# Patient Record
Sex: FEMALE | Race: WHITE | NOT HISPANIC OR LATINO | Employment: FULL TIME | URBAN - METROPOLITAN AREA
[De-identification: names, ages, dates, MRNs, and addresses within clinical notes are randomized per-mention and may not be internally consistent; named-entity substitution may affect disease eponyms.]

---

## 2017-01-09 ENCOUNTER — ALLSCRIPTS OFFICE VISIT (OUTPATIENT)
Dept: OTHER | Facility: OTHER | Age: 62
End: 2017-01-09

## 2017-02-13 ENCOUNTER — ALLSCRIPTS OFFICE VISIT (OUTPATIENT)
Dept: OTHER | Facility: OTHER | Age: 62
End: 2017-02-13

## 2017-02-13 DIAGNOSIS — E03.9 HYPOTHYROIDISM: ICD-10-CM

## 2017-02-13 DIAGNOSIS — E78.00 PURE HYPERCHOLESTEROLEMIA: ICD-10-CM

## 2017-02-13 DIAGNOSIS — E55.9 VITAMIN D DEFICIENCY: ICD-10-CM

## 2017-05-04 ENCOUNTER — TRANSCRIBE ORDERS (OUTPATIENT)
Dept: LAB | Facility: CLINIC | Age: 62
End: 2017-05-04

## 2017-05-04 ENCOUNTER — APPOINTMENT (OUTPATIENT)
Dept: LAB | Facility: CLINIC | Age: 62
End: 2017-05-04
Payer: COMMERCIAL

## 2017-05-04 DIAGNOSIS — E55.9 VITAMIN D DEFICIENCY: ICD-10-CM

## 2017-05-04 DIAGNOSIS — E78.00 PURE HYPERCHOLESTEROLEMIA: ICD-10-CM

## 2017-05-04 DIAGNOSIS — E03.9 HYPOTHYROIDISM: ICD-10-CM

## 2017-05-04 LAB
25(OH)D3 SERPL-MCNC: 26.4 NG/ML (ref 30–100)
ALBUMIN SERPL BCP-MCNC: 3.9 G/DL (ref 3.5–5)
ALP SERPL-CCNC: 58 U/L (ref 46–116)
ALT SERPL W P-5'-P-CCNC: 25 U/L (ref 12–78)
ANION GAP SERPL CALCULATED.3IONS-SCNC: 6 MMOL/L (ref 4–13)
AST SERPL W P-5'-P-CCNC: 13 U/L (ref 5–45)
BILIRUB SERPL-MCNC: 0.66 MG/DL (ref 0.2–1)
BUN SERPL-MCNC: 9 MG/DL (ref 5–25)
CALCIUM SERPL-MCNC: 8.8 MG/DL (ref 8.3–10.1)
CHLORIDE SERPL-SCNC: 101 MMOL/L (ref 100–108)
CHOLEST SERPL-MCNC: 74 MG/DL (ref 50–200)
CO2 SERPL-SCNC: 30 MMOL/L (ref 21–32)
CREAT SERPL-MCNC: 0.51 MG/DL (ref 0.6–1.3)
GFR SERPL CREATININE-BSD FRML MDRD: >60 ML/MIN/1.73SQ M
GLUCOSE P FAST SERPL-MCNC: 98 MG/DL (ref 65–99)
HDLC SERPL-MCNC: 58 MG/DL (ref 40–60)
LDLC SERPL CALC-MCNC: <0 MG/DL (ref 0–100)
POTASSIUM SERPL-SCNC: 4.2 MMOL/L (ref 3.5–5.3)
PROT SERPL-MCNC: 7 G/DL (ref 6.4–8.2)
SODIUM SERPL-SCNC: 137 MMOL/L (ref 136–145)
T4 FREE SERPL-MCNC: 0.92 NG/DL (ref 0.76–1.46)
TRIGL SERPL-MCNC: 211 MG/DL
TSH SERPL DL<=0.05 MIU/L-ACNC: 4.06 UIU/ML (ref 0.36–3.74)

## 2017-05-04 PROCEDURE — 82306 VITAMIN D 25 HYDROXY: CPT

## 2017-05-04 PROCEDURE — 84443 ASSAY THYROID STIM HORMONE: CPT

## 2017-05-04 PROCEDURE — 84439 ASSAY OF FREE THYROXINE: CPT

## 2017-05-04 PROCEDURE — 80061 LIPID PANEL: CPT

## 2017-05-04 PROCEDURE — 80053 COMPREHEN METABOLIC PANEL: CPT

## 2017-05-04 PROCEDURE — 36415 COLL VENOUS BLD VENIPUNCTURE: CPT

## 2017-05-10 ENCOUNTER — GENERIC CONVERSION - ENCOUNTER (OUTPATIENT)
Dept: OTHER | Facility: OTHER | Age: 62
End: 2017-05-10

## 2017-05-12 ENCOUNTER — ALLSCRIPTS OFFICE VISIT (OUTPATIENT)
Dept: OTHER | Facility: OTHER | Age: 62
End: 2017-05-12

## 2017-05-12 DIAGNOSIS — M54.12 RADICULOPATHY OF CERVICAL REGION: ICD-10-CM

## 2017-05-12 DIAGNOSIS — R09.89 OTHER SPECIFIED SYMPTOMS AND SIGNS INVOLVING THE CIRCULATORY AND RESPIRATORY SYSTEMS: ICD-10-CM

## 2017-05-12 DIAGNOSIS — R29.898 OTHER SYMPTOMS AND SIGNS INVOLVING THE MUSCULOSKELETAL SYSTEM: ICD-10-CM

## 2017-05-12 DIAGNOSIS — E03.9 HYPOTHYROIDISM: ICD-10-CM

## 2017-05-12 DIAGNOSIS — Z12.31 ENCOUNTER FOR SCREENING MAMMOGRAM FOR MALIGNANT NEOPLASM OF BREAST: ICD-10-CM

## 2017-05-12 DIAGNOSIS — R42 DIZZINESS AND GIDDINESS: ICD-10-CM

## 2017-08-16 ENCOUNTER — TRANSCRIBE ORDERS (OUTPATIENT)
Dept: LAB | Facility: CLINIC | Age: 62
End: 2017-08-16

## 2017-08-16 ENCOUNTER — APPOINTMENT (OUTPATIENT)
Dept: LAB | Facility: CLINIC | Age: 62
End: 2017-08-16
Payer: COMMERCIAL

## 2017-08-16 ENCOUNTER — GENERIC CONVERSION - ENCOUNTER (OUTPATIENT)
Dept: OTHER | Facility: OTHER | Age: 62
End: 2017-08-16

## 2017-08-16 DIAGNOSIS — E03.9 HYPOTHYROIDISM: ICD-10-CM

## 2017-08-16 DIAGNOSIS — E78.00 PURE HYPERCHOLESTEROLEMIA: ICD-10-CM

## 2017-08-16 DIAGNOSIS — M76.31 ILIOTIBIAL BAND SYNDROME OF RIGHT SIDE: ICD-10-CM

## 2017-08-16 LAB
ALBUMIN SERPL BCP-MCNC: 3.7 G/DL (ref 3.5–5)
ALP SERPL-CCNC: 60 U/L (ref 46–116)
ALT SERPL W P-5'-P-CCNC: 25 U/L (ref 12–78)
ANION GAP SERPL CALCULATED.3IONS-SCNC: 5 MMOL/L (ref 4–13)
AST SERPL W P-5'-P-CCNC: 17 U/L (ref 5–45)
BILIRUB SERPL-MCNC: 0.62 MG/DL (ref 0.2–1)
BUN SERPL-MCNC: 11 MG/DL (ref 5–25)
CALCIUM SERPL-MCNC: 8.9 MG/DL (ref 8.3–10.1)
CHLORIDE SERPL-SCNC: 104 MMOL/L (ref 100–108)
CHOLEST SERPL-MCNC: 239 MG/DL (ref 50–200)
CO2 SERPL-SCNC: 30 MMOL/L (ref 21–32)
CREAT SERPL-MCNC: 0.51 MG/DL (ref 0.6–1.3)
GFR SERPL CREATININE-BSD FRML MDRD: 106 ML/MIN/1.73SQ M
GLUCOSE P FAST SERPL-MCNC: 92 MG/DL (ref 65–99)
HDLC SERPL-MCNC: 57 MG/DL (ref 40–60)
LDLC SERPL CALC-MCNC: 139 MG/DL (ref 0–100)
POTASSIUM SERPL-SCNC: 4.1 MMOL/L (ref 3.5–5.3)
PROT SERPL-MCNC: 6.9 G/DL (ref 6.4–8.2)
SODIUM SERPL-SCNC: 139 MMOL/L (ref 136–145)
T4 FREE SERPL-MCNC: 0.96 NG/DL (ref 0.76–1.46)
TRIGL SERPL-MCNC: 214 MG/DL
TSH SERPL DL<=0.05 MIU/L-ACNC: 4.11 UIU/ML (ref 0.36–3.74)

## 2017-08-16 PROCEDURE — 36415 COLL VENOUS BLD VENIPUNCTURE: CPT

## 2017-08-16 PROCEDURE — 84443 ASSAY THYROID STIM HORMONE: CPT

## 2017-08-16 PROCEDURE — 84439 ASSAY OF FREE THYROXINE: CPT

## 2017-08-16 PROCEDURE — 80053 COMPREHEN METABOLIC PANEL: CPT

## 2017-08-16 PROCEDURE — 80061 LIPID PANEL: CPT

## 2017-08-18 ENCOUNTER — ALLSCRIPTS OFFICE VISIT (OUTPATIENT)
Dept: OTHER | Facility: OTHER | Age: 62
End: 2017-08-18

## 2017-08-24 ENCOUNTER — HOSPITAL ENCOUNTER (OUTPATIENT)
Dept: MAMMOGRAPHY | Facility: HOSPITAL | Age: 62
Discharge: HOME | End: 2017-08-24
Payer: COMMERCIAL

## 2017-08-24 ENCOUNTER — GENERIC CONVERSION - ENCOUNTER (OUTPATIENT)
Dept: OTHER | Facility: OTHER | Age: 62
End: 2017-08-24

## 2017-08-24 DIAGNOSIS — Z12.31 ENCOUNTER FOR SCREENING MAMMOGRAM FOR MALIGNANT NEOPLASM OF BREAST: ICD-10-CM

## 2017-08-24 PROCEDURE — G0202 SCR MAMMO BI INCL CAD: HCPCS

## 2017-11-02 ENCOUNTER — ALLSCRIPTS OFFICE VISIT (OUTPATIENT)
Dept: OTHER | Facility: OTHER | Age: 62
End: 2017-11-02

## 2017-12-18 DIAGNOSIS — E03.9 HYPOTHYROIDISM: ICD-10-CM

## 2018-01-10 NOTE — RESULT NOTES
Verified Results  * XR SPINE CERVICAL 2 OR 3 VIEW 51Ush0763 10:12AM Tressa Moreland    Order Number: [id number]     Test Name Result Flag Reference   XR SPINE CERVICAL 2 OR 3 VW (Report)     CERVICAL SPINE     INDICATION: Chronic neck pain     COMPARISON: None     VIEWS: AP, lateral and open mouth projections; 5 images     FINDINGS:     No evidence of fracture or subluxation  There is straightening of the normal cervical lordosis  Mild degenerative changes with small anterior osteophytes at C4-5  The prevertebral soft tissues are within normal limits  The lung apices are intact  IMPRESSION:     Mild degenerative changes without acute osseous abnormality  There is straightening of the normal cervical lordosis which can be seen in muscle spasm         Workstation performed: [id number]     Signed by:   Tamia Sandoval MD   7/3/16

## 2018-01-10 NOTE — RESULT NOTES
Verified Results  (1) COMPREHENSIVE METABOLIC PANEL [id number] 38:16OS [facility] Order Number: [id number]     Test Name Result Flag Reference   SODIUM 137 mmol/L  136-145   POTASSIUM 4 2 mmol/L  3 5-5 3   CHLORIDE 101 mmol/L  100-108   CARBON DIOXIDE 30 mmol/L  21-32   ANION GAP (CALC) 6 mmol/L  4-13   BLOOD UREA NITROGEN 9 mg/dL  5-25   CREATININE 0 51 mg/dL L 0 60-1 30   Standardized to IDMS reference method   CALCIUM 8 8 mg/dL  8 3-10 1   BILI, TOTAL 0 66 mg/dL  0 20-1 00   ALK PHOSPHATAS 58 U/L     ALT (SGPT) 25 U/L  12-78   AST(SGOT) 13 U/L  5-45   ALBUMIN 3 9 g/dL  3 5-5 0   TOTAL PROTEIN 7 0 g/dL  6 4-8 2   eGFR Non-African American      >60 0 ml/min/1 73sq [facility] Disease Education Program recommendations are as follows:  GFR calculation is accurate only with a steady state creatinine  Chronic Kidney disease less than 60 ml/min/1 73 sq  meters  Kidney failure less than 15 ml/min/1 73 sq  meters  GLUCOSE FASTING 98 mg/dL  65-99     (1) LIPID PANEL FASTING W DIRECT LDL REFLEX 67GSL7122 10:03AM [facility]cio [facility] Order Number: [id number]     Test Name Result Flag Reference   CHOLESTEROL 74 mg/dL     LDL CHOLESTEROL CALCULATED <0 mg/dL L 0-100   Unable to calculate   - Patient Instructions: This is a fasting blood test  Water, black tea or black coffee only after 9:00pm the night before test   Drink 2 glasses of water the morning of test       Triglyceride:         Normal              <150 mg/dl       Borderline High    150-199 mg/dl       High               200-499 mg/dl       Very High          >499 mg/dl  Cholesterol:         Desirable        <200 mg/dl      Borderline High  200-239 mg/dl      High             >239 mg/dl  HDL Cholesterol:        High    >59 mg/dL      Low     <41 mg/dL  LDL CALCULATED:    This screening LDL is a calculated result    It does not have the accuracy of the Direct Measured LDL in the monitoring of patients with hyperlipidemia and/or statin therapy  Direct Measure LDL (GZG863) must be ordered separately in these patients  TRIGLYCERIDES 211 mg/dL H <=150   Specimen collection should occur prior to N-Acetylcysteine or Metamizole administration due to the potential for falsely depressed results  HDL,DIRECT 58 mg/dL  40-60   Specimen collection should occur prior to Metamizole administration due to the potential for falsely depressed results  (1) TSH WITH FT4 REFLEX [id number] 10:03AM [employer] Order Number: [id number]     Test Name Result Flag Reference   TSH 4 060 uIU/mL H 0 358-3 740   Patients undergoing fluorescein dye angiography may retain small amounts of fluorescein in the body for 48-72 hours post procedure  Samples containing fluorescein can produce falsely depressed TSH values  If the patient had this procedure,a specimen should be resubmitted post fluorescein clearance  The recommended reference ranges for TSH during pregnancy are as follows:  First trimester 0 1 to 2 5 uIU/mL  Second trimester  0 2 to 3 0 uIU/mL  Third trimester 0 3 to 3 0 uIU/m   T4,FREE 0 92 ng/dL  0 76-1 46     (1) VITAMIN D 25-HYDROXY 48TLW9189 10:03AM [employer] Order Number: [id number]     Test Name Result Flag Reference   VIT D 25-HYDROX 26 4 ng/mL L 30 0-100 0   This assay is a certified procedure of the CDC Vitamin D Standardization Certification Program (VDSCP)     Deficiency <20ng/ml   Insufficiency 20-30ng/ml   Sufficient  ng/ml     *Patients undergoing fluorescein dye angiography may retain small amounts of fluorescein in the body for 48-72 hours post procedure  Samples containing fluorescein can produce falsely elevated Vitamin D values  If the patient had this procedure, a specimen should be resubmitted post fluorescein clearance

## 2018-01-11 NOTE — RESULT NOTES
Verified Results  * MAMMO SCREENING BILATERAL W CAD 37Hzc9748 08:46AM [address], [address] Order Number: [id number]    - Patient Instructions: To schedule this appointment, please contact Central Scheduling at [phone]  Do not wear any perfume, powder, lotion or deodorant on breast or underarm area  Please bring your doctors order, referral (if needed) and insurance information with you on the day of the test  Failure to bring this information may result in this test being rescheduled  Arrive 15 minutes prior to your appointment time to register  On the day of your test, please bring any prior mammogram or breast studies with you that were not performed at a [facility]  Failure to bring prior exams may result in your test needing to be rescheduled  Test Name Result Flag Reference   MAMMO SCREENING BILATERAL W CAD (Report)     Patient History:   Patient is postmenopausal    Family history of unknown cancer at age 72 in father, breast    cancer in maternal aunt  No Hormone Replacement Therapy   Patient has never smoked  Patient's BMI is 23 9  Reason for exam: screening, asymptomatic  Mammo Screening Bilateral W CAD: August 24, 2017 - Check In #:    [de-identified]   Bilateral CC and MLO view(s) were taken  Technologist: RASHAD Wright (RASHAD)(M)   Prior study comparison: December 30, 2015, bilateral AMA dig scrn   mamm w/CAD performed at [address] [place] [address]  August 19, 2013, bilateral digital screening mammogram,    performed at [address]  The breast tissue is heterogeneously dense, potentially limiting    the sensitivity of mammography  Patient risk, included in this    report, assists in determining the appropriate screening regimen    (such as 3-D mammography or the inclusion of automated breast    ultrasound or MRI)  3-D mammography may also remain indicated as    screening   No new dominant soft tissue mass, architectural distortion or suspicious calcifications are noted  The skin and    nipple contours are within normal limits  No evidence of malignancy  No significant changes   when compared with prior studies  ACR BI-RADSï¾® Assessments: BiRad:1 - Negative     Recommendation:   Routine screening mammogram of both breasts in 1 year  Analyzed by CAD     The patient is scheduled in a reminder system  8-10% of cancers will be missed on mammography  Management of a    palpable abnormality must be based on clinical grounds  Patients   will be notified of their results via letter from our facility  Accredited by [employer] of Radiology and FDA  Transcription Location: [address] [place] [address]: [id number]     Risk Value(s):   Tyrer-Cuzick 10 Year: 4 700%, Tyrer-Cuzick Lifetime: 11 900%,    Myriad Table: 1 5%, PATRICK 5 Year: 1 9%, NCI Lifetime: 10 2%   Signed by:   Lala Guo MD   8/24/17       Plan  Health Maintenance    · * MAMMO SCREENING BILATERAL W CAD ; every 1 year;  Last 24Aug2017;  Status:Active

## 2018-01-12 VITALS
HEIGHT: 62 IN | RESPIRATION RATE: 16 BRPM | SYSTOLIC BLOOD PRESSURE: 122 MMHG | BODY MASS INDEX: 26.87 KG/M2 | WEIGHT: 146 LBS | DIASTOLIC BLOOD PRESSURE: 80 MMHG | HEART RATE: 80 BPM

## 2018-01-14 VITALS
DIASTOLIC BLOOD PRESSURE: 70 MMHG | RESPIRATION RATE: 16 BRPM | WEIGHT: 144 LBS | HEART RATE: 60 BPM | BODY MASS INDEX: 26.5 KG/M2 | SYSTOLIC BLOOD PRESSURE: 110 MMHG | HEIGHT: 62 IN

## 2018-01-14 VITALS
TEMPERATURE: 97.6 F | BODY MASS INDEX: 26.75 KG/M2 | HEART RATE: 78 BPM | SYSTOLIC BLOOD PRESSURE: 117 MMHG | OXYGEN SATURATION: 97 % | WEIGHT: 146.25 LBS | DIASTOLIC BLOOD PRESSURE: 70 MMHG

## 2018-01-16 NOTE — PROGRESS NOTES
Assessment    1  Former smoker (V15 82) (X60 361)   2  Well adult on routine health check (V70 0) (Z00 00)   3  Hypercholesterolemia (272 0) (E78 00)   · High HDL   4  Hypothyroidism (244 9) (E03 9)   5  Chronic constipation (564 00) (K59 09)   6  Lightheadedness (780 4) (R42)   7  Left carotid bruit (785 9) (R09 89)   8  Cervical radicular pain (723 4) (M54 12)   9  Bilateral arm weakness (729 89) (R29 898)   10  Headache syndrome (339 89) (G44 89)   11  Dysphagia (787 20) (R13 10)    Plan  Bilateral arm weakness, Cervical radicular pain    · * MRI CERVICAL SPINE WO CONTRAST; Status:Need Information - Financial  Authorization; Requested for:02Nvk8816;    · *1 - [facility]  *  Status: Active  Requested for: 05JUE9404  Care Summary provided  : Yes  Dysphagia    · 1 - Franklyn Dillon MD, Dianelys Rosenbaum (Gastroenterology) Co-Management  *  Status: Active  Requested  for: 97FCO6052  Care Summary provided  : Yes  Encounter for mammogram to establish baseline mammogram    · * MAMMO SCREENING BILATERAL W CAD; Status:Active - Retrospective By Protocol  Authorization; Requested for:62Ppz4095;   Hypercholesterolemia    · (1) COMPREHENSIVE METABOLIC PANEL; Status:Active; Requested for:40Etj7892;    · (1) LIPID PANEL, FASTING; Status:Active; Requested for:15Phy7838;   Hypothyroidism    · Levothyroxine Sodium 75 MCG Oral Tablet; TAKE 1 TABLET DAILY   · (1) TSH WITH FT4 REFLEX; Status:Active; Requested for:49Qyx4056;   Left carotid bruit, Lightheadedness    · VAS CAROTID COMPLETE STUDY; SIDE:Bilateral; Status:Hold For - Scheduling;  Requested for:12May2017;     Discussion/Summary    Patient presents today for annual checkup  She got a few things going on  She's having pretty atypical symptoms several hours after exercise of "head rushes"   This seems like a headache syndrome and I would like her to try taking 1-2 Aleve prior to exercise and hydrate significantly 4, during and after exercise which I hope will resolve this problem  If not, I'm going to have her see neurology  She is having some persistent neck pain with a sensation of bilateral arm weakness  Her exam was relatively normal today, but I'm going to have her get a cervical spine MRI  She's having some intermittent dysphagia and I'm going to have her get an EGD and she was referred to GI for this  She also has been having some reflux which is new for her which should be evaluated by GI  In the interim, she may try taking Zantac 150 twice daily as needed  She has a history of hypothyroidism and TSH is a bit elevated  In light of her multiple symptoms including some fatigue, I'm going to bump up her levothyroxine to 75 Âµg  Repeat TSH within 3 months  Her lipids were very atypical with an LDL less than zero  This has not been the case in the past and I'm going to have her get repeat fasting blood work prior to follow-up in about 3 months  She does have some chronic degenerative changes of her neck  I am going to give her a slip for physical therapy and I sent her for an MRI of her C-spine  I am going to get a carotid duplex that she has a very slight left carotid bruit and she is also having symptoms that could be concerning for vertebral basilar insufficiency  She has some chronic constipation which is currently stable  She needs a mammogram and she will be given a prescription for that today  She is up-to-date on colonoscopy, but we may decide to do that a bit early with her upcoming EGD and chronic constipation  She has a history of hypothyroidism and TSH has been a bit elevated  Liver      Chief Complaint  Physical      History of Present Illness  HM, Adult Female: The patient is being seen for a health maintenance evaluation  General Health: The patient's health since the last visit is described as fair  She has regular dental visits  Immunizations status: up to date  Lifestyle:  She does not have a healthy diet  She has weight concerns   She exercises regularly  She does not use tobacco    Screening:   HPI: Patient presents today for an annual physical  She has multiple concerns  She continues to have a sensation of head "pressure" which occurs pretty specifically after she exercises in the morning and then sits down at work for several hours and stands  She will get a sensation of pressure diffusely throughout her skull  She denies severe headache, nausea or vomiting associated with this  She's had no vision changes  She did have an open-air MRI done which apparently was normal, but I do not have the results available today  She has no prior history of migraine headaches  There is no family history of migraines  She is having some chronic neck issues  She has some straightening of her lordosis and some degenerative changes on x-rays done a few months ago  She has been under chiropractic care with some relief but nothing that persists  She gets occasional pain into her posterior skull from her neck  She has limited range of motion of her neck chronically and feels as though her arms are becoming weaker  She also has been having some difficulty swallowing intermittently over the past year or so  She does not think food is ever gotten completely stuck, but has noted some food is slow to pass  She denies vomiting from this  She has had some intermittent reflux over the past year so as well, where she has not typically had problems with reflux in the past  She does complain of some fatigue  She has a history of hypothyroidism and TSH was mildly elevated  She has some chronic constipation  She notes she doesn't really have constipation and trouble moving her bowels  She didn't really goes at least every other day  She has allergies and continues on Flonase with good results  Review of Systems    Constitutional: no fever, not feeling poorly, no recent weight gain, no chills, not feeling tired and no recent weight loss  Eyes: no eyesight problems     ENT: no nasal discharge  Cardiovascular: no chest pain, no palpitations and no lower extremity edema  Respiratory: no shortness of breath, no cough, no wheezing and no shortness of breath during exertion  Gastrointestinal: constipation, but no abdominal pain, no nausea and no diarrhea  Genitourinary: no dysuria  Musculoskeletal: no myalgias and no joint stiffness  Integumentary: no rashes  Neurological: dizziness, but no headache, no numbness, no tingling and no fainting  Psychiatric: no anxiety, no sleep disturbances and no depression  Hematologic/Lymphatic: no tendency for easy bleeding and no tendency for easy bruising  Active Problems    1  Chronic back pain (724 5,338 29) (M54 9,G89 29)   2  Chronic constipation (564 00) (K59 09)   3  Encounter for screening colonoscopy (V76 51) (Z12 11)   4  Ganglion cyst (727 43) (M67 40)   5  Hypercholesterolemia (272 0) (E78 00)   6  Hypothyroidism (244 9) (E03 9)   7  Need for influenza vaccination (V04 81) (Z23)   8  Vitamin D deficiency (268 9) (E55 9)   9  Well adult on routine health check (V70 0) (Z00 00)    Past Medical History    · History of Acute UTI (599 0) (N39 0)   · History of Anxiety (300 00) (F41 9)   · History of Hip pain, right (719 45) (M25 551)   · History of chest pain (V13 89) (L69 726)   · History of fatigue (V13 89) (W73 932)   · History of hematuria (V13 09) (Z87 448)   · History of Muscle spasms of neck (728 85) (G67 216)   · History of Myalgia And Myositis (729 1)   · History of Neck pain (723 1) (M54 2)   · History of Pain, upper back (724 5) (M54 9)   · Polydipsia (783 5) (R63 1)   · History of Previous Spontaneous Vaginal Delivery   · History of Skin neoplasm (239 2) (D49 2)   · History of Sore throat (462) (J02 9)   · History of UTI (urinary tract infection), bacterial (599 0,041 9) (N39 0,A49  9)   · History of UTI symptoms (788 99) (R39 9)    Surgical History    · History of Shoulder Surgery   · History of Shoulder Surgery    Family History  Mother    · Family history of Pure Hypercholesterolemia  Father    · Family history of Cancer   · Family history of Large Cell Neuroendocrine Carcinoma   · Family history of Stroke Syndrome (V17 1)    Social History    · Being A Social Drinker   · Former smoker (G40 57) (E06 622)   · Marital History -  (V61 03)    Current Meds   1  Fluticasone Propionate 50 MCG/ACT Nasal Suspension; use 2 sprays in each nostril   once daily; Therapy: 60KDL7353 to (Evaluate:01Nru8430)  Requested for: 99FNT2277; Last   Rx:13Jan2017 Ordered   2  Levothyroxine Sodium 50 MCG Oral Tablet; take 1 tablet every day; Therapy: 98Sau2062 to (Evaluate:75Vzq2937)  Requested for: 50XZQ8066; Last   Rx:27Gse8812 Ordered   3  Vitamin D 1000 UNIT CAPS; TAKE 2 CAPSULE DAILY; Therapy: 80Plq4367 to Recorded    Allergies    1  Sulfa Drugs    Vitals   Recorded: 31JKW2555 10:08AM Recorded: 03LRU0202 09:30AM   Heart Rate   68   Respiration   16   Systolic 179 466, Sitting 625   Diastolic 72 72, Sitting 80   Height   5 ft 2 in   Weight   146 lb    BMI Calculated   26 7   BSA Calculated   1 67     Physical Exam    Constitutional   General appearance: No acute distress, well appearing and well nourished  Head and Face   Head and face: Normal     Palpation of the face and sinuses: No sinus tenderness  Eyes   Conjunctiva and lids: No swelling, erythema or discharge  Ophthalmoscopic examination: Normal fundi and optic discs  Ears, Nose, Mouth, and Throat   External inspection of ears and nose: Normal     Otoscopic examination: Tympanic membranes translucent with normal light reflex  Canals patent without erythema  Hearing: Normal     Nasal mucosa, septum, and turbinates: Normal without edema or erythema  Lips, teeth, and gums: Normal, good dentition  Oropharynx: Normal with no erythema, edema, exudate or lesions  Neck   Neck: Supple, symmetric, trachea midline, no masses      Thyroid: Normal, no thyromegaly  Pulmonary   Respiratory effort: No increased work of breathing or signs of respiratory distress  Auscultation of lungs: Clear to auscultation  Cardiovascular   Auscultation of heart: Normal rate and rhythm, normal S1 and S2, no murmurs  Carotid pulses: 2+ bilaterally  Abdominal aorta: Normal     Abdomen   Abdomen: Non-tender, no masses  Liver and spleen: No hepatomegaly or splenomegaly  Lymphatic   Palpation of lymph nodes in neck: No lymphadenopathy  Musculoskeletal   Gait and station: Normal     Skin   Skin and subcutaneous tissue: Normal without rashes or lesions  Neurologic   Cranial nerves: Cranial nerves II-XII intact  Cortical function: Normal mental status  Psychiatric   Judgment and insight: Normal     Orientation to person, place, and time: Normal     Recent and remote memory: Intact  Mood and affect: Normal        Future Appointments    Date/Time Provider Specialty Site   08/18/2017 09:15 SHERIDAN Sánchez   Family Medicine [facility] 876     Signatures   Electronically signed by : SHERIDAN Rosa ; May 12 2017  1:28PM EST                       (Author)

## 2018-01-18 NOTE — RESULT NOTES
Verified Results  (1) TSH WITH FT4 REFLEX [id number] 08:25AM Nathaly Anglin Order Number: [id number]     Test Name Result Flag Reference   TSH 4 110 uIU/mL H 0 358-3 740   Patients undergoing fluorescein dye angiography may retain small amounts of fluorescein in the body for 48-72 hours post procedure  Samples containing fluorescein can produce falsely depressed TSH values  If the patient had this procedure,a specimen should be resubmitted post fluorescein clearance  The recommended reference ranges for TSH during pregnancy are as follows:  First trimester 0 1 to 2 5 uIU/mL  Second trimester  0 2 to 3 0 uIU/mL  Third trimester 0 3 to 3 0 uIU/m   T4,FREE 0 96 ng/dL  0 76-1 46   Specimen collection should occur prior to Sulfasalazine administration due to the potential for falsely elevated results  (1) LIPID PANEL, FASTING 91Bhg9273 08:25AM [facility] Order Number: [id number]     Test Name Result Flag Reference   CHOLESTEROL 239 mg/dL H    HDL,DIRECT 57 mg/dL  40-60   Specimen collection should occur prior to Metamizole administration due to the potential for falsley depressed results  LDL CHOLESTEROL CALCULATED 139 mg/dL H 0-100   Triglyceride:        Normal ??? ??? ??? ??? ??? ??? ??? <150 mg/dl   ??? ??? ???Borderline High ??? ??? 150-199 mg/dl   ??? ??? ? ?? High ??? ??? ??? ??? ??? ??? ??? 200-499 mg/dl   ??? ??? ? ??Very High ??? ??? ??? ??? ??? >499 mg/dl      Cholesterol:       Desirable ??? ??? ??? ??? <200 mg/dl   ??? ??? Borderline High ??? 200-239 mg/dl   ??? ??? High ??? ??? ??? ??? ??? ??? >239 mg/dl      HDL Cholesterol:       High ??? ???>59 mg/dL   ??? ??? Low ??? ??? <41 mg/dL      This screening LDL is a calculated result  It does not have the accuracy of the Direct Measured LDL in the monitoring of patients with hyperlipidemia and/or statin therapy  Direct Measure LDL (GAR105) must be ordered separately in these patients  TRIGLYCERIDES 214 mg/dL H <=150   Specimen collection should occur prior to N-Acetylcysteine or Metamizole administration due to the potential for falsely depressed results  (1) COMPREHENSIVE METABOLIC PANEL 33DTE1320 12:87LL Nathaly Anglin Order Number: [id number]     Test Name Result Flag Reference   SODIUM 139 mmol/L  136-145   POTASSIUM 4 1 mmol/L  3 5-5 3   CHLORIDE 104 mmol/L  100-108   CARBON DIOXIDE 30 mmol/L  21-32   ANION GAP (CALC) 5 mmol/L  4-13   BLOOD UREA NITROGEN 11 mg/dL  5-25   CREATININE 0 51 mg/dL L 0 60-1 30   Standardized to IDMS reference method   CALCIUM 8 9 mg/dL  8 3-10 1   BILI, TOTAL 0 62 mg/dL  0 20-1 00   ALK PHOSPHATAS 60 U/L     ALT (SGPT) 25 U/L  12-78   Specimen collection should occur prior to Sulfasalazine and/or Sulfapyridine administration due to the potential for falsely depressed results  AST(SGOT) 17 U/L  5-45   Specimen collection should occur prior to Sulfasalazine administration due to the potential for falsely depressed results  ALBUMIN 3 7 g/dL  3 5-5 0   TOTAL PROTEIN 6 9 g/dL  6 4-8 2   eGFR 106 ml/min/1 73sq [facility] Disease Education Program recommendations are as follows:  GFR calculation is accurate only with a steady state creatinine  Chronic Kidney disease less than 60 ml/min/1 73 sq  meters  Kidney failure less than 15 ml/min/1 73 sq  meters  GLUCOSE FASTING 92 mg/dL  65-99   Specimen collection should occur prior to Sulfasalazine administration due to the potential for falsely depressed results  Specimen collection should occur prior to Sulfapyridine administration due to the potential for falsely elevated results

## 2018-01-22 VITALS
SYSTOLIC BLOOD PRESSURE: 120 MMHG | HEART RATE: 68 BPM | BODY MASS INDEX: 26.87 KG/M2 | DIASTOLIC BLOOD PRESSURE: 72 MMHG | HEIGHT: 62 IN | WEIGHT: 146 LBS | RESPIRATION RATE: 16 BRPM

## 2018-03-07 NOTE — PROGRESS NOTES
History of Present Illness    Revaccination   Vaccine Information: Vaccine(s) Given (names): Adacel 16  Spoke with patient regarding vaccine out of temperature range and risks and benefits of revaccination  Action(s): Revaccination declined  Pt called (attempt 1): [id number] [id number]  Other Information: discuss with DMD at o v  today- revaccination is declined  No Show Appt(s): [id number]  Revaccination Completed: 2017  Active Problems     1  Chronic back pain (724 5,338 29) (M54 9,G89 29)   2  Encounter for screening colonoscopy (V76 51) (Z12 11)   3  Need for influenza vaccination (V04 81) (Z23)    Well adult on routine health check (V70 0) (Z00 00)       Chronic constipation (564 00) (K59 00)          Immunizations  Influenza --- Ezequiel Escobar: [id number] Blakes: 22[birth year]Sep-2016   Tdap --- Series1: 2016     Current Meds   1  Cyclobenzaprine HCl - 10 MG Oral Tablet; TAKE 1 TABLET AT BEDTIME AS NEEDED   2  Levothyroxine Sodium 50 MCG Oral Tablet; take 1 tablet every day   3  Nitrofurantoin Macrocrystal 100 MG Oral Capsule; Take 1 po bid x 7 days   4  Vitamin D 1000 UNIT CAPS; TAKE 2 CAPSULE DAILY    Allergies    1  Sulfa Drugs    Plan    1  RVAC-Adacel 5-2-15 5 LF-MCG/0 5 Intramuscular Suspension    Future Appointments    Date/Time Provider Specialty Site   2017 09:15 SHERIDAN Wolfe   [facility] 876     Signatures   Electronically signed by : SHERIDAN Nelson ; May 12 2017  1:43PM EST

## 2018-03-13 ENCOUNTER — TELEPHONE (OUTPATIENT)
Dept: FAMILY MEDICINE CLINIC | Facility: CLINIC | Age: 63
End: 2018-03-13

## 2018-03-13 DIAGNOSIS — M76.31 IT BAND SYNDROME, RIGHT: Primary | ICD-10-CM

## 2018-03-13 NOTE — TELEPHONE ENCOUNTER
Pt called today stating that we had ordered PT for her several months ago and she had not gone but now she would like to go  Pt wants to go to [facility] but they will not accept the script that she has  Pt would like to know if we can give her a new script for this facility  Ok at order?

## 2018-05-06 DIAGNOSIS — E03.9 ACQUIRED HYPOTHYROIDISM: Primary | ICD-10-CM

## 2018-05-06 RX ORDER — LEVOTHYROXINE SODIUM 0.07 MG/1
TABLET ORAL
Qty: 30 TABLET | Refills: 5 | Status: SHIPPED | OUTPATIENT
Start: 2018-05-06 | End: 2018-11-06 | Stop reason: SDUPTHER

## 2018-05-29 ENCOUNTER — OFFICE VISIT (OUTPATIENT)
Dept: FAMILY MEDICINE CLINIC | Facility: CLINIC | Age: 63
End: 2018-05-29
Payer: COMMERCIAL

## 2018-05-29 VITALS
SYSTOLIC BLOOD PRESSURE: 128 MMHG | DIASTOLIC BLOOD PRESSURE: 76 MMHG | HEIGHT: 62 IN | RESPIRATION RATE: 16 BRPM | HEART RATE: 76 BPM | TEMPERATURE: 99.6 F | WEIGHT: 148 LBS | BODY MASS INDEX: 27.23 KG/M2

## 2018-05-29 DIAGNOSIS — E03.9 HYPOTHYROIDISM, UNSPECIFIED TYPE: Primary | ICD-10-CM

## 2018-05-29 DIAGNOSIS — G89.29 CHRONIC NECK PAIN: ICD-10-CM

## 2018-05-29 DIAGNOSIS — M79.671 RIGHT FOOT PAIN: ICD-10-CM

## 2018-05-29 DIAGNOSIS — M54.2 CHRONIC NECK PAIN: ICD-10-CM

## 2018-05-29 PROBLEM — R09.89 LEFT CAROTID BRUIT: Status: ACTIVE | Noted: 2017-05-12

## 2018-05-29 PROBLEM — G44.89 HEADACHE SYNDROME: Status: ACTIVE | Noted: 2017-05-12

## 2018-05-29 PROCEDURE — 99214 OFFICE O/P EST MOD 30 MIN: CPT | Performed by: FAMILY MEDICINE

## 2018-05-29 RX ORDER — BIOTIN 1 MG
2 TABLET ORAL DAILY
COMMUNITY
Start: 2015-09-11 | End: 2020-09-30

## 2018-05-29 RX ORDER — FLUTICASONE PROPIONATE 50 MCG
2 SPRAY, SUSPENSION (ML) NASAL DAILY
COMMUNITY
Start: 2016-12-16 | End: 2018-05-29

## 2018-05-29 NOTE — PROGRESS NOTES
Assessment/Plan:  Chronic neck pain  PT would agree to do neck pain if we make referral, patient already established, I agree she should have PT and would encourage her to be compliant with home and gym exercise programs     Diagnoses and all orders for this visit:    Hypothyroidism, unspecified type  Due for recheck of TSH w FT4, will order, for now continue with synthroid and will address dosage if indicated  Right foot pain  I suspect this might be a foreign body that is working its way out, most likely from her distant past   It could also be a cyst or a new infectious or inflammatory process that has not completely declared itself  For now I would like to get an x-ray of her foot for foreign body evaluation, I would also like for her to soak her foot about 3 times a day in warm water and she may also take NSAIDs like Aleve, Motrin, or Advil to help with the pain and discomfort  Icing might help with acute inflammation as well  I would make sure to wear supportive shoes and well-padded socks if needing to  Subjective:      Patient ID: Ted Calvillo is a 63 y o  female  62-year-old female who started this morning with a firm and tender nodule at the base of her heel in the fleshy part  It is painful with ambulation, she has no recollection of any trauma, she has some mild bruising  No systemic symptoms of fevers or chills, she does not recall any foreign bodies that could have been affecting this area  She works standing on her feet a lot in a [employer] room    Patient has a history of chronic neck pain, she has been following with physical therapy already and is requesting a referral  Patient has a history of hypothyroidism, at last visit had thyroid adjusted but has not had TSH Re checked, would be willing to go for labs        The following portions of the patient's history were reviewed and updated as appropriate: allergies, current medications, past family history, past medical history, past social history, past surgical history and problem list     Review of Systems   Constitutional: Positive for activity change  Negative for chills, fatigue, fever and unexpected weight change  HENT: Negative for hearing loss, postnasal drip and sore throat  Eyes: Negative for discharge and visual disturbance  Respiratory: Negative for shortness of breath  Cardiovascular: Negative for chest pain  Gastrointestinal: Negative for constipation, diarrhea, nausea and vomiting  Genitourinary: Negative for dysuria  No incontinence   Musculoskeletal: Positive for gait problem (Due to pain)  Negative for arthralgias and myalgias  Skin: Negative for rash  Small firm nodule in right heel  Mild bruising   Neurological: Negative for headaches  Hematological: Negative for adenopathy  Does not bruise/bleed easily  Psychiatric/Behavioral:        No anxiety or depression   All other systems reviewed and are negative  Objective:      /76   Pulse 76   Temp 99 6 °F (37 6 °C)   Resp 16   Ht 5' 2" (1 575 m)   Wt 67 1 kg (148 lb)   BMI 27 07 kg/m²          Physical Exam   Constitutional: She appears well-developed and well-nourished  No distress  HENT:   Head: Normocephalic and atraumatic  Mouth/Throat: Oropharynx is clear and moist    Eyes: Conjunctivae and EOM are normal  No scleral icterus  Neck: No thyromegaly present  Cardiovascular: Normal rate, regular rhythm and normal heart sounds  Exam reveals no gallop and no friction rub  No murmur heard  Pulmonary/Chest: Effort normal and breath sounds normal  No respiratory distress  Musculoskeletal:   Subcentimeter subcutaneous nodule with overlying bruising noted in the plantar aspect of the right heel   Skin: She is not diaphoretic

## 2018-05-29 NOTE — PATIENT INSTRUCTIONS
Assessment/Plan:  Chronic neck pain  PT would agree to do neck pain if we make referral, patient already established, I agree she should have PT and would encourage her to be compliant with home and gym exercise programs     Diagnoses and all orders for this visit:    Hypothyroidism, unspecified type  Due for recheck of TSH w FT4, will order, for now continue with synthroid and will address dosage if indicated  Right foot pain  I suspect this might be a foreign body that is working its way out, most likely from her distant past   It could also be a cyst or a new infectious or inflammatory process that has not completely declared itself  For now I would like to get an x-ray of her foot for foreign body evaluation, I would also like for her to soak her foot about 3 times a day in warm water and she may also take NSAIDs like Aleve, Motrin, or Advil to help with the pain and discomfort  Icing might help with acute inflammation as well  I would make sure to wear supportive shoes and well-padded socks if needing to

## 2018-05-29 NOTE — ASSESSMENT & PLAN NOTE
PT would agree to do neck pain if we make referral, patient already established, I agree she should have PT and would encourage her to be compliant with home and gym exercise programs

## 2018-06-01 ENCOUNTER — TRANSCRIBE ORDERS (OUTPATIENT)
Dept: ADMINISTRATIVE | Facility: HOSPITAL | Age: 63
End: 2018-06-01

## 2018-06-01 ENCOUNTER — HOSPITAL ENCOUNTER (OUTPATIENT)
Dept: RADIOLOGY | Facility: HOSPITAL | Age: 63
Discharge: HOME | End: 2018-06-01
Attending: FAMILY MEDICINE
Payer: COMMERCIAL

## 2018-06-01 DIAGNOSIS — M79.671 RIGHT FOOT PAIN: ICD-10-CM

## 2018-06-01 DIAGNOSIS — Z11.1 SCREENING EXAMINATION FOR PULMONARY TUBERCULOSIS: Primary | ICD-10-CM

## 2018-06-01 PROCEDURE — 73630 X-RAY EXAM OF FOOT: CPT

## 2018-06-08 ENCOUNTER — APPOINTMENT (OUTPATIENT)
Dept: LAB | Facility: CLINIC | Age: 63
End: 2018-06-08
Payer: COMMERCIAL

## 2018-06-08 DIAGNOSIS — E03.9 HYPOTHYROIDISM, UNSPECIFIED TYPE: ICD-10-CM

## 2018-06-08 LAB — TSH SERPL DL<=0.05 MIU/L-ACNC: 1.64 UIU/ML (ref 0.36–3.74)

## 2018-06-08 PROCEDURE — 84443 ASSAY THYROID STIM HORMONE: CPT

## 2018-06-08 PROCEDURE — 36415 COLL VENOUS BLD VENIPUNCTURE: CPT

## 2018-06-09 NOTE — PROGRESS NOTES
[facility]  FAM[facility] PRACT[facility] ACUTE OFFICE VISIT       NAME: Oscar Warner  AGE: 63 y o  SEX: female       : 1955        MRN: [id number]    DATE: 2018  TIME: 1:01 AM    Assessment and Plan     Problem List Items Addressed This Visit     Acute pain of both knees - Primary     The patient was encouraged to check knee x-rays as above and then also try PT for the knees  She can wear a supportive brace but was encouraged to not wear continuously  She was directed to start 2 Aleve twice daily with food if needed for relief  She should call if not improving with Pt over the next few weeks and we can refer her to Orthopedics  Relevant Orders    XR knee 3 vw left non injury    XR knee 3 vw right non injury    Ambulatory referral to Physical Therapy          Acute pain of both knees  The patient was encouraged to check knee x-rays as above and then also try PT for the knees  She can wear a supportive brace but was encouraged to not wear continuously  She was directed to start 2 Aleve twice daily with food if needed for relief  She should call if not improving with Pt over the next few weeks and we can refer her to Orthopedics  Chief Complaint     Chief Complaint   Patient presents with    Knee Pain     for 2 weeks        History of Present Illness   Oscar Warner is a 63y o -year-old female who presents for knee pain  Of note, she was seen by Dr Claudette Kand in our office about a week and half ago for right foot pain that was accompanied by a new nodule noted in the fleshy part of her heel  She was sent for an x-ray which did not find any foreign body  She reports that this pain has resolved but the nodule is still there  She notes that she started with left knee pain that was sharp and then now the right knee is helping  She notes that she was achy aa few years ago and has neck pain after started swimming   She stopped swimming so then has been stopping exercises in general  She notes that she feels like she has been tightening  She has been trying PT to stretch out over the last month  She was told that her knee cap was tight and was getting manipulation at the PT  She has pain up and down the steps and has crunchy sound too  She notes PT thinks that her knee cap is loose  She recommended avoiding steps and try ice  She has been improving but still getting sharp pains  She also notes that she has a bulging vein in the back of the left knee that is painless  She has been taking ibuprofen occasionally  She denies any trauma to the knees  Knee Pain    Pertinent negatives include no numbness  Review of Systems   Review of Systems   Musculoskeletal: Positive for arthralgias  Negative for joint swelling  Skin: Negative for rash  Neurological: Negative for numbness  Active Problem List     Patient Active Problem List   Diagnosis    Chronic constipation    Headache syndrome    Hypercholesterolemia    Hypothyroidism    Left carotid bruit    Vitamin D deficiency    Chronic neck pain    Acute pain of both knees    Dizziness    Headache    Hip pain    Trochanteric bursitis         Social History:  Social History     Social History    Marital status:      Spouse name: N/A    Number of children: N/A    Years of education: N/A     Occupational History    Not on file       Social History Main Topics    Smoking status: Never Smoker    Smokeless tobacco: Never Used      Comment: Former smoker per [employer]     Alcohol use Yes      Comment: occasionally / social     Drug use: No    Sexual activity: Not on file     Other Topics Concern    Not on file     Social History Narrative    No narrative on file       Objective     Vitals:    06/11/18 0956   BP: 120/70   Pulse: 62   Temp: 98 8 °F (37 1 °C)   SpO2: 97%     Wt Readings from Last 3 Encounters:   06/11/18 67 8 kg (149 lb 8 oz)   05/29/18 67 1 kg (148 lb)   08/18/17 65 3 kg (144 lb)       Physical Exam   Constitutional: She appears well-developed and well-nourished  No distress  Pulmonary/Chest: Effort normal    Musculoskeletal: Normal range of motion  She exhibits no edema or tenderness (over either knee)  No erythema/swelling/effusion noted of either knee, negative Blair's bilaterally (but mild crepitus noted during testing of left knee), negative anterior and posterior drawer bilaterally, negative medial and lateral distraction bilaterally, negative Apley compression and distraction bilaterally     Skin: Skin is warm and dry  No rash noted  Psychiatric: She has a normal mood and affect  Her behavior is normal    Vitals reviewed  Pertinent Laboratory/Diagnostic Studies:  Results for orders placed or performed in visit on 06/08/18   TSH, 3rd generation with T4 reflex   Result Value Ref Range    TSH 3RD GENERATON 1 640 0 358 - 3 740 uIU/mL       Orders Placed This Encounter   Procedures    XR knee 3 vw left non injury    XR knee 3 vw right non injury    Ambulatory referral to Physical Therapy       ALLERGIES:  Allergies   Allergen Reactions    Albuterol      Other reaction(s): SULFA (SULFONAMIDES) (Rash)    Sulfa Antibiotics Rash       Current Medications     Current Outpatient Prescriptions   Medication Sig Dispense Refill    Cholecalciferol (VITAMIN D3) 1000 units CAPS Take 2 capsules by mouth daily      levothyroxine 75 mcg tablet TAKE 1 TABLET DAILY  30 tablet 5     No current facility-administered medications for this visit            Carolyn Brower PA-C  6/12/2018 1:01 AM  [facility]

## 2018-06-11 ENCOUNTER — HOSPITAL ENCOUNTER (OUTPATIENT)
Dept: RADIOLOGY | Facility: HOSPITAL | Age: 63
Discharge: HOME | End: 2018-06-11
Payer: COMMERCIAL

## 2018-06-11 ENCOUNTER — OFFICE VISIT (OUTPATIENT)
Dept: FAMILY MEDICINE CLINIC | Facility: CLINIC | Age: 63
End: 2018-06-11
Payer: COMMERCIAL

## 2018-06-11 VITALS
SYSTOLIC BLOOD PRESSURE: 120 MMHG | BODY MASS INDEX: 27.51 KG/M2 | TEMPERATURE: 98.8 F | WEIGHT: 149.5 LBS | DIASTOLIC BLOOD PRESSURE: 70 MMHG | HEIGHT: 62 IN | OXYGEN SATURATION: 97 % | HEART RATE: 62 BPM

## 2018-06-11 DIAGNOSIS — M25.562 ACUTE PAIN OF BOTH KNEES: Primary | ICD-10-CM

## 2018-06-11 DIAGNOSIS — M25.562 ACUTE PAIN OF BOTH KNEES: ICD-10-CM

## 2018-06-11 DIAGNOSIS — M25.561 ACUTE PAIN OF BOTH KNEES: ICD-10-CM

## 2018-06-11 DIAGNOSIS — M25.561 ACUTE PAIN OF BOTH KNEES: Primary | ICD-10-CM

## 2018-06-11 PROCEDURE — 73562 X-RAY EXAM OF KNEE 3: CPT

## 2018-06-11 PROCEDURE — 99213 OFFICE O/P EST LOW 20 MIN: CPT | Performed by: PHYSICIAN ASSISTANT

## 2018-06-11 PROCEDURE — 3008F BODY MASS INDEX DOCD: CPT | Performed by: PHYSICIAN ASSISTANT

## 2018-06-11 NOTE — PATIENT INSTRUCTIONS
Quadriceps Exercises   WHAT YOU NEED TO KNOW:   What do I need to know about quadriceps exercises? Quadriceps exercises help reduce knee pain, keep muscles strong and flexible, and improve function after injury  · Start slow  These are beginning exercises  Ask your healthcare provider if you need to see a physical therapist for more advanced exercises  As you get stronger, you may be able to do more sets of each exercise or add weights  · Stop if you feel pain  It is normal to feel some discomfort at first  Regular exercise will help decrease your discomfort over time  · Warm up before you do quadriceps exercises  Ride a stationary bike or walk for 5 or 10 minutes to warm your muscles  · Follow the exercise program recommended by your healthcare provider  He will tell you which exercises are best for your condition  He will also tell you how many repetitions to do and how often you should do the exercises  How do I perform quadriceps stretches safely? · Kneeling hip flexor stretch:  Kneel on your left knee  Place your right foot in front of you  Keep your right knee bent and your foot flat on the floor  Your knee should be directly above your ankle  Put both of your hands on top of your right thigh  Keep your back straight and abdominal muscles tight  Put weight on your right leg and lean forward until you feel a stretch in your left thigh  Hold the stretch for about 30 seconds  Repeat 2 or 3 times on each leg or as directed  · Standing quadriceps stretch:  Stand and place one hand against a wall or hold the back of a chair for balance  With your weight on one leg, bend your other leg and grab your ankle  Bring your heel toward your buttocks  Hold the stretch for 30 to 60 seconds  Switch legs  Repeat 2 or 3 times on each leg  How do I perform quadriceps strengthening exercises safely? · Quad set exercise:  Lie on a flat, firm surface   Bend your left leg until your foot is flat on the floor  Keep your right leg straight  Tighten the top of your right thigh and hold for 10 to 20 seconds, and then relax  Repeat this exercise 5 to 10 times and then repeat on your other leg  · Straight leg lift:  Lie on a flat, firm surface  Bend your left leg until your foot is flat on the floor  Raise your right leg several inches off the floor and hold for 5 to 10 seconds  Lower your leg slowly  Repeat this exercise 5 to 10 times and then repeat on your other leg  · Sitting leg lifts:  Sit in a chair  Slowly straighten and raise one leg  Squeeze your thigh muscles and hold for 5 seconds  Relax and return your foot to the floor  Do 2 sets of 10 lifts on each leg  · Standing half squats:  Stand with your feet shoulder-width apart  Lean your back against a wall or hold the back of a chair for balance, if needed  Slowly sit down about 10 inches, as if you are going to sit in a chair  Your body weight should be mostly over your heels  Hold the squat for 5 seconds, then rise to a standing position  Repeat 10 times for 1 set  Rest for 1 to 2 minutes  Do another set of 10  · Step ups:  Use a 6-inch stool, step, or other platform to do this exercise  Place one foot on top of the platform  Lift your other foot off the floor and let it hang loosely  Stay in that position for 3 to 5 seconds  Then, slowly lower your foot to the floor  Lower your other foot off the platform surface and onto the floor  Repeat this exercise 5 to 10 times and then repeat on your other leg  When should I contact my healthcare provider? · Your pain becomes worse or you have new pain  · You have questions or concerns about your condition, care, or exercise program   CARE AGREEMENT:   You have the right to help plan your care  Learn about your health condition and how it may be treated  Discuss treatment options with your caregivers to decide what care you want to receive   You always have the right to refuse treatment  The above information is an  only  It is not intended as medical advice for individual conditions or treatments  Talk to your doctor, nurse or pharmacist before following any medical regimen to see if it is safe and effective for you  © 2017 [employer]  Information is for End User's use only and may not be sold, redistributed or otherwise used for commercial purposes  All illustrations and images included in CareNotes® are the copyrighted property of A [employer]  or [employer]  Acute pain of both knees  The patient was encouraged to check knee x-rays as above and then also try PT for the knees  She can wear a supportive brace but was encouraged to not wear continuously  She was directed to start 2 Aleve twice daily with food if needed for relief  She should call if not improving with Pt over the next few weeks and we can refer her to Orthopedics

## 2018-06-11 NOTE — ASSESSMENT & PLAN NOTE
The patient was encouraged to check knee x-rays as above and then also try PT for the knees  She can wear a supportive brace but was encouraged to not wear continuously  She was directed to start 2 Aleve twice daily with food if needed for relief  She should call if not improving with Pt over the next few weeks and we can refer her to Orthopedics

## 2018-06-12 PROBLEM — R42 DIZZINESS: Status: ACTIVE | Noted: 2018-06-12

## 2018-06-12 PROBLEM — M70.60 TROCHANTERIC BURSITIS: Status: ACTIVE | Noted: 2018-06-12

## 2018-06-12 PROBLEM — M25.559 HIP PAIN: Status: ACTIVE | Noted: 2018-06-12

## 2018-06-12 PROBLEM — R51.9 HEADACHE: Status: ACTIVE | Noted: 2018-06-12

## 2018-10-24 ENCOUNTER — IMMUNIZATION (OUTPATIENT)
Dept: FAMILY MEDICINE CLINIC | Facility: CLINIC | Age: 63
End: 2018-10-24
Payer: COMMERCIAL

## 2018-10-24 DIAGNOSIS — Z23 FLU VACCINE NEED: Primary | ICD-10-CM

## 2018-10-24 DIAGNOSIS — Z12.31 ENCOUNTER FOR SCREENING MAMMOGRAM FOR BREAST CANCER: ICD-10-CM

## 2018-10-24 PROCEDURE — 90471 IMMUNIZATION ADMIN: CPT

## 2018-10-24 PROCEDURE — 90682 RIV4 VACC RECOMBINANT DNA IM: CPT

## 2018-10-24 NOTE — PROGRESS NOTES
Pt came in for a Flu shot  I seen she is over-due for Mammogram and PE   Yesenia ordered and scheduled pt to be seen on 12/3/2018

## 2018-11-06 DIAGNOSIS — E03.9 ACQUIRED HYPOTHYROIDISM: ICD-10-CM

## 2018-11-06 RX ORDER — LEVOTHYROXINE SODIUM 0.07 MG/1
TABLET ORAL
Qty: 30 TABLET | Refills: 5 | Status: SHIPPED | OUTPATIENT
Start: 2018-11-06 | End: 2018-12-03 | Stop reason: SDUPTHER

## 2018-12-03 ENCOUNTER — OFFICE VISIT (OUTPATIENT)
Dept: FAMILY MEDICINE CLINIC | Facility: CLINIC | Age: 63
End: 2018-12-03
Payer: COMMERCIAL

## 2018-12-03 VITALS
SYSTOLIC BLOOD PRESSURE: 136 MMHG | HEIGHT: 62 IN | BODY MASS INDEX: 27.79 KG/M2 | HEART RATE: 84 BPM | WEIGHT: 151 LBS | RESPIRATION RATE: 18 BRPM | DIASTOLIC BLOOD PRESSURE: 76 MMHG | OXYGEN SATURATION: 98 %

## 2018-12-03 DIAGNOSIS — Z11.59 NEED FOR HEPATITIS C SCREENING TEST: ICD-10-CM

## 2018-12-03 DIAGNOSIS — E03.9 ACQUIRED HYPOTHYROIDISM: ICD-10-CM

## 2018-12-03 DIAGNOSIS — Z78.0 POST-MENOPAUSAL: ICD-10-CM

## 2018-12-03 DIAGNOSIS — E78.00 HYPERCHOLESTEROLEMIA: ICD-10-CM

## 2018-12-03 DIAGNOSIS — M25.50 ARTHRALGIA, UNSPECIFIED JOINT: ICD-10-CM

## 2018-12-03 DIAGNOSIS — Z00.00 ANNUAL PHYSICAL EXAM: Primary | ICD-10-CM

## 2018-12-03 DIAGNOSIS — E55.9 VITAMIN D DEFICIENCY: ICD-10-CM

## 2018-12-03 DIAGNOSIS — M79.10 MYALGIA: ICD-10-CM

## 2018-12-03 DIAGNOSIS — K59.09 CHRONIC CONSTIPATION: ICD-10-CM

## 2018-12-03 PROCEDURE — 99396 PREV VISIT EST AGE 40-64: CPT | Performed by: FAMILY MEDICINE

## 2018-12-03 RX ORDER — LEVOTHYROXINE SODIUM 0.07 MG/1
75 TABLET ORAL DAILY
Qty: 90 TABLET | Refills: 3 | Status: SHIPPED | OUTPATIENT
Start: 2018-12-03 | End: 2019-12-12 | Stop reason: SDUPTHER

## 2018-12-03 NOTE — PROGRESS NOTES
ADULT ANNUAL PHYSICAL  [facility]    NAME: Fiordaliza   AGE: 66 y o  SEX: female  : 1955     DATE: 12/3/2018     Assessment and Plan:     Problem List Items Addressed This Visit        Digestive    Chronic constipation     I am going to consult GI for this as well as some increased belching         Relevant Orders    Comprehensive metabolic panel       Endocrine    Hypothyroidism    Relevant Medications    levothyroxine 75 mcg tablet    Other Relevant Orders    TSH, 3rd generation with Free T4 reflex       Other    Hypercholesterolemia    Relevant Orders    Lipid Panel with Direct LDL reflex      Other Visit Diagnoses     Annual physical exam    -  Primary    Need for hepatitis C screening test        Relevant Orders    Hepatitis C antibody    Post-menopausal        Relevant Orders    DXA bone density spine hip and pelvis    Arthralgia, unspecified joint        Relevant Orders    DENICE w/Reflex if Positive    C-reactive protein    Lyme Antibody Profile with reflex to WB    RF Screen w/ Reflex to Titer    CBC and differential    Myalgia        Relevant Orders    CK    C-reactive protein          Health maintenance and preventative care screenings were discussed with patient today  Appropriate education was printed on patient's after visit summary  · Discussed risks/benefits of screening for breast cancer, colorectal cancer, high cholesterol and diabetes  Patient agrees to screening for breast cancer and cervical cancer  · Immunizations were reviewed: patient is up-to-date with her immunizations  Counseling:  · Dental Health: discussed importance of regular tooth brushing, flossing, and dental visits  Return in about 6 months (around 6/3/2019)       Chief Complaint:     Chief Complaint   Patient presents with    Annual Exam      History of Present Illness:     Adult Annual Physical   Patient here for a comprehensive physical exam  The patient reports problems - Still having some diffuse pain in her legs as well as her cervical spine in particular     She notes having diffuse pain over the past year so  She believes this is due to some deconditioning as well as her aging process  She denies any acute injuries  She notes she has been very inactive and very rarely exercises  She denies any fever or chills  She has noted no rash  She has a history of hypothyroidism denies excessive fatigue, constipation or dry skin  She has some chronic constipation for the past 2 years or so  She also complains of increased belching over the past 2-3 months  Diet and Physical Activity  · Diet/Nutrition: well balanced diet  · Weight concerns: patient is overweight (BMI 25 0-29 9)  · Exercise: no formal exercise  Depression Screening  PHQ-9 Depression Screening    PHQ-9:    Frequency of the following problems over the past two weeks:       Little interest or pleasure in doing things:  0 - not at all  Feeling down, depressed, or hopeless:  0 - not at all  PHQ-2 Score:  0       General Health  · Sleep: sleeps well  · Hearing: normal - bilateral   · Vision: goes for regular eye exams  · Dental: regular dental visits  /GYN Health  · Patient is: postmenopausal  · Menopausal symptoms: myalgias/arthralgias (mild) and vasomotor symptoms began several years ago, occur random times per day, and last about a few seconds        Review of Systems:     Review of Systems   Constitutional: Negative for appetite change, chills, fatigue, fever and unexpected weight change  HENT: Negative for trouble swallowing  Eyes: Negative for visual disturbance  Respiratory: Negative for cough, chest tightness, shortness of breath and wheezing  Cardiovascular: Negative for chest pain  Gastrointestinal: Positive for constipation (mild)  Negative for abdominal distention, abdominal pain, blood in stool and diarrhea  Endocrine: Negative for polyuria     Genitourinary: Negative for difficulty urinating and flank pain  Musculoskeletal: Negative for arthralgias and myalgias  Skin: Negative for rash  Neurological: Negative for dizziness and light-headedness  Hematological: Negative for adenopathy  Does not bruise/bleed easily  Psychiatric/Behavioral: Negative for sleep disturbance  Past Medical History:     Past Medical History:   Diagnosis Date    Anxiety     last assessed 12/1/14, resolved 2/1/16    Hematuria     last assessed 4/23/15, resolved 2/1/16    Skin neoplasm     last assessed 9/18/15, resolved 2/1/16      Past Surgical History:     Past Surgical History:   Procedure Laterality Date    SHOULDER SURGERY Left 2009      Social History:     Social History     Social History    Marital status:      Spouse name: N/A    Number of children: N/A    Years of education: N/A     Social History Main Topics    Smoking status: Never Smoker    Smokeless tobacco: Never Used      Comment: Former smoker per [employer]     Alcohol use Yes      Comment: occasionally / social     Drug use: No    Sexual activity: Not on file     Other Topics Concern    Not on file     Social History Narrative    No narrative on file      Family History:     Family History   Problem Relation Age of Onset    Hyperlipidemia Mother     Cancer Father         of spine    Stroke Father         syndrome       Current Medications:     Current Outpatient Prescriptions   Medication Sig Dispense Refill    Cholecalciferol (VITAMIN D3) 1000 units CAPS Take 2 capsules by mouth daily      levothyroxine 75 mcg tablet Take 1 tablet (75 mcg total) by mouth daily 90 tablet 3     No current facility-administered medications for this visit  Allergies:      Allergies   Allergen Reactions    Albuterol      Other reaction(s): SULFA (SULFONAMIDES) (Rash)    Sulfa Antibiotics Rash      Objective:     /76   Pulse 84   Resp 18   Ht 5' 2" (1 575 m)   Wt 68 5 kg (151 lb)   SpO2 98% BMI 27 62 kg/m²     Physical Exam   Constitutional: She appears well-developed and well-nourished  No distress  HENT:   Head: Normocephalic  Right Ear: External ear normal    Left Ear: External ear normal    Nose: Nose normal    Mouth/Throat: Oropharynx is clear and moist    Eyes: Pupils are equal, round, and reactive to light  Conjunctivae and EOM are normal  Right eye exhibits no discharge  Left eye exhibits no discharge  No scleral icterus  Neck: No tracheal deviation present  No thyromegaly present  Cardiovascular: Normal rate, regular rhythm and normal heart sounds  No murmur heard  Pulmonary/Chest: Effort normal and breath sounds normal  No respiratory distress  Abdominal: Soft  Bowel sounds are normal  She exhibits no distension  There is no tenderness  Musculoskeletal: Normal range of motion  She exhibits no edema, tenderness or deformity  Lymphadenopathy:     She has no cervical adenopathy  Neurological: No cranial nerve deficit  Coordination normal    Skin: Skin is warm  No rash noted  She is not diaphoretic  No erythema  No pallor  Psychiatric: She has a normal mood and affect   Her behavior is normal         Health Maintenance:     Health Maintenance   Topic Date Due    Hepatitis C Screening  1955    DXA SCAN  1955    PAP SMEAR  08/26/1978    MAMMOGRAM  08/24/2018    Depression Screening PHQ  05/29/2019    CRC Screening: Colonoscopy  07/13/2020    DTaP,Tdap,and Td Vaccines (2 - Td) 02/01/2026    INFLUENZA VACCINE  Completed     Immunization History   Administered Date(s) Administered    Influenza 12/07/2010, 09/11/2015, 11/02/2017    Influenza Quadrivalent Preservative Free 3 years and older IM 11/02/2017    Influenza Quadrivalent, 6-35 Months IM 09/11/2015, 09/22/2016    Influenza, recombinant, quadrivalent,injectable, preservative free 10/24/2018    Tdap 02/01/2016       Denisha Jaramillo MD  [facility]

## 2019-03-22 ENCOUNTER — APPOINTMENT (OUTPATIENT)
Dept: LAB | Facility: CLINIC | Age: 64
End: 2019-03-22
Payer: COMMERCIAL

## 2019-03-22 DIAGNOSIS — E78.00 HYPERCHOLESTEROLEMIA: ICD-10-CM

## 2019-03-22 DIAGNOSIS — M25.50 ARTHRALGIA, UNSPECIFIED JOINT: ICD-10-CM

## 2019-03-22 DIAGNOSIS — M79.10 MYALGIA: ICD-10-CM

## 2019-03-22 DIAGNOSIS — E03.9 ACQUIRED HYPOTHYROIDISM: ICD-10-CM

## 2019-03-22 DIAGNOSIS — Z11.59 NEED FOR HEPATITIS C SCREENING TEST: ICD-10-CM

## 2019-03-22 DIAGNOSIS — K59.09 CHRONIC CONSTIPATION: ICD-10-CM

## 2019-03-22 DIAGNOSIS — E55.9 VITAMIN D DEFICIENCY: ICD-10-CM

## 2019-03-22 LAB
25(OH)D3 SERPL-MCNC: 29.1 NG/ML (ref 30–100)
ALBUMIN SERPL BCP-MCNC: 4.1 G/DL (ref 3.5–5)
ALP SERPL-CCNC: 60 U/L (ref 46–116)
ALT SERPL W P-5'-P-CCNC: 47 U/L (ref 12–78)
ANION GAP SERPL CALCULATED.3IONS-SCNC: 4 MMOL/L (ref 4–13)
AST SERPL W P-5'-P-CCNC: 22 U/L (ref 5–45)
BASOPHILS # BLD AUTO: 0.03 THOUSANDS/ΜL (ref 0–0.1)
BASOPHILS NFR BLD AUTO: 1 % (ref 0–1)
BILIRUB SERPL-MCNC: 0.68 MG/DL (ref 0.2–1)
BUN SERPL-MCNC: 13 MG/DL (ref 5–25)
CALCIUM SERPL-MCNC: 9.1 MG/DL (ref 8.3–10.1)
CHLORIDE SERPL-SCNC: 104 MMOL/L (ref 100–108)
CHOLEST SERPL-MCNC: 238 MG/DL (ref 50–200)
CK MB SERPL-MCNC: 1.2 % (ref 0–2.5)
CK MB SERPL-MCNC: 1.6 NG/ML (ref 0–5)
CK SERPL-CCNC: 138 U/L (ref 26–192)
CO2 SERPL-SCNC: 29 MMOL/L (ref 21–32)
CREAT SERPL-MCNC: 0.62 MG/DL (ref 0.6–1.3)
CRP SERPL QL: <3 MG/L
EOSINOPHIL # BLD AUTO: 0.03 THOUSAND/ΜL (ref 0–0.61)
EOSINOPHIL NFR BLD AUTO: 1 % (ref 0–6)
ERYTHROCYTE [DISTWIDTH] IN BLOOD BY AUTOMATED COUNT: 12.2 % (ref 11.6–15.1)
GFR SERPL CREATININE-BSD FRML MDRD: 98 ML/MIN/1.73SQ M
GLUCOSE P FAST SERPL-MCNC: 103 MG/DL (ref 65–99)
HCT VFR BLD AUTO: 41.4 % (ref 34.8–46.1)
HDLC SERPL-MCNC: 56 MG/DL (ref 40–60)
HGB BLD-MCNC: 13.1 G/DL (ref 11.5–15.4)
IMM GRANULOCYTES # BLD AUTO: 0.02 THOUSAND/UL (ref 0–0.2)
IMM GRANULOCYTES NFR BLD AUTO: 0 % (ref 0–2)
LDLC SERPL CALC-MCNC: 153 MG/DL (ref 0–100)
LYMPHOCYTES # BLD AUTO: 1.81 THOUSANDS/ΜL (ref 0.6–4.47)
LYMPHOCYTES NFR BLD AUTO: 40 % (ref 14–44)
MCH RBC QN AUTO: 31.1 PG (ref 26.8–34.3)
MCHC RBC AUTO-ENTMCNC: 31.6 G/DL (ref 31.4–37.4)
MCV RBC AUTO: 98 FL (ref 82–98)
MONOCYTES # BLD AUTO: 0.37 THOUSAND/ΜL (ref 0.17–1.22)
MONOCYTES NFR BLD AUTO: 8 % (ref 4–12)
NEUTROPHILS # BLD AUTO: 2.23 THOUSANDS/ΜL (ref 1.85–7.62)
NEUTS SEG NFR BLD AUTO: 50 % (ref 43–75)
NRBC BLD AUTO-RTO: 0 /100 WBCS
PLATELET # BLD AUTO: 300 THOUSANDS/UL (ref 149–390)
PMV BLD AUTO: 10.3 FL (ref 8.9–12.7)
POTASSIUM SERPL-SCNC: 3.8 MMOL/L (ref 3.5–5.3)
PROT SERPL-MCNC: 7.2 G/DL (ref 6.4–8.2)
RBC # BLD AUTO: 4.21 MILLION/UL (ref 3.81–5.12)
SODIUM SERPL-SCNC: 137 MMOL/L (ref 136–145)
TRIGL SERPL-MCNC: 146 MG/DL
TSH SERPL DL<=0.05 MIU/L-ACNC: 0.81 UIU/ML (ref 0.36–3.74)
WBC # BLD AUTO: 4.49 THOUSAND/UL (ref 4.31–10.16)

## 2019-03-22 PROCEDURE — 82553 CREATINE MB FRACTION: CPT | Performed by: FAMILY MEDICINE

## 2019-03-22 PROCEDURE — 86618 LYME DISEASE ANTIBODY: CPT | Performed by: FAMILY MEDICINE

## 2019-03-22 PROCEDURE — 86038 ANTINUCLEAR ANTIBODIES: CPT

## 2019-03-22 PROCEDURE — 84443 ASSAY THYROID STIM HORMONE: CPT

## 2019-03-22 PROCEDURE — 86803 HEPATITIS C AB TEST: CPT

## 2019-03-22 PROCEDURE — 82550 ASSAY OF CK (CPK): CPT | Performed by: FAMILY MEDICINE

## 2019-03-22 PROCEDURE — 82306 VITAMIN D 25 HYDROXY: CPT

## 2019-03-22 PROCEDURE — 86430 RHEUMATOID FACTOR TEST QUAL: CPT | Performed by: FAMILY MEDICINE

## 2019-03-22 PROCEDURE — 80061 LIPID PANEL: CPT

## 2019-03-22 PROCEDURE — 80053 COMPREHEN METABOLIC PANEL: CPT

## 2019-03-22 PROCEDURE — 86140 C-REACTIVE PROTEIN: CPT | Performed by: FAMILY MEDICINE

## 2019-03-22 PROCEDURE — 36415 COLL VENOUS BLD VENIPUNCTURE: CPT | Performed by: FAMILY MEDICINE

## 2019-03-22 PROCEDURE — 85025 COMPLETE CBC W/AUTO DIFF WBC: CPT

## 2019-03-23 LAB
HCV AB SER QL: NORMAL
RHEUMATOID FACT SER QL LA: NEGATIVE

## 2019-03-25 LAB
B BURGDOR IGG SER IA-ACNC: 0.08
B BURGDOR IGM SER IA-ACNC: 0.37
RYE IGE QN: NEGATIVE

## 2019-06-24 ENCOUNTER — HOSPITAL ENCOUNTER (OUTPATIENT)
Dept: BONE DENSITY | Facility: MEDICAL CENTER | Age: 64
Discharge: HOME | End: 2019-06-24
Payer: COMMERCIAL

## 2019-06-24 ENCOUNTER — HOSPITAL ENCOUNTER (OUTPATIENT)
Dept: MAMMOGRAPHY | Facility: MEDICAL CENTER | Age: 64
Discharge: HOME | End: 2019-06-24
Payer: COMMERCIAL

## 2019-06-24 VITALS — WEIGHT: 140 LBS | HEIGHT: 62 IN | BODY MASS INDEX: 25.76 KG/M2

## 2019-06-24 DIAGNOSIS — Z78.0 POST-MENOPAUSAL: ICD-10-CM

## 2019-06-24 DIAGNOSIS — Z12.31 ENCOUNTER FOR SCREENING MAMMOGRAM FOR BREAST CANCER: ICD-10-CM

## 2019-06-24 PROCEDURE — 77063 BREAST TOMOSYNTHESIS BI: CPT

## 2019-06-24 PROCEDURE — 77067 SCR MAMMO BI INCL CAD: CPT

## 2019-06-24 PROCEDURE — 77080 DXA BONE DENSITY AXIAL: CPT

## 2019-07-08 ENCOUNTER — OFFICE VISIT (OUTPATIENT)
Dept: FAMILY MEDICINE CLINIC | Facility: CLINIC | Age: 64
End: 2019-07-08
Payer: COMMERCIAL

## 2019-07-08 VITALS
SYSTOLIC BLOOD PRESSURE: 138 MMHG | DIASTOLIC BLOOD PRESSURE: 70 MMHG | WEIGHT: 141 LBS | RESPIRATION RATE: 18 BRPM | OXYGEN SATURATION: 97 % | HEIGHT: 62 IN | BODY MASS INDEX: 25.95 KG/M2 | HEART RATE: 56 BPM

## 2019-07-08 DIAGNOSIS — E66.3 OVERWEIGHT WITH BODY MASS INDEX (BMI) 25.0-29.9: ICD-10-CM

## 2019-07-08 DIAGNOSIS — N39.0 RECURRENT UTI: ICD-10-CM

## 2019-07-08 DIAGNOSIS — E55.9 VITAMIN D DEFICIENCY: ICD-10-CM

## 2019-07-08 DIAGNOSIS — E03.9 ACQUIRED HYPOTHYROIDISM: Primary | ICD-10-CM

## 2019-07-08 DIAGNOSIS — M85.852 OSTEOPENIA OF LEFT HIP: ICD-10-CM

## 2019-07-08 DIAGNOSIS — R73.9 HYPERGLYCEMIA: ICD-10-CM

## 2019-07-08 DIAGNOSIS — E78.00 HYPERCHOLESTEROLEMIA: ICD-10-CM

## 2019-07-08 PROBLEM — M25.561 ACUTE PAIN OF BOTH KNEES: Status: RESOLVED | Noted: 2018-06-11 | Resolved: 2019-07-08

## 2019-07-08 PROBLEM — R42 DIZZINESS: Status: RESOLVED | Noted: 2018-06-12 | Resolved: 2019-07-08

## 2019-07-08 PROBLEM — M25.562 ACUTE PAIN OF BOTH KNEES: Status: RESOLVED | Noted: 2018-06-11 | Resolved: 2019-07-08

## 2019-07-08 PROBLEM — M70.60 TROCHANTERIC BURSITIS: Status: RESOLVED | Noted: 2018-06-12 | Resolved: 2019-07-08

## 2019-07-08 LAB
SL AMB  POCT GLUCOSE, UA: NEGATIVE
SL AMB LEUKOCYTE ESTERASE,UA: ABNORMAL
SL AMB POCT BILIRUBIN,UA: NEGATIVE
SL AMB POCT BLOOD,UA: ABNORMAL
SL AMB POCT CLARITY,UA: CLEAR
SL AMB POCT COLOR,UA: YELLOW
SL AMB POCT KETONES,UA: NEGATIVE
SL AMB POCT NITRITE,UA: NEGATIVE
SL AMB POCT PH,UA: 8
SL AMB POCT SPECIFIC GRAVITY,UA: 1
SL AMB POCT URINE PROTEIN: NEGATIVE
SL AMB POCT UROBILINOGEN: 0.2

## 2019-07-08 PROCEDURE — 1036F TOBACCO NON-USER: CPT | Performed by: FAMILY MEDICINE

## 2019-07-08 PROCEDURE — 81002 URINALYSIS NONAUTO W/O SCOPE: CPT | Performed by: FAMILY MEDICINE

## 2019-07-08 PROCEDURE — 87086 URINE CULTURE/COLONY COUNT: CPT | Performed by: FAMILY MEDICINE

## 2019-07-08 PROCEDURE — 3008F BODY MASS INDEX DOCD: CPT | Performed by: FAMILY MEDICINE

## 2019-07-08 PROCEDURE — 99214 OFFICE O/P EST MOD 30 MIN: CPT | Performed by: FAMILY MEDICINE

## 2019-07-08 NOTE — PATIENT INSTRUCTIONS
Recurrent UTI  Check urine culture today  Urine dip revealed trace leukocytes and trace blood  Osteopenia of left hip  Patient was encouraged to take 2000 International Units of vitamin D3 daily and 1000 mg of calcium on a daily basis as well  Continue to practice weight-bearing exercise

## 2019-07-08 NOTE — PROGRESS NOTES
Assessment/Plan:       Problem List Items Addressed This Visit        Endocrine    Hypothyroidism - Primary    Relevant Orders    TSH, 3rd generation with Free T4 reflex       Musculoskeletal and Integument    Osteopenia of left hip     Patient was encouraged to take 2000 International Units of vitamin D3 daily and 1000 mg of calcium on a daily basis as well  Continue to practice weight-bearing exercise  Genitourinary    Recurrent UTI     Check urine culture today  Urine dip revealed trace leukocytes and trace blood  Relevant Orders    POCT urine dip (Completed)    Urine culture       Other    Hypercholesterolemia    Relevant Orders    Comprehensive metabolic panel    Lipid Panel with Direct LDL reflex    Vitamin D deficiency    Relevant Orders    Vitamin D 25 hydroxy      Other Visit Diagnoses     Overweight with body mass index (BMI) 25 0-29 9        Hyperglycemia        Relevant Orders    Hemoglobin A1C        BMI Counseling: Body mass index is 25 79 kg/m²  Discussed the patient's BMI with her  The BMI is above average  BMI counseling and education was provided to the patient  Nutrition recommendations include reducing portion sizes, decreasing overall calorie intake and 3-5 servings of fruits/vegetables daily  Exercise recommendations include moderate aerobic physical activity for 150 minutes/week  Subjective:      Patient ID: Shonda Rincon is a 66 y o  female  HPI patient presents today for follow-up for chronic health issues  She has been working on losing some weight and is down 10 lb  She notes that her stress level has decreased significantly since she has left her previous job and is considering [employer]  She was having a lot of stress which led to some reflux as well as diffuse arthralgias and muscle discomfort  Lab work revealed no rheumatologic disease process  She has been more active and feels that she is overall feeling much better    She denies any current problems chest pain or reflux  She denies heartburn or dysphagia  She does have some mild dysuria and has had 3 recent urinary tract infections [facility]   She was treated in November and twice in May for infection  She notes these do typically occur after sexual activity  She tries to maintain hydration and void after sexual activity  She currently denies flank pain or hematuria  She has history of some headaches as well as chronic neck pain which have improved significantly since she has left her previous job at the [employer]  She does have a history of hypothyroidism and denies any excessive fatigue, dry skin but does have some mild intermittent constipation  The following portions of the patient's history were reviewed and updated as appropriate: allergies, current medications, past family history, past medical history, past social history, past surgical history and problem list       Current Outpatient Medications:     Cholecalciferol (VITAMIN D3) 1000 units CAPS, Take 2 capsules by mouth daily, Disp: , Rfl:     levothyroxine 75 mcg tablet, Take 1 tablet (75 mcg total) by mouth daily, Disp: 90 tablet, Rfl: 3     Review of Systems   Constitutional: Negative for appetite change, chills, fatigue, fever and unexpected weight change  HENT: Negative for congestion and trouble swallowing  Eyes: Negative for visual disturbance  Respiratory: Negative for cough, chest tightness, shortness of breath and wheezing  Cardiovascular: Negative for chest pain  Gastrointestinal: Negative for abdominal distention, abdominal pain, blood in stool, constipation and diarrhea  Endocrine: Negative for polyuria  Genitourinary: Negative for decreased urine volume, difficulty urinating, flank pain, hematuria and urgency  Musculoskeletal: Negative for arthralgias and myalgias  Skin: Negative for rash  Neurological: Negative for dizziness and light-headedness     Hematological: Negative for adenopathy  Does not bruise/bleed easily  Psychiatric/Behavioral: Negative for sleep disturbance  Objective:      /70   Pulse 56   Resp 18   Ht 5' 2" (1 575 m)   Wt 64 kg (141 lb)   SpO2 97%   BMI 25 79 kg/m²          Physical Exam   Constitutional: She is oriented to person, place, and time  She appears well-developed and well-nourished  No distress  HENT:   Head: Normocephalic  Eyes: Pupils are equal, round, and reactive to light  Right eye exhibits no discharge  Left eye exhibits no discharge  Neck: No tracheal deviation present  No thyromegaly present  Cardiovascular: Normal rate, regular rhythm and normal heart sounds  No murmur heard  Pulmonary/Chest: Effort normal  No respiratory distress  She has no wheezes  She has no rales  Abdominal: Soft  She exhibits no distension  There is no tenderness  Musculoskeletal: Normal range of motion  She exhibits no edema  Lymphadenopathy:     She has no cervical adenopathy  Neurological: She is alert and oriented to person, place, and time  No cranial nerve deficit  Skin: Skin is warm  She is not diaphoretic  No erythema  Psychiatric: She has a normal mood and affect   Judgment and thought content normal          Noa Bowen MD

## 2019-07-10 LAB — BACTERIA UR CULT: NORMAL

## 2019-10-22 ENCOUNTER — IMMUNIZATIONS (OUTPATIENT)
Dept: FAMILY MEDICINE CLINIC | Facility: CLINIC | Age: 64
End: 2019-10-22
Payer: COMMERCIAL

## 2019-10-22 DIAGNOSIS — Z23 FLU VACCINE NEED: Primary | ICD-10-CM

## 2019-10-22 PROCEDURE — 90471 IMMUNIZATION ADMIN: CPT

## 2019-10-22 PROCEDURE — 90682 RIV4 VACC RECOMBINANT DNA IM: CPT

## 2019-12-05 ENCOUNTER — OFFICE VISIT (OUTPATIENT)
Dept: FAMILY MEDICINE CLINIC | Facility: CLINIC | Age: 64
End: 2019-12-05
Payer: COMMERCIAL

## 2019-12-05 VITALS
SYSTOLIC BLOOD PRESSURE: 114 MMHG | WEIGHT: 142 LBS | BODY MASS INDEX: 25.97 KG/M2 | DIASTOLIC BLOOD PRESSURE: 78 MMHG | TEMPERATURE: 98.6 F | HEART RATE: 63 BPM | OXYGEN SATURATION: 98 %

## 2019-12-05 DIAGNOSIS — J06.9 VIRAL UPPER RESPIRATORY TRACT INFECTION: Primary | ICD-10-CM

## 2019-12-05 PROCEDURE — 1036F TOBACCO NON-USER: CPT | Performed by: FAMILY MEDICINE

## 2019-12-05 PROCEDURE — 99213 OFFICE O/P EST LOW 20 MIN: CPT | Performed by: FAMILY MEDICINE

## 2019-12-05 NOTE — PROGRESS NOTES
Assessment/Plan:    She appears to have viral upper respiratory infection  Will treat symptomatically with Tylenol alternating with ibuprofen about every 3 hours for sore throat pain and body aches as well as increased hydration with water  Also recommended saline nasal spray and use of Mucinex during the day  She should contact us if symptoms are not improving  Diagnoses and all orders for this visit:    Viral upper respiratory tract infection          Subjective:      Patient ID: Alysa Dumont is a 60 y o  female  She has cold symptoms which began couple days ago  It began with sore throat then she developed nasal congestion  Minimal coughing  She denies fever or chills  She has not tried any medications  URI    This is a new problem  The current episode started in the past 7 days  The problem has been gradually worsening  There has been no fever  Associated symptoms include congestion, coughing, headaches, a plugged ear sensation and a sore throat  Pertinent negatives include no diarrhea, nausea or vomiting  She has tried nothing for the symptoms  The following portions of the patient's history were reviewed and updated as appropriate: allergies, current medications, past family history, past medical history, past social history, past surgical history and problem list     Review of Systems   Constitutional: Positive for fatigue  HENT: Positive for congestion and sore throat  Respiratory: Positive for cough  Negative for shortness of breath  Gastrointestinal: Negative for diarrhea, nausea and vomiting  Neurological: Positive for headaches  Objective:      /78 (BP Location: Left arm, Patient Position: Sitting, Cuff Size: Standard)   Pulse 63   Temp 98 6 °F (37 °C) (Tympanic)   Wt 64 4 kg (142 lb)   SpO2 98%   BMI 25 97 kg/m²          Physical Exam   Constitutional: She is oriented to person, place, and time  She appears well-developed and well-nourished  HENT:   Head: Normocephalic and atraumatic  Right Ear: External ear normal    Nose with edematous mucosa and serous drainage  Pharynx mildly red without exudate   Eyes: Pupils are equal, round, and reactive to light  EOM are normal    Neck: Normal range of motion  Neck supple  No thyromegaly present  Cardiovascular: Normal rate, regular rhythm, normal heart sounds and intact distal pulses  Pulmonary/Chest: Effort normal and breath sounds normal    Lymphadenopathy:     She has no cervical adenopathy  Neurological: She is alert and oriented to person, place, and time  Skin: Skin is warm and dry  Psychiatric: She has a normal mood and affect  Her behavior is normal    Nursing note and vitals reviewed

## 2019-12-12 DIAGNOSIS — E03.9 ACQUIRED HYPOTHYROIDISM: ICD-10-CM

## 2019-12-13 RX ORDER — LEVOTHYROXINE SODIUM 0.07 MG/1
TABLET ORAL
Qty: 30 TABLET | Refills: 11 | Status: SHIPPED | OUTPATIENT
Start: 2019-12-13 | End: 2020-12-11

## 2019-12-30 ENCOUNTER — TRANSCRIBE ORDERS (OUTPATIENT)
Dept: LAB | Facility: CLINIC | Age: 64
End: 2019-12-30

## 2019-12-30 ENCOUNTER — APPOINTMENT (OUTPATIENT)
Dept: LAB | Facility: CLINIC | Age: 64
End: 2019-12-30
Payer: COMMERCIAL

## 2019-12-30 DIAGNOSIS — E55.9 VITAMIN D DEFICIENCY: ICD-10-CM

## 2019-12-30 DIAGNOSIS — E78.00 HYPERCHOLESTEROLEMIA: ICD-10-CM

## 2019-12-30 DIAGNOSIS — E03.9 ACQUIRED HYPOTHYROIDISM: ICD-10-CM

## 2019-12-30 DIAGNOSIS — R73.9 HYPERGLYCEMIA: ICD-10-CM

## 2019-12-30 LAB
25(OH)D3 SERPL-MCNC: 15.6 NG/ML (ref 30–100)
ALBUMIN SERPL BCP-MCNC: 3.9 G/DL (ref 3.5–5)
ALP SERPL-CCNC: 58 U/L (ref 46–116)
ALT SERPL W P-5'-P-CCNC: 25 U/L (ref 12–78)
ANION GAP SERPL CALCULATED.3IONS-SCNC: 4 MMOL/L (ref 4–13)
AST SERPL W P-5'-P-CCNC: 11 U/L (ref 5–45)
BILIRUB SERPL-MCNC: 0.61 MG/DL (ref 0.2–1)
BUN SERPL-MCNC: 15 MG/DL (ref 5–25)
CALCIUM SERPL-MCNC: 9.4 MG/DL (ref 8.3–10.1)
CHLORIDE SERPL-SCNC: 104 MMOL/L (ref 100–108)
CHOLEST SERPL-MCNC: 247 MG/DL (ref 50–200)
CO2 SERPL-SCNC: 30 MMOL/L (ref 21–32)
CREAT SERPL-MCNC: 0.59 MG/DL (ref 0.6–1.3)
EST. AVERAGE GLUCOSE BLD GHB EST-MCNC: 105 MG/DL
GFR SERPL CREATININE-BSD FRML MDRD: 99 ML/MIN/1.73SQ M
GLUCOSE P FAST SERPL-MCNC: 100 MG/DL (ref 65–99)
HBA1C MFR BLD: 5.3 % (ref 4.2–6.3)
HDLC SERPL-MCNC: 60 MG/DL
LDLC SERPL CALC-MCNC: 149 MG/DL (ref 0–100)
POTASSIUM SERPL-SCNC: 4 MMOL/L (ref 3.5–5.3)
PROT SERPL-MCNC: 7.1 G/DL (ref 6.4–8.2)
SODIUM SERPL-SCNC: 138 MMOL/L (ref 136–145)
TRIGL SERPL-MCNC: 191 MG/DL
TSH SERPL DL<=0.05 MIU/L-ACNC: 1.86 UIU/ML (ref 0.36–3.74)

## 2019-12-30 PROCEDURE — 82306 VITAMIN D 25 HYDROXY: CPT

## 2019-12-30 PROCEDURE — 80053 COMPREHEN METABOLIC PANEL: CPT

## 2019-12-30 PROCEDURE — 86038 ANTINUCLEAR ANTIBODIES: CPT

## 2019-12-30 PROCEDURE — 80061 LIPID PANEL: CPT

## 2019-12-30 PROCEDURE — 36415 COLL VENOUS BLD VENIPUNCTURE: CPT

## 2019-12-30 PROCEDURE — 84443 ASSAY THYROID STIM HORMONE: CPT

## 2019-12-30 PROCEDURE — 83036 HEMOGLOBIN GLYCOSYLATED A1C: CPT

## 2020-01-02 LAB — RYE IGE QN: NEGATIVE

## 2020-01-06 ENCOUNTER — OFFICE VISIT (OUTPATIENT)
Dept: FAMILY MEDICINE CLINIC | Facility: CLINIC | Age: 65
End: 2020-01-06
Payer: COMMERCIAL

## 2020-01-06 VITALS
BODY MASS INDEX: 25.95 KG/M2 | HEIGHT: 62 IN | OXYGEN SATURATION: 99 % | WEIGHT: 141 LBS | SYSTOLIC BLOOD PRESSURE: 100 MMHG | TEMPERATURE: 98 F | DIASTOLIC BLOOD PRESSURE: 60 MMHG | HEART RATE: 75 BPM

## 2020-01-06 DIAGNOSIS — Z00.00 ANNUAL PHYSICAL EXAM: Primary | ICD-10-CM

## 2020-01-06 DIAGNOSIS — E03.9 ACQUIRED HYPOTHYROIDISM: ICD-10-CM

## 2020-01-06 DIAGNOSIS — G89.29 CHRONIC NECK PAIN: ICD-10-CM

## 2020-01-06 DIAGNOSIS — Z12.11 COLON CANCER SCREENING: ICD-10-CM

## 2020-01-06 DIAGNOSIS — R73.9 HYPERGLYCEMIA: ICD-10-CM

## 2020-01-06 DIAGNOSIS — G44.89 HEADACHE SYNDROME: ICD-10-CM

## 2020-01-06 DIAGNOSIS — M54.2 CHRONIC NECK PAIN: ICD-10-CM

## 2020-01-06 DIAGNOSIS — E66.3 OVERWEIGHT WITH BODY MASS INDEX (BMI) 25.0-29.9: ICD-10-CM

## 2020-01-06 DIAGNOSIS — E55.9 VITAMIN D DEFICIENCY: ICD-10-CM

## 2020-01-06 DIAGNOSIS — M85.852 OSTEOPENIA OF LEFT HIP: ICD-10-CM

## 2020-01-06 DIAGNOSIS — E78.00 HYPERCHOLESTEROLEMIA: ICD-10-CM

## 2020-01-06 PROBLEM — R51.9 HEADACHE: Status: RESOLVED | Noted: 2018-06-12 | Resolved: 2020-01-06

## 2020-01-06 PROBLEM — M62.89 PELVIC FLOOR DYSFUNCTION: Status: ACTIVE | Noted: 2019-12-18

## 2020-01-06 PROBLEM — R09.89 LEFT CAROTID BRUIT: Status: RESOLVED | Noted: 2017-05-12 | Resolved: 2020-01-06

## 2020-01-06 PROBLEM — N63.20 LEFT BREAST MASS: Status: ACTIVE | Noted: 2019-12-18

## 2020-01-06 PROBLEM — N95.2 ATROPHIC VAGINITIS: Status: ACTIVE | Noted: 2019-12-18

## 2020-01-06 PROCEDURE — 99396 PREV VISIT EST AGE 40-64: CPT | Performed by: FAMILY MEDICINE

## 2020-01-06 RX ORDER — ERGOCALCIFEROL (VITAMIN D2) 1250 MCG
50000 CAPSULE ORAL WEEKLY
Qty: 12 CAPSULE | Refills: 0 | Status: SHIPPED | OUTPATIENT
Start: 2020-01-06 | End: 2020-09-30 | Stop reason: ALTCHOICE

## 2020-01-06 NOTE — ASSESSMENT & PLAN NOTE
She would like to hold off on any further physical therapy at this time  But this may be helpful    I will also see how it goes when we replaced her vitamin-D

## 2020-01-06 NOTE — ASSESSMENT & PLAN NOTE
Continue with weight-bearing exercise  We are going to replace her vitamin-D and and she will continue with vitamin-D 2000 International Units daily and calcium 1000 mg daily

## 2020-01-06 NOTE — ASSESSMENT & PLAN NOTE
I am going to supplement her vitamin D with 00726 units weekly times 12 doses  She should then start taking calcium plus D  She should shoot for 1000 mg of calcium and 2000 International Units of vitamin D3 daily

## 2020-01-06 NOTE — PATIENT INSTRUCTIONS
Vitamin D deficiency   I am going to supplement her vitamin D with 28551 units weekly times 12 doses  She should then start taking calcium plus D  She should shoot for 1000 mg of calcium and 2000 International Units of vitamin D3 daily  Hyperglycemia   A1c is okay  Continue to monitor this routinely  Chronic neck pain    She would like to hold off on any further physical therapy at this time  But this may be helpful  I will also see how it goes when we replaced her vitamin-D  Headache syndrome  This is currently not bothering her routinely       Wellness Visit for Adults   AMBULATORY CARE:   A wellness visit  is when you see your healthcare provider to get screened for health problems  You can also get advice on how to stay healthy  Write down your questions so you remember to ask them  Ask your healthcare provider how often you should have a wellness visit  What happens at a wellness visit:  Your healthcare provider will ask about your health, and your family history of health problems  This includes high blood pressure, heart disease, and cancer  He or she will ask if you have symptoms that concern you, if you smoke, and about your mood  You may also be asked about your intake of medicines, supplements, food, and alcohol  Any of the following may be done:  · Your weight  will be checked  Your height may also be checked so your body mass index (BMI) can be calculated  Your BMI shows if you are at a healthy weight  · Your blood pressure  and heart rate will be checked  Your temperature may also be checked  · Blood and urine tests  may be done  Blood tests may be done to check your cholesterol levels  Abnormal cholesterol levels increase your risk for heart disease and stroke  You may also need a blood or urine test to check for diabetes if you are at increased risk  Urine tests may be done to look for signs of an infection or kidney disease       · A physical exam  includes checking your heartbeat and lungs with a stethoscope  Your healthcare provider may also check your skin to look for sun damage  · Screening tests  may be recommended  A screening test is done to check for diseases that may not cause symptoms  The screening tests you may need depend on your age, gender, family history, and lifestyle habits  For example, colorectal screening may be recommended if you are 48years old or older  Screening tests you need if you are a woman:   · A Pap smear  is used to screen for cervical cancer  Pap smears are usually done every 3 to 5 years depending on your age  You may need them more often if you have had abnormal Pap smear test results in the past  Ask your healthcare provider how often you should have a Pap smear  · A mammogram  is an x-ray of your breasts to screen for breast cancer  Experts recommend mammograms every 2 years starting at age 48 years  You may need a mammogram at age 52 years or younger if you have an increased risk for breast cancer  Talk to your healthcare provider about when you should start having mammograms and how often you need them  Vaccines you may need:   · Get an influenza vaccine  every year  The influenza vaccine protects you from the flu  Several types of viruses cause the flu  The viruses change over time, so new vaccines are made each year  · Get a tetanus-diphtheria (Td) booster vaccine  every 10 years  This vaccine protects you against tetanus and diphtheria  Tetanus is a severe infection that may cause painful muscle spasms and lockjaw  Diphtheria is a severe bacterial infection that causes a thick covering in the back of your mouth and throat  · Get a human papillomavirus (HPV) vaccine  if you are female and aged 23 to 32 or male 23 to 24 and never received it  This vaccine protects you from HPV infection  HPV is the most common infection spread by sexual contact  HPV may also cause vaginal, penile, and anal cancers      · Get a pneumococcal vaccine  if you are aged 72 years or older  The pneumococcal vaccine is an injection given to protect you from pneumococcal disease  Pneumococcal disease is an infection caused by pneumococcal bacteria  The infection may cause pneumonia, meningitis, or an ear infection  · Get a shingles vaccine  if you are aged 61 or older, even if you have had shingles before  The shingles vaccine is an injection to protect you from the varicella-zoster virus  This is the same virus that causes chickenpox  Shingles is a painful rash that develops in people who had chickenpox or have been exposed to the virus  How to eat healthy:  My Plate is a model for planning healthy meals  It shows the types and amounts of foods that should go on your plate  Fruits and vegetables make up about half of your plate, and grains and protein make up the other half  A serving of dairy is included on the side of your plate  The amount of calories and serving sizes you need depends on your age, gender, weight, and height  Examples of healthy foods are listed below:  · Eat a variety of vegetables  such as dark green, red, and orange vegetables  You can also include canned vegetables low in sodium (salt) and frozen vegetables without added butter or sauces  · Eat a variety of fresh fruits , canned fruit in 100% juice, frozen fruit, and dried fruit  · Include whole grains  At least half of the grains you eat should be whole grains  Examples include whole-wheat bread, wheat pasta, brown rice, and whole-grain cereals such as oatmeal     · Eat a variety of protein foods such as seafood (fish and shellfish), lean meat, and poultry without skin (turkey and chicken)  Examples of lean meats include pork leg, shoulder, or tenderloin, and beef round, sirloin, tenderloin, and extra lean ground beef  Other protein foods include eggs and egg substitutes, beans, peas, soy products, nuts, and seeds       · Choose low-fat dairy products such as skim or 1% milk or low-fat yogurt, cheese, and cottage cheese  · Limit unhealthy fats  such as butter, hard margarine, and shortening  Exercise:  Exercise at least 30 minutes per day on most days of the week  Some examples of exercise include walking, biking, dancing, and swimming  You can also fit in more physical activity by taking the stairs instead of the elevator or parking farther away from stores  Include muscle strengthening activities 2 days each week  Regular exercise provides many health benefits  It helps you manage your weight, and decreases your risk for type 2 diabetes, heart disease, stroke, and high blood pressure  Exercise can also help improve your mood  Ask your healthcare provider about the best exercise plan for you  General health and safety guidelines:   · Do not smoke  Nicotine and other chemicals in cigarettes and cigars can cause lung damage  Ask your healthcare provider for information if you currently smoke and need help to quit  E-cigarettes or smokeless tobacco still contain nicotine  Talk to your healthcare provider before you use these products  · Limit alcohol  A drink of alcohol is 12 ounces of beer, 5 ounces of wine, or 1½ ounces of liquor  · Lose weight, if needed  Being overweight increases your risk of certain health conditions  These include heart disease, high blood pressure, type 2 diabetes, and certain types of cancer  · Protect your skin  Do not sunbathe or use tanning beds  Use sunscreen with a SPF 15 or higher  Apply sunscreen at least 15 minutes before you go outside  Reapply sunscreen every 2 hours  Wear protective clothing, hats, and sunglasses when you are outside  · Drive safely  Always wear your seatbelt  Make sure everyone in your car wears a seatbelt  A seatbelt can save your life if you are in an accident  Do not use your cell phone when you are driving  This could distract you and cause an accident   Pull over if you need to make a call or send a text message  · Practice safe sex  Use latex condoms if are sexually active and have more than one partner  Your healthcare provider may recommend screening tests for sexually transmitted infections (STIs)  · Wear helmets, lifejackets, and protective gear  Always wear a helmet when you ride a bike or motorcycle, go skiing, or play sports that could cause a head injury  Wear protective equipment when you play sports  Wear a lifejacket when you are on a boat or doing water sports  © 2017 [facility] Information is for End User's use only and may not be sold, redistributed or otherwise used for commercial purposes  All illustrations and images included in CareNotes® are the copyrighted property of [employer]  or [employer]  The above information is an  only  It is not intended as medical advice for individual conditions or treatments  Talk to your doctor, nurse or pharmacist before following any medical regimen to see if it is safe and effective for you

## 2020-01-06 NOTE — PROGRESS NOTES
ADULT ANNUAL [facility]    NAME: Lesli Solis  AGE: 60 y o  SEX: female  : 1955     DATE: 2020     Assessment and Plan:     Problem List Items Addressed This Visit        Endocrine    Acquired hypothyroidism    Relevant Orders    TSH, 3rd generation with Free T4 reflex       Musculoskeletal and Integument    Osteopenia of left hip       Continue with weight-bearing exercise  We are going to replace her vitamin-D and and she will continue with vitamin-D 2000 International Units daily and calcium 1000 mg daily  Other    Headache syndrome     This is currently not bothering her routinely         Hypercholesterolemia    Relevant Orders    Comprehensive metabolic panel    Vitamin D deficiency      I am going to supplement her vitamin D with 88777 units weekly times 12 doses  She should then start taking calcium plus D  She should shoot for 1000 mg of calcium and 2000 International Units of vitamin D3 daily  Relevant Medications    ergocalciferol (ERGOCALCIFEROL) 1 25 MG (89496 UT) capsule    Chronic neck pain       She would like to hold off on any further physical therapy at this time  But this may be helpful  I will also see how it goes when we replaced her vitamin-D  Hyperglycemia      A1c is okay  Continue to monitor this routinely  Relevant Orders    Hemoglobin A1C      Other Visit Diagnoses     Annual physical exam    -  Primary    Overweight with body mass index (BMI) 25 0-29 9        Colon cancer screening        Relevant Orders    Ambulatory referral to Gastroenterology          Immunizations and preventive care screenings were discussed with patient today  Appropriate education was printed on patient's after visit summary  Counseling:  Alcohol/drug use: discussed moderation in alcohol intake, the recommendations for healthy alcohol use, and avoidance of illicit drug use    Dental Health: discussed importance of regular tooth brushing, flossing, and dental visits  · Exercise: the importance of regular exercise/physical activity was discussed  Recommend exercise 3-5 times per week for at least 30 minutes  BMI Counseling: Body mass index is 25 79 kg/m²  The BMI is above normal  Nutrition recommendations include encouraging healthy choices of fruits and vegetables  Exercise recommendations include moderate physical activity 150 minutes/week  No follow-ups on file  Chief Complaint:     Chief Complaint   Patient presents with    Annual Exam      History of Present Illness:     Adult Annual Physical   Patient here for a comprehensive physical exam  The patient reports That she is doing pretty well overall  She has been retired now for a year and finds this very satisfying  She has less stress  She notes that much less in the way of headaches  She most recently saw gyn and has a uterine polyp as well as some atrophic vaginitis  She will have  Uterine polyp removed in the near future  She also will be having a mammogram done for a palpable abnormality on her breast as well as normal breast cancer screening  She has history of hypothyroidism denies excessive fatigue, constipation or dry skin at this time  She does continue to have some chronic neck pain and diffuse achiness  Fortunately, workup was unrevealing for lupus or inflammatory disease process  She admits to not perform her stretches routinely  She was noted recently to have a very low vitamin-D at 15  Diet and Physical Activity  · Diet/Nutrition: well balanced diet  · Exercise: moderate cardiovascular exercise        Depression Screening  PHQ-9 Depression Screening    PHQ-9:    Frequency of the following problems over the past two weeks:       Little interest or pleasure in doing things:  0 - not at all  Feeling down, depressed, or hopeless:  0 - not at all  PHQ-2 Score:  0       General Health  · Sleep: sleeps well  Restless on occasion  · Hearing: normal - bilateral   · Vision: goes for regular eye exams  · Dental: regular dental visits  /GYN Health  · Patient is: postmenopausal  · Seeing gyn routinely     Review of Systems:     Review of Systems   Constitutional: Negative for appetite change, chills, fatigue, fever and unexpected weight change  HENT: Negative for trouble swallowing  Eyes: Negative for visual disturbance  Respiratory: Negative for cough, chest tightness, shortness of breath and wheezing  Cardiovascular: Negative for chest pain  Gastrointestinal: Negative for abdominal distention, abdominal pain, blood in stool, constipation and diarrhea  Endocrine: Negative for polyuria  Genitourinary: Positive for vaginal discharge  Negative for decreased urine volume, difficulty urinating, flank pain, menstrual problem, pelvic pain and urgency  Musculoskeletal: Negative for arthralgias and myalgias  Skin: Negative for rash  Neurological: Negative for dizziness and light-headedness  Hematological: Negative for adenopathy  Does not bruise/bleed easily  Psychiatric/Behavioral: Negative for sleep disturbance        Past Medical History:     Past Medical History:   Diagnosis Date    Anxiety     last assessed 12/1/14, resolved 2/1/16    Hematuria     last assessed 4/23/15, resolved 2/1/16    Skin neoplasm     last assessed 9/18/15, resolved 2/1/16      Past Surgical History:     Past Surgical History:   Procedure Laterality Date    SHOULDER SURGERY Left 2009      Social History:     Social History     Socioeconomic History    Marital status:      Spouse name: None    Number of children: None    Years of education: None    Highest education level: None   Occupational History    None   Social Needs    Financial resource strain: None    Food insecurity:     Worry: None     Inability: None    Transportation needs:     Medical: None     Non-medical: None   Tobacco Use  Smoking status: Former Smoker     Last attempt to quit: 1990     Years since quittin 0    Smokeless tobacco: Never Used   Substance and Sexual Activity    Alcohol use: Yes     Alcohol/week: 5 0 standard drinks     Types: 5 Glasses of wine per week     Comment: occasionally / social     Drug use: No    Sexual activity: Yes     Partners: Male   Lifestyle    Physical activity:     Days per week: None     Minutes per session: None    Stress: None   Relationships    Social connections:     Talks on phone: None     Gets together: None     Attends Anabaptism service: None     Active member of club or organization: None     Attends meetings of clubs or organizations: None     Relationship status: None    Intimate partner violence:     Fear of current or ex partner: None     Emotionally abused: None     Physically abused: None     Forced sexual activity: None   Other Topics Concern    None   Social History Narrative    None      Family History:     Family History   Problem Relation Age of Onset    Hyperlipidemia Mother     Cancer Father         of spine    Stroke Father         syndrome     No Known Problems Sister     Breast cancer Maternal Aunt 72    No Known Problems Maternal Aunt       Current Medications:     Current Outpatient Medications   Medication Sig Dispense Refill    Cholecalciferol (VITAMIN D3) 1000 units CAPS Take 2 capsules by mouth daily      levothyroxine 75 mcg tablet TAKE 1 TABLET BY MOUTH EVERY DAY 30 tablet 11    ergocalciferol (ERGOCALCIFEROL) 1 25 MG (74692 UT) capsule Take 1 capsule (50,000 Units total) by mouth once a week 12 capsule 0     No current facility-administered medications for this visit  Allergies:      Allergies   Allergen Reactions    Albuterol      Other reaction(s): SULFA (SULFONAMIDES) (Rash)    Sulfa Antibiotics Rash      Physical Exam:     /60 (BP Location: Left arm, Patient Position: Sitting, Cuff Size: Standard)   Pulse 75   Temp 98 °F (36 7 °C) (Tympanic)   Ht 5' 2" (1 575 m)   Wt 64 kg (141 lb)   SpO2 99%   BMI 25 79 kg/m²     Physical Exam   Constitutional: She is oriented to person, place, and time  She appears well-developed and well-nourished  No distress  HENT:   Head: Normocephalic  Eyes: Pupils are equal, round, and reactive to light  Right eye exhibits no discharge  Left eye exhibits no discharge  Neck: No tracheal deviation present  No thyromegaly present  Cardiovascular: Normal rate, regular rhythm and normal heart sounds  No murmur heard  Pulmonary/Chest: Effort normal  No respiratory distress  She has no wheezes  She has no rales  Abdominal: Soft  She exhibits no distension  There is no tenderness  Musculoskeletal: Normal range of motion  She exhibits no edema  Lymphadenopathy:     She has no cervical adenopathy  Neurological: She is alert and oriented to person, place, and time  No cranial nerve deficit  Skin: Skin is warm  She is not diaphoretic  No erythema  Psychiatric: She has a normal mood and affect   Judgment and thought content normal        MD [id number]

## 2020-03-05 ENCOUNTER — CONSULT (OUTPATIENT)
Dept: FAMILY MEDICINE CLINIC | Facility: CLINIC | Age: 65
End: 2020-03-05
Payer: COMMERCIAL

## 2020-03-05 VITALS
BODY MASS INDEX: 26.79 KG/M2 | DIASTOLIC BLOOD PRESSURE: 62 MMHG | RESPIRATION RATE: 18 BRPM | WEIGHT: 145.6 LBS | HEART RATE: 84 BPM | SYSTOLIC BLOOD PRESSURE: 118 MMHG | TEMPERATURE: 98 F | OXYGEN SATURATION: 97 % | HEIGHT: 62 IN

## 2020-03-05 DIAGNOSIS — N95.0 POSTMENOPAUSAL BLEEDING: ICD-10-CM

## 2020-03-05 DIAGNOSIS — E66.3 OVERWEIGHT (BMI 25.0-29.9): ICD-10-CM

## 2020-03-05 DIAGNOSIS — Z01.818 PREOPERATIVE CLEARANCE: Primary | ICD-10-CM

## 2020-03-05 DIAGNOSIS — E03.9 ACQUIRED HYPOTHYROIDISM: ICD-10-CM

## 2020-03-05 PROBLEM — N63.20 LEFT BREAST MASS: Status: RESOLVED | Noted: 2019-12-18 | Resolved: 2020-03-05

## 2020-03-05 PROCEDURE — 99214 OFFICE O/P EST MOD 30 MIN: CPT | Performed by: INTERNAL MEDICINE

## 2020-03-05 PROCEDURE — 1036F TOBACCO NON-USER: CPT | Performed by: INTERNAL MEDICINE

## 2020-03-05 PROCEDURE — 3008F BODY MASS INDEX DOCD: CPT | Performed by: INTERNAL MEDICINE

## 2020-03-05 RX ORDER — FLUTICASONE PROPIONATE 50 MCG
1 SPRAY, SUSPENSION (ML) NASAL
COMMUNITY
Start: 2015-10-06

## 2020-03-05 RX ORDER — IBUPROFEN 200 MG
200 TABLET ORAL EVERY 6 HOURS PRN
COMMUNITY

## 2020-03-05 NOTE — PROGRESS NOTES
Assessment/Plan:     1  Preoperative clearance  -No further cardiac work up recommended for proposed surgery  She is cleared from my standpoint  2  Postmenopausal bleeding    3  Acquired hypothyroidism  Assessment & Plan:  Remains on levothyroxine without issue  4  Overweight (BMI 25 0-29  9)  Assessment & Plan:  Would like to continue to work on her weight with diet and exercise  BMI Counseling: Body mass index is 26 63 kg/m²  The BMI is above normal  Nutrition recommendations include 3-5 servings of fruits/vegetables daily  Exercise recommendations include exercising 3-5 times per week  Subjective:      Patient ID: Aga Gibson is a 60 y o  female  Dick Hampton is here today for a preoperative clearance  She is going to undergo a hysteroscopy/D&C/polyp removal with her gynecologist tomorrow  This is secondary to post menopausal bleeding/spotting  She is feeling well overall and is largely without compalints  The following portions of the patient's history were reviewed and updated as appropriate: She  has a past medical history of Anxiety, Hematuria, and Skin neoplasm  She   Patient Active Problem List    Diagnosis Date Noted    Overweight (BMI 25 0-29 9) 03/05/2020    Hyperglycemia 01/06/2020    Pelvic floor dysfunction 12/18/2019    Atrophic vaginitis 12/18/2019    Recurrent UTI 07/08/2019    Osteopenia of left hip 07/08/2019    Hip pain 06/12/2018    Chronic neck pain 05/29/2018    Headache syndrome 05/12/2017    Chronic constipation 02/01/2016    Hypercholesterolemia 08/28/2013    Acquired hypothyroidism 08/23/2013    Vitamin D deficiency 08/23/2013     She  has a past surgical history that includes Shoulder surgery (Left, 2009) and Tooth extraction  Her family history includes Breast cancer (age of onset: 72) in her maternal aunt; Cancer in her father; Hyperlipidemia in her mother; No Known Problems in her maternal aunt and sister; Stroke in her father    She reports that she quit smoking about 30 years ago  She has never used smokeless tobacco  She reports that she drinks about 5 0 standard drinks of alcohol per week  She reports that she does not use drugs  Current Outpatient Medications   Medication Sig Dispense Refill    ergocalciferol (ERGOCALCIFEROL) 1 25 MG (36424 UT) capsule Take 1 capsule (50,000 Units total) by mouth once a week 12 capsule 0    fluticasone (FLONASE) 50 mcg/act nasal spray 1 spray into each nostril      levothyroxine 75 mcg tablet TAKE 1 TABLET BY MOUTH EVERY DAY 30 tablet 11    Cholecalciferol (VITAMIN D3) 1000 units CAPS Take 2 capsules by mouth daily      ibuprofen (MOTRIN) 200 mg tablet Take 200 mg by mouth every 6 (six) hours as needed       No current facility-administered medications for this visit  She is allergic to sulfa antibiotics       Review of Systems   Constitutional: Negative for chills, fever and unexpected weight change  HENT: Negative for sinus pressure, sinus pain and sore throat  Respiratory: Negative for cough, chest tightness, shortness of breath and wheezing  Cardiovascular: Negative for chest pain, palpitations and leg swelling  Gastrointestinal: Positive for constipation  Negative for abdominal pain, blood in stool, diarrhea, nausea and vomiting  Genitourinary: Positive for vaginal bleeding  Negative for difficulty urinating and dysuria  Musculoskeletal: Positive for arthralgias and myalgias  Negative for back pain  Neurological: Negative for dizziness, syncope, numbness and headaches  Psychiatric/Behavioral: Negative for dysphoric mood and sleep disturbance  Objective:      /62   Pulse 84   Temp 98 °F (36 7 °C)   Resp 18   Ht 5' 2" (1 575 m)   Wt 66 kg (145 lb 9 6 oz)   SpO2 97%   BMI 26 63 kg/m²          Physical Exam   Constitutional: She is oriented to person, place, and time  She appears well-developed and well-nourished  No distress     HENT:   Head: Normocephalic and atraumatic  Right Ear: External ear normal    Left Ear: External ear normal    Mouth/Throat: Oropharynx is clear and moist    Eyes: Pupils are equal, round, and reactive to light  Conjunctivae and EOM are normal  Right eye exhibits no discharge  Left eye exhibits no discharge  Neck: Normal range of motion  Neck supple  No tracheal deviation present  No thyromegaly present  Cardiovascular: Normal rate, regular rhythm and normal heart sounds  Pulmonary/Chest: Effort normal and breath sounds normal  No respiratory distress  She has no wheezes  Abdominal: Soft  Bowel sounds are normal  There is no tenderness  Musculoskeletal: Normal range of motion  She exhibits no edema  Lymphadenopathy:     She has no cervical adenopathy  Neurological: She is alert and oriented to person, place, and time  No cranial nerve deficit  Skin: Skin is warm and dry  She is not diaphoretic  Psychiatric: She has a normal mood and affect  Her speech is normal and behavior is normal  Judgment and thought content normal    Vitals reviewed

## 2020-03-05 NOTE — ASSESSMENT & PLAN NOTE
Would like to continue to work on her weight with diet and exercise  BMI Counseling: Body mass index is 26 63 kg/m²  The BMI is above normal  Nutrition recommendations include 3-5 servings of fruits/vegetables daily  Exercise recommendations include exercising 3-5 times per week

## 2020-03-20 DIAGNOSIS — E55.9 VITAMIN D DEFICIENCY: ICD-10-CM

## 2020-03-20 RX ORDER — ERGOCALCIFEROL 1.25 MG/1
CAPSULE ORAL
Qty: 4 CAPSULE | Refills: 2 | OUTPATIENT
Start: 2020-03-20

## 2020-05-04 ENCOUNTER — TELEPHONE (OUTPATIENT)
Dept: FAMILY MEDICINE CLINIC | Facility: CLINIC | Age: 65
End: 2020-05-04

## 2020-08-24 ENCOUNTER — APPOINTMENT (OUTPATIENT)
Dept: LAB | Facility: CLINIC | Age: 65
End: 2020-08-24
Payer: COMMERCIAL

## 2020-08-24 DIAGNOSIS — E78.00 HYPERCHOLESTEROLEMIA: ICD-10-CM

## 2020-08-24 DIAGNOSIS — R73.9 HYPERGLYCEMIA: ICD-10-CM

## 2020-08-24 DIAGNOSIS — E03.9 ACQUIRED HYPOTHYROIDISM: ICD-10-CM

## 2020-08-24 LAB
ALBUMIN SERPL BCP-MCNC: 3.6 G/DL (ref 3.5–5)
ALP SERPL-CCNC: 61 U/L (ref 46–116)
ALT SERPL W P-5'-P-CCNC: 26 U/L (ref 12–78)
ANION GAP SERPL CALCULATED.3IONS-SCNC: 5 MMOL/L (ref 4–13)
AST SERPL W P-5'-P-CCNC: 18 U/L (ref 5–45)
BILIRUB SERPL-MCNC: 0.45 MG/DL (ref 0.2–1)
BUN SERPL-MCNC: 11 MG/DL (ref 5–25)
CALCIUM SERPL-MCNC: 9 MG/DL (ref 8.3–10.1)
CHLORIDE SERPL-SCNC: 108 MMOL/L (ref 100–108)
CO2 SERPL-SCNC: 28 MMOL/L (ref 21–32)
CREAT SERPL-MCNC: 0.53 MG/DL (ref 0.6–1.3)
EST. AVERAGE GLUCOSE BLD GHB EST-MCNC: 108 MG/DL
GFR SERPL CREATININE-BSD FRML MDRD: 102 ML/MIN/1.73SQ M
GLUCOSE P FAST SERPL-MCNC: 97 MG/DL (ref 65–99)
HBA1C MFR BLD: 5.4 %
POTASSIUM SERPL-SCNC: 4.2 MMOL/L (ref 3.5–5.3)
PROT SERPL-MCNC: 6.8 G/DL (ref 6.4–8.2)
SODIUM SERPL-SCNC: 141 MMOL/L (ref 136–145)
TSH SERPL DL<=0.05 MIU/L-ACNC: 1.63 UIU/ML (ref 0.36–3.74)

## 2020-08-24 PROCEDURE — 80053 COMPREHEN METABOLIC PANEL: CPT

## 2020-08-24 PROCEDURE — 84443 ASSAY THYROID STIM HORMONE: CPT

## 2020-08-24 PROCEDURE — 83036 HEMOGLOBIN GLYCOSYLATED A1C: CPT

## 2020-08-24 PROCEDURE — 36415 COLL VENOUS BLD VENIPUNCTURE: CPT

## 2020-09-09 ENCOUNTER — PATIENT MESSAGE (OUTPATIENT)
Dept: FAMILY MEDICINE CLINIC | Facility: CLINIC | Age: 65
End: 2020-09-09

## 2020-09-09 ENCOUNTER — APPOINTMENT (OUTPATIENT)
Dept: RADIOLOGY | Facility: MEDICAL CENTER | Age: 65
End: 2020-09-09
Payer: COMMERCIAL

## 2020-09-09 ENCOUNTER — OFFICE VISIT (OUTPATIENT)
Dept: FAMILY MEDICINE CLINIC | Facility: CLINIC | Age: 65
End: 2020-09-09
Payer: COMMERCIAL

## 2020-09-09 VITALS
TEMPERATURE: 98 F | SYSTOLIC BLOOD PRESSURE: 112 MMHG | HEIGHT: 62 IN | BODY MASS INDEX: 26.68 KG/M2 | OXYGEN SATURATION: 98 % | HEART RATE: 61 BPM | WEIGHT: 145 LBS | DIASTOLIC BLOOD PRESSURE: 70 MMHG

## 2020-09-09 DIAGNOSIS — E55.9 VITAMIN D DEFICIENCY: Primary | ICD-10-CM

## 2020-09-09 DIAGNOSIS — E78.00 HYPERCHOLESTEROLEMIA: ICD-10-CM

## 2020-09-09 DIAGNOSIS — M79.671 RIGHT FOOT PAIN: Primary | ICD-10-CM

## 2020-09-09 DIAGNOSIS — M79.671 RIGHT FOOT PAIN: ICD-10-CM

## 2020-09-09 PROCEDURE — 99213 OFFICE O/P EST LOW 20 MIN: CPT | Performed by: INTERNAL MEDICINE

## 2020-09-09 PROCEDURE — 73630 X-RAY EXAM OF FOOT: CPT

## 2020-09-09 NOTE — PROGRESS NOTES
Assessment/Plan:     1  Right foot pain  -     XR foot 3+ vw right; Future; Expected date: 09/09/2020      Given the persistent swelling and pain with wearing the shoe, will send for an image to evaluate for underlying fracture  Will advise once obtained  Subjective:      Patient ID: Víctor Decker is a 63 y o  female  Sunshine Holguin is here today complaints of toe pain on the right foot  Reports 2 weeks ago, she had injury where her foot was stuck under a table and seemed to drag  She noted bruising under the toe that improved but the 5th digit remains somewhat swollen/painful  She is able to bear weight but feels pain with wearing a shoe  Toe Pain    The incident occurred more than 1 week ago  The incident occurred at home  The injury mechanism was a compression  The pain is present in the right toes  The pain is mild  The pain has been fluctuating since onset  Pertinent negatives include no inability to bear weight, loss of motion, loss of sensation, muscle weakness, numbness or tingling  She has tried ice for the symptoms         The following portions of the patient's history were reviewed and updated as appropriate: allergies, past family history, past medical history, past social history, past surgical history and problem list     Current Outpatient Medications:     Cholecalciferol (VITAMIN D3) 1000 units CAPS, Take 2 capsules by mouth daily, Disp: , Rfl:     ibuprofen (MOTRIN) 200 mg tablet, Take 200 mg by mouth every 6 (six) hours as needed, Disp: , Rfl:     levothyroxine 75 mcg tablet, TAKE 1 TABLET BY MOUTH EVERY DAY, Disp: 30 tablet, Rfl: 11    ergocalciferol (ERGOCALCIFEROL) 1 25 MG (09575 UT) capsule, Take 1 capsule (50,000 Units total) by mouth once a week (Patient not taking: Reported on 9/9/2020), Disp: 12 capsule, Rfl: 0    fluticasone (FLONASE) 50 mcg/act nasal spray, 1 spray into each nostril, Disp: , Rfl:     Review of Systems   Musculoskeletal: Positive for arthralgias and joint swelling  Neurological: Negative for tingling and numbness  Objective:      /70 (BP Location: Left arm, Patient Position: Sitting, Cuff Size: Standard)   Pulse 61   Temp 98 °F (36 7 °C)   Ht 5' 2" (1 575 m)   Wt 65 8 kg (145 lb)   SpO2 98%   BMI 26 52 kg/m²          Physical Exam  Constitutional:       Appearance: Normal appearance  HENT:      Head: Normocephalic  Eyes:      Conjunctiva/sclera: Conjunctivae normal    Cardiovascular:      Rate and Rhythm: Normal rate  Pulmonary:      Effort: Pulmonary effort is normal  No respiratory distress  Breath sounds: No stridor  Musculoskeletal:      Comments: 5th digit of right foot appears somewhat deformed with swelling; notes some discomfort with lateral pressure    Neurological:      General: No focal deficit present  Mental Status: She is alert  She is disoriented  Psychiatric:         Mood and Affect: Mood normal          Behavior: Behavior normal          Thought Content:  Thought content normal          Judgment: Judgment normal

## 2020-09-10 DIAGNOSIS — S92.901A CLOSED FRACTURE OF RIGHT FOOT, INITIAL ENCOUNTER: Primary | ICD-10-CM

## 2020-09-11 ENCOUNTER — OFFICE VISIT (OUTPATIENT)
Dept: OBGYN CLINIC | Facility: CLINIC | Age: 65
End: 2020-09-11
Payer: COMMERCIAL

## 2020-09-11 VITALS
WEIGHT: 145.6 LBS | BODY MASS INDEX: 26.79 KG/M2 | HEIGHT: 62 IN | HEART RATE: 66 BPM | SYSTOLIC BLOOD PRESSURE: 121 MMHG | DIASTOLIC BLOOD PRESSURE: 75 MMHG

## 2020-09-11 DIAGNOSIS — S92.511A CLOSED DISPLACED FRACTURE OF PROXIMAL PHALANX OF LESSER TOE OF RIGHT FOOT, INITIAL ENCOUNTER: Primary | ICD-10-CM

## 2020-09-11 PROCEDURE — 99243 OFF/OP CNSLTJ NEW/EST LOW 30: CPT | Performed by: ORTHOPAEDIC SURGERY

## 2020-09-11 NOTE — PATIENT INSTRUCTIONS
Weightbearing as tolerated in post-op boot  Do not need to wear the boot for sleep or showering but should wear it any time you are walking on it until youre comfortable getting into a supportive sneaker  Sometimes, cutting a hole in an old pair of sneakers works best to get out of the boot sooner       Recommend taking the following supplements: Vitamin D 4000 units per day and Calcium 1200 mg per day  This will help with bone healing  You can do this for 8 weeks    Avoid NSAIDs like Motrin/Aleve/Ibuprofen  Avoid steroids/nicotine products/ rheumatoid medications like DMARDs if possible  Knee high compression stocking 20/30mm HG of pressure if swelling is an issue

## 2020-09-11 NOTE — PROGRESS NOTES
SHERIDAN Lang  Attending, Orthopaedic Surgery  [facility] [address] [facility]        ORTHOPAEDIC FOOT AND ANKLE CLINIC VISIT     Assessment:     Encounter Diagnosis   Name Primary?  Closed displaced fracture of proximal phalanx of lesser toe of right foot, initial encounter Yes          Plan:   · The patient verbalized understanding of exam findings and treatment plan  We engaged in the shared decision-making process and treatment options were discussed at length with the patient  Surgical and conservative management discussed today along with risks and benefits  · She has a displaced fracture of the right 5th toe proximal phalanx  This is in a clinically acceptable position for nonoperative management  · Post-op shoe for comfort  She may wean the post-op shoe and get into a supportive sneaker when ready but no later than 6 weeks from now  · Vit D and calcium  · Elevation and ice  · Return if symptoms worsen or fail to improve  History of Present Illness:   Chief Complaint:   Chief Complaint   Patient presents with    Right Foot - Pain     Derik Lord is a 63 y o  female who is being seen for right 5th toe fracture  She got the toe caught on a table two days ago  Pain is localized at fracture site with minimal radiating and described as sharp and severe  Patient denies numbness, tingling or radicular pain  Denies history of neuropathy  Patient does not smoke, does not have diabetes and does not take blood thinners  Patient denies family history of anesthesia complications and has not had any complications with anesthesia       Pain/symptom timing:  Worse during the day when active  Pain/symptom context:  Worse with activites and work  Pain/symptom modifying factors:  Rest makes better, activities make worse  Pain/symptom associated signs/symptoms: none    Prior treatment   · NSAIDsYes    · Injections No   · Bracing/Orthotics No   · Physical Therapy No     Orthopedic Surgical History:   See below    Past Medical, Surgical and Social History:  Past Medical History:  has a past medical history of Anxiety, Hematuria, and Skin neoplasm  Problem List: does not have any pertinent problems on file  Past Surgical History:  has a past surgical history that includes Shoulder surgery (Left, 2009) and Tooth extraction  Family History: family history includes Breast cancer (age of onset: 72) in her maternal aunt; Cancer in her father; Hyperlipidemia in her mother; No Known Problems in her maternal aunt and sister; Stroke in her father  Social History:  reports that she quit smoking about 30 years ago  She has never used smokeless tobacco  She reports current alcohol use of about 5 0 standard drinks of alcohol per week  She reports that she does not use drugs  Current Medications: has a current medication list which includes the following prescription(s): vitamin d3, ergocalciferol, fluticasone, ibuprofen, and levothyroxine  Allergies: is allergic to sulfa antibiotics  Review of Systems:  General- denies fever/chills  HEENT- denies hearing loss or sore throat  Eyes- denies eye pain or visual disturbances, denies red eyes  Respiratory- denies cough or SOB  Cardio- denies chest pain or palpitations  GI- denies abdominal pain  Endocrine- denies urinary frequency  Urinary- denies pain with urination  Musculoskeletal- Negative except noted above  Skin- denies rashes or wounds  Neurological- denies dizziness or headache  Psychiatric- denies anxiety or difficulty concentrating    Physical Exam:   /75 (BP Location: Left arm, Patient Position: Sitting, Cuff Size: Adult)   Pulse 66   Ht 5' 2" (1 575 m)   Wt 66 kg (145 lb 9 6 oz)   BMI 26 63 kg/m²   General/Constitutional: No apparent distress: well-nourished and well developed    Eyes: normal ocular motion  Cardio: RRR, Normal S1S2, No m/r/g  Lymphatic: No appreciable lymphadenopathy  Respiratory: Non-labored breathing, CTA b/l no w/c/r  Vascular: No edema, swelling or tenderness, except as noted in detailed exam   Integumentary: No impressive skin lesions present, except as noted in detailed exam   Neuro: No ataxia or tremors noted  Psych: Normal mood and affect, oriented to person, place and time  Appropriate affect  Musculoskeletal: Normal, except as noted in detailed exam and in HPI  Examination    Right    Gait Normal   Musculoskeletal Tender to palpation at fracture site    Skin Normal   +swelling at 5th toe    Nails Normal    Range of Motion  20 degrees dorsiflexion, 30 degrees plantarflexion  Subtalar motion: normal    Stability Stable    Muscle Strength 5/5 tibialis anterior  5/5 gastrocnemius-soleus  5/5 posterior tibialis  5/5 peroneal/eversion strength  5/5 EHL  5/5 FHL    Neurologic Normal    Sensation  Intact to light touch throughout sural, saphenous, superficial peroneal, deep peroneal and medial/lateral plantar nerve distributions  Virgie-Sae 5 07 filament (10g) testing  deferred  Cardiovascular Brisk capillary refill < 2 seconds,intact DP and PT pulses    Special Tests None      Imaging Studies:   3 views of the right foot were taken, reviewed and interpreted independently that demonstrate proximal phalanx fracture which has some angulation and shortening  Reviewed by me personally  Billi Lane Lachman, MD  Foot & Ankle Surgery   Department of [address] [place] [address]      I personally performed the service  Billi Lane Lachman, MD

## 2020-09-28 ENCOUNTER — APPOINTMENT (OUTPATIENT)
Dept: LAB | Facility: CLINIC | Age: 65
End: 2020-09-28
Payer: COMMERCIAL

## 2020-09-28 DIAGNOSIS — E55.9 VITAMIN D DEFICIENCY: ICD-10-CM

## 2020-09-28 DIAGNOSIS — E78.00 HYPERCHOLESTEROLEMIA: ICD-10-CM

## 2020-09-28 LAB
25(OH)D3 SERPL-MCNC: 28 NG/ML (ref 30–100)
CHOLEST SERPL-MCNC: 224 MG/DL (ref 50–200)
HDLC SERPL-MCNC: 51 MG/DL
LDLC SERPL CALC-MCNC: 138 MG/DL (ref 0–100)
TRIGL SERPL-MCNC: 177 MG/DL

## 2020-09-28 PROCEDURE — 36415 COLL VENOUS BLD VENIPUNCTURE: CPT

## 2020-09-28 PROCEDURE — 82306 VITAMIN D 25 HYDROXY: CPT

## 2020-09-28 PROCEDURE — 80061 LIPID PANEL: CPT

## 2020-09-30 ENCOUNTER — OFFICE VISIT (OUTPATIENT)
Dept: FAMILY MEDICINE CLINIC | Facility: CLINIC | Age: 65
End: 2020-09-30
Payer: COMMERCIAL

## 2020-09-30 VITALS
WEIGHT: 145 LBS | OXYGEN SATURATION: 99 % | HEIGHT: 62 IN | BODY MASS INDEX: 26.68 KG/M2 | DIASTOLIC BLOOD PRESSURE: 70 MMHG | TEMPERATURE: 98.2 F | HEART RATE: 61 BPM | SYSTOLIC BLOOD PRESSURE: 130 MMHG

## 2020-09-30 DIAGNOSIS — K59.09 CHRONIC CONSTIPATION: ICD-10-CM

## 2020-09-30 DIAGNOSIS — R13.19 ESOPHAGEAL DYSPHAGIA: ICD-10-CM

## 2020-09-30 DIAGNOSIS — E66.3 OVERWEIGHT: ICD-10-CM

## 2020-09-30 DIAGNOSIS — E03.9 ACQUIRED HYPOTHYROIDISM: ICD-10-CM

## 2020-09-30 DIAGNOSIS — E78.00 HYPERCHOLESTEROLEMIA: ICD-10-CM

## 2020-09-30 DIAGNOSIS — Z11.4 SCREENING FOR HIV (HUMAN IMMUNODEFICIENCY VIRUS): ICD-10-CM

## 2020-09-30 DIAGNOSIS — E55.9 VITAMIN D DEFICIENCY: ICD-10-CM

## 2020-09-30 DIAGNOSIS — R73.9 HYPERGLYCEMIA: ICD-10-CM

## 2020-09-30 DIAGNOSIS — Z23 NEED FOR INFLUENZA VACCINATION: Primary | ICD-10-CM

## 2020-09-30 PROCEDURE — 1036F TOBACCO NON-USER: CPT | Performed by: FAMILY MEDICINE

## 2020-09-30 PROCEDURE — 90682 RIV4 VACC RECOMBINANT DNA IM: CPT

## 2020-09-30 PROCEDURE — 99214 OFFICE O/P EST MOD 30 MIN: CPT | Performed by: FAMILY MEDICINE

## 2020-09-30 PROCEDURE — 90471 IMMUNIZATION ADMIN: CPT

## 2020-09-30 RX ORDER — ACETAMINOPHEN 160 MG
2000 TABLET,DISINTEGRATING ORAL DAILY
Refills: 0
Start: 2020-09-30

## 2020-09-30 NOTE — PROGRESS NOTES
Assessment/Plan:       Problem List Items Addressed This Visit        Digestive    Chronic constipation    Relevant Orders    Ambulatory referral to Gastroenterology    Esophageal dysphagia     Does have some chronic intermittent dysphagia that bother her at least since 2017  I would like her to have an EGD with her upcoming colonoscopy  I have referred her to GI to set this up          Relevant Orders    Ambulatory referral to Gastroenterology       Endocrine    Acquired hypothyroidism    Relevant Orders    TSH, 3rd generation with Free T4 reflex    CBC and differential       Other    Hypercholesterolemia    Relevant Orders    Comprehensive metabolic panel    Lipid Panel with Direct LDL reflex    CBC and differential    Vitamin D deficiency    Relevant Medications    Cholecalciferol (Vitamin D3) 50 MCG (2000 UT) capsule    Other Relevant Orders    Vitamin D 25 hydroxy    Hyperglycemia    Relevant Orders    Hemoglobin A1C    CBC and differential    Overweight      Other Visit Diagnoses     Need for influenza vaccination    -  Primary    Relevant Orders    influenza vaccine, quadrivalent, recombinant, PF, 0 5 mL, for patients 18 yr+ (FLUBLOK)    Screening for HIV (human immunodeficiency virus)        Relevant Orders    HIV 1/2 Antigen/Antibody (4th Generation) w Reflex [facility]          BMI Counseling: Body mass index is 26 52 kg/m²  The BMI is above normal  Nutrition recommendations include encouraging healthy choices of fruits and vegetables  Exercise recommendations include moderate physical activity 150 minutes/week  Subjective:      Patient ID: Gerhardt Hoover is a 63 y o  female  HPI patient presents today for follow-up for multiple chronic health issues  Overall, she is feeling relatively well  She never did follow-up for her colonoscopy or EGD  She is having some persistent discomfort when swallowing and occasional sensation that food is getting stuck in her lower throat    She has no significant heartburn symptoms at this time  She does have a history of some intermittent chronic constipation which remains relatively stable at this time  She denies abdominal pain or GI bleed  She has history of hypothyroidism  She has some occasional fatigue but denies excessive dry skin  She has a history of hyperlipidemia and is not currently on statin therapy  Her 10 year risk remains relatively low at 5 2%  She has a history of hyperglycemia  She denies significant polyuria or polydipsia  The following portions of the patient's history were reviewed and updated as appropriate: allergies, current medications, past family history, past medical history, past social history, past surgical history and problem list       Current Outpatient Medications:     ibuprofen (MOTRIN) 200 mg tablet, Take 200 mg by mouth every 6 (six) hours as needed, Disp: , Rfl:     Cholecalciferol (Vitamin D3) 50 MCG (2000 UT) capsule, Take 1 capsule (2,000 Units total) by mouth daily, Disp: , Rfl: 0    fluticasone (FLONASE) 50 mcg/act nasal spray, 1 spray into each nostril, Disp: , Rfl:     levothyroxine 75 mcg tablet, TAKE 1 TABLET BY MOUTH EVERY DAY, Disp: 30 tablet, Rfl: 11     Review of Systems   Constitutional: Negative for appetite change, chills, fatigue, fever and unexpected weight change  HENT: Negative for trouble swallowing  Eyes: Negative for visual disturbance  Respiratory: Negative for cough, chest tightness, shortness of breath and wheezing  Cardiovascular: Negative for chest pain, palpitations and leg swelling  Gastrointestinal: Positive for constipation  Negative for abdominal distention, abdominal pain, blood in stool and diarrhea  Endocrine: Negative for polyuria  Genitourinary: Negative for difficulty urinating and flank pain  Musculoskeletal: Negative for arthralgias and myalgias  Skin: Negative for rash  Neurological: Negative for dizziness and light-headedness     Hematological: Negative for adenopathy  Does not bruise/bleed easily  Psychiatric/Behavioral: Negative for sleep disturbance  Objective:      /70 (BP Location: Left arm, Patient Position: Sitting, Cuff Size: Standard)   Pulse 61   Temp 98 2 °F (36 8 °C)   Ht 5' 2" (1 575 m)   Wt 65 8 kg (145 lb)   SpO2 99%   BMI 26 52 kg/m²          Physical Exam  Constitutional:       General: She is not in acute distress  Appearance: She is well-developed  She is not diaphoretic  HENT:      Head: Normocephalic  Eyes:      General:         Right eye: No discharge  Left eye: No discharge  Pupils: Pupils are equal, round, and reactive to light  Neck:      Thyroid: No thyromegaly  Trachea: No tracheal deviation  Cardiovascular:      Rate and Rhythm: Normal rate and regular rhythm  Heart sounds: Normal heart sounds  No murmur  Pulmonary:      Effort: Pulmonary effort is normal  No respiratory distress  Breath sounds: No wheezing or rales  Abdominal:      General: There is no distension  Palpations: Abdomen is soft  Tenderness: There is no abdominal tenderness  Musculoskeletal: Normal range of motion  Lymphadenopathy:      Cervical: No cervical adenopathy  Skin:     General: Skin is warm  Findings: No erythema  Neurological:      Mental Status: She is alert and oriented to person, place, and time  Cranial Nerves: No cranial nerve deficit  Psychiatric:         Thought Content:  Thought content normal          Judgment: Judgment normal            Danielle Conde MD

## 2020-09-30 NOTE — ASSESSMENT & PLAN NOTE
Does have some chronic intermittent dysphagia that bother her at least since 2017  I would like her to have an EGD with her upcoming colonoscopy    I have referred her to GI to set this up

## 2020-10-20 ENCOUNTER — TELEPHONE (OUTPATIENT)
Dept: GASTROENTEROLOGY | Facility: CLINIC | Age: 65
End: 2020-10-20

## 2020-11-11 ENCOUNTER — OFFICE VISIT (OUTPATIENT)
Dept: FAMILY MEDICINE CLINIC | Facility: CLINIC | Age: 65
End: 2020-11-11
Payer: COMMERCIAL

## 2020-11-11 VITALS
OXYGEN SATURATION: 98 % | WEIGHT: 145 LBS | BODY MASS INDEX: 26.68 KG/M2 | TEMPERATURE: 98.8 F | SYSTOLIC BLOOD PRESSURE: 104 MMHG | DIASTOLIC BLOOD PRESSURE: 64 MMHG | HEART RATE: 57 BPM | HEIGHT: 62 IN

## 2020-11-11 DIAGNOSIS — N39.0 RECURRENT UTI: ICD-10-CM

## 2020-11-11 DIAGNOSIS — R30.0 DYSURIA: Primary | ICD-10-CM

## 2020-11-11 LAB
SL AMB  POCT GLUCOSE, UA: ABNORMAL
SL AMB LEUKOCYTE ESTERASE,UA: ABNORMAL
SL AMB POCT BILIRUBIN,UA: ABNORMAL
SL AMB POCT BLOOD,UA: ABNORMAL
SL AMB POCT CLARITY,UA: ABNORMAL
SL AMB POCT COLOR,UA: YELLOW
SL AMB POCT KETONES,UA: ABNORMAL
SL AMB POCT NITRITE,UA: ABNORMAL
SL AMB POCT PH,UA: 7
SL AMB POCT SPECIFIC GRAVITY,UA: 1.02
SL AMB POCT URINE PROTEIN: ABNORMAL
SL AMB POCT UROBILINOGEN: ABNORMAL

## 2020-11-11 PROCEDURE — 81002 URINALYSIS NONAUTO W/O SCOPE: CPT | Performed by: INTERNAL MEDICINE

## 2020-11-11 PROCEDURE — 87086 URINE CULTURE/COLONY COUNT: CPT | Performed by: INTERNAL MEDICINE

## 2020-11-11 PROCEDURE — 99213 OFFICE O/P EST LOW 20 MIN: CPT | Performed by: INTERNAL MEDICINE

## 2020-11-11 PROCEDURE — 1036F TOBACCO NON-USER: CPT | Performed by: INTERNAL MEDICINE

## 2020-11-11 PROCEDURE — 87186 SC STD MICRODIL/AGAR DIL: CPT | Performed by: INTERNAL MEDICINE

## 2020-11-11 PROCEDURE — 3008F BODY MASS INDEX DOCD: CPT | Performed by: INTERNAL MEDICINE

## 2020-11-14 LAB — BACTERIA UR CULT: ABNORMAL

## 2020-12-10 DIAGNOSIS — E03.9 ACQUIRED HYPOTHYROIDISM: ICD-10-CM

## 2020-12-11 RX ORDER — LEVOTHYROXINE SODIUM 0.07 MG/1
TABLET ORAL
Qty: 30 TABLET | Refills: 11 | Status: SHIPPED | OUTPATIENT
Start: 2020-12-11 | End: 2021-06-07 | Stop reason: SDUPTHER

## 2020-12-16 ENCOUNTER — TELEPHONE (OUTPATIENT)
Dept: GASTROENTEROLOGY | Facility: MEDICAL CENTER | Age: 65
End: 2020-12-16

## 2020-12-16 ENCOUNTER — TELEMEDICINE (OUTPATIENT)
Dept: GASTROENTEROLOGY | Facility: MEDICAL CENTER | Age: 65
End: 2020-12-16

## 2020-12-16 DIAGNOSIS — Z12.11 COLON CANCER SCREENING: ICD-10-CM

## 2020-12-16 DIAGNOSIS — R13.19 ESOPHAGEAL DYSPHAGIA: Primary | ICD-10-CM

## 2020-12-16 DIAGNOSIS — K59.09 CHRONIC CONSTIPATION: ICD-10-CM

## 2020-12-16 PROCEDURE — 99203 OFFICE O/P NEW LOW 30 MIN: CPT | Performed by: INTERNAL MEDICINE

## 2020-12-17 ENCOUNTER — PATIENT MESSAGE (OUTPATIENT)
Dept: FAMILY MEDICINE CLINIC | Facility: CLINIC | Age: 65
End: 2020-12-17

## 2020-12-23 ENCOUNTER — TELEPHONE (OUTPATIENT)
Dept: GASTROENTEROLOGY | Facility: MEDICAL CENTER | Age: 65
End: 2020-12-23

## 2021-02-09 ENCOUNTER — TELEPHONE (OUTPATIENT)
Dept: GASTROENTEROLOGY | Facility: MEDICAL CENTER | Age: 66
End: 2021-02-09

## 2021-03-16 ENCOUNTER — TELEPHONE (OUTPATIENT)
Dept: GASTROENTEROLOGY | Facility: MEDICAL CENTER | Age: 66
End: 2021-03-16

## 2021-03-16 NOTE — TELEPHONE ENCOUNTER
Patient called office to reschedule her colonoscopy to a later date  Patient has been moved from 4/7/21 to 5/6/21  Notified Roz Kimball of the change in dates so ASA form could be updated

## 2021-03-31 DIAGNOSIS — Z23 ENCOUNTER FOR IMMUNIZATION: ICD-10-CM

## 2021-05-05 ENCOUNTER — ANESTHESIA EVENT (OUTPATIENT)
Dept: GASTROENTEROLOGY | Facility: MEDICAL CENTER | Age: 66
End: 2021-05-05

## 2021-05-06 ENCOUNTER — ANESTHESIA (OUTPATIENT)
Dept: GASTROENTEROLOGY | Facility: MEDICAL CENTER | Age: 66
End: 2021-05-06

## 2021-05-06 ENCOUNTER — HOSPITAL ENCOUNTER (OUTPATIENT)
Dept: GASTROENTEROLOGY | Facility: MEDICAL CENTER | Age: 66
Setting detail: OUTPATIENT SURGERY
Discharge: HOME | End: 2021-05-06
Admitting: INTERNAL MEDICINE
Payer: COMMERCIAL

## 2021-05-06 VITALS
SYSTOLIC BLOOD PRESSURE: 125 MMHG | HEIGHT: 62 IN | BODY MASS INDEX: 24.84 KG/M2 | HEART RATE: 52 BPM | WEIGHT: 135 LBS | OXYGEN SATURATION: 99 % | RESPIRATION RATE: 20 BRPM | DIASTOLIC BLOOD PRESSURE: 60 MMHG | TEMPERATURE: 97.6 F

## 2021-05-06 DIAGNOSIS — R13.19 ESOPHAGEAL DYSPHAGIA: ICD-10-CM

## 2021-05-06 DIAGNOSIS — Z12.11 COLON CANCER SCREENING: ICD-10-CM

## 2021-05-06 PROCEDURE — 88305 TISSUE EXAM BY PATHOLOGIST: CPT | Performed by: PATHOLOGY

## 2021-05-06 PROCEDURE — 45385 COLONOSCOPY W/LESION REMOVAL: CPT | Performed by: INTERNAL MEDICINE

## 2021-05-06 PROCEDURE — 43239 EGD BIOPSY SINGLE/MULTIPLE: CPT | Performed by: INTERNAL MEDICINE

## 2021-05-06 RX ORDER — SODIUM CHLORIDE 9 MG/ML
125 INJECTION, SOLUTION INTRAVENOUS CONTINUOUS
Status: DISCONTINUED | OUTPATIENT
Start: 2021-05-06 | End: 2021-05-10 | Stop reason: HOSPADM

## 2021-05-06 RX ORDER — ONDANSETRON 2 MG/ML
4 INJECTION INTRAMUSCULAR; INTRAVENOUS EVERY 6 HOURS PRN
Status: DISCONTINUED | OUTPATIENT
Start: 2021-05-06 | End: 2021-05-10 | Stop reason: HOSPADM

## 2021-05-06 RX ORDER — PROPOFOL 10 MG/ML
INJECTION, EMULSION INTRAVENOUS AS NEEDED
Status: DISCONTINUED | OUTPATIENT
Start: 2021-05-06 | End: 2021-05-06

## 2021-05-06 RX ORDER — PROPOFOL 10 MG/ML
INJECTION, EMULSION INTRAVENOUS CONTINUOUS PRN
Status: DISCONTINUED | OUTPATIENT
Start: 2021-05-06 | End: 2021-05-06

## 2021-05-06 RX ORDER — LIDOCAINE HYDROCHLORIDE 20 MG/ML
INJECTION, SOLUTION EPIDURAL; INFILTRATION; INTRACAUDAL; PERINEURAL AS NEEDED
Status: DISCONTINUED | OUTPATIENT
Start: 2021-05-06 | End: 2021-05-06

## 2021-05-06 RX ADMIN — PROPOFOL 40 MG: 10 INJECTION, EMULSION INTRAVENOUS at 13:57

## 2021-05-06 RX ADMIN — PROPOFOL 40 MG: 10 INJECTION, EMULSION INTRAVENOUS at 14:10

## 2021-05-06 RX ADMIN — SODIUM CHLORIDE 125 ML/HR: 0.9 INJECTION, SOLUTION INTRAVENOUS at 13:00

## 2021-05-06 RX ADMIN — LIDOCAINE HYDROCHLORIDE 50 MG: 20 INJECTION, SOLUTION EPIDURAL; INFILTRATION; INTRACAUDAL; PERINEURAL at 13:46

## 2021-05-06 RX ADMIN — PROPOFOL 120 MCG/KG/MIN: 10 INJECTION, EMULSION INTRAVENOUS at 13:47

## 2021-05-06 RX ADMIN — Medication 40 MG: at 14:05

## 2021-05-06 RX ADMIN — PROPOFOL 120 MG: 10 INJECTION, EMULSION INTRAVENOUS at 13:46

## 2021-05-06 NOTE — H&P
H&P EXAM - Outpatient Endoscopy   Gerhardt Hoover 63 y o  female MRN: [id number]    [facility] PRE/PST   Encounter: [id number]        History and Physical - [facility]  Gerhardt Hoover 63 y o  female MRN: [id number]                  HPI: Gerhardt Hoover is a 63y o  year old female who presents for dysphagia, colon cancer screening      REVIEW OF SYSTEMS: Per the HPI, and otherwise unremarkable      Historical Information   Past Medical History:   Diagnosis Date    Anxiety     last assessed 14, resolved 16    Disease of thyroid gland     Hematuria     last assessed 4/23/15, resolved 16    PONV (postoperative nausea and vomiting)     Skin neoplasm     last assessed 9/18/15, resolved 16     Past Surgical History:   Procedure Laterality Date    COLONOSCOPY      HYSTEROSCOPY WITH  RESECTION UTERINE TUMOR/FIBROID  2020    SHOULDER SURGERY Left 2009    tear repair    TOOTH EXTRACTION       Social History   Social History     Substance and Sexual Activity   Alcohol Use Yes    Alcohol/week: 5 0 standard drinks    Types: 5 Glasses of wine per week    Comment: occasionally / social      Social History     Substance and Sexual Activity   Drug Use No     Social History     Tobacco Use   Smoking Status Former Smoker    Types: Cigarettes    Quit date: 1990    Years since quittin 3   Smokeless Tobacco Never Used     Family History   Problem Relation Age of Onset    Hyperlipidemia Mother     Cancer Father         of spine    Stroke Father         syndrome     No Known Problems Sister     Breast cancer Maternal Aunt 72    No Known Problems Maternal Aunt        Meds/Allergies     (Not in a hospital admission)      Allergies   Allergen Reactions    Sulfa Antibiotics Rash       Objective     /70   Pulse 65   Temp 97 6 °F (36 4 °C) (Temporal)   Resp 16   Ht 5' 2" (1 575 m)   Wt 61 2 kg (135 lb)   SpO2 100%   BMI 24 69 kg/m² PHYSICAL EXAM    Gen: NAD  CV: RRR  CHEST: Clear  ABD: soft, NT/ND  EXT: no edema      ASSESSMENT/PLAN:  This is a 63y o  year old female here for egd/colonoscopy, and she is stable and optimized for her procedure

## 2021-05-06 NOTE — DISCHARGE INSTRUCTIONS
Upper Endoscopy and Colonoscopy   WHAT YOU NEED TO KNOW:   An upper endoscopy is also called an upper gastrointestinal (GI) endoscopy, or an esophagogastroduodenoscopy (EGD)  It is a procedure to examine the inside of your esophagus, stomach, and duodenum (first part of the small intestine) with a scope  You may feel bloated, gassy, or have some abdominal discomfort after your procedure  Your throat may be sore for 24 to 36 hours  You may burp or pass gas from air that is still inside your body  A colonoscopy is a procedure to examine the inside of your colon (intestine) with a scope  Polyps or tissue growths may have been removed during your colonoscopy  It is normal to feel bloated and to have some abdominal discomfort  You should be passing gas  If you have hemorrhoids or you had polyps removed, you may have a small amount of bleeding  DISCHARGE INSTRUCTIONS:   Seek care immediately if:   · You have sudden, severe abdominal pain  · You have problems swallowing  · You have a large amount of black, sticky bowel movements or blood in your bowel movements  · You have sudden trouble breathing  · You feel weak, lightheaded, or faint or your heart beats faster than normal for you  Contact your healthcare provider if:   · You have a fever and chills  · You have nausea or are vomiting  · Your abdomen is bloated or feels full and hard  · You have abdominal pain  · You have a large amount of black, sticky bowel movements or blood in your bowel movements  · You have not had a bowel movement for 3 days after your procedure  · You have rash or hives  · You have questions or concerns about your procedure  Activity:   ·       Do not lift, strain, or run for 24 hours after your procedure  ·       Rest after your procedure  You have been given medicine to relax you  Do not drive or make important decisions until the day after your procedure   Return to your normal activity as directed  ·       Relieve gas and discomfort from bloating by lying on your right side with a heating pad on your abdomen  You may need to take short walks to help the gas move out  Eat small meals until bloating is relieved  Follow up with your healthcare provider as directed: Write down your questions so you remember to ask them during your visits  If you take a blood thinner, please review the specific instructions from your endoscopist about when you should resume it  These can be found in the Recommendation and Your Medication list sections of this After Visit Summary  Colorectal Polyps   WHAT YOU NEED TO KNOW:   Colorectal polyps are small growths of tissue in the lining of the colon and rectum  Most polyps are hyperplastic polyps and are usually benign (noncancerous)  Certain types of polyps, called adenomatous polyps, may turn into cancer  DISCHARGE INSTRUCTIONS:   Follow up with your healthcare provider or gastroenterologist as directed: You may need to return for more tests, such as another colonoscopy  Write down your questions so you remember to ask them during your visits  Reduce your risk for colorectal polyps:   · Eat a variety of healthy foods:  Healthy foods include fruit, vegetables, whole-grain breads, low-fat dairy products, beans, lean meat, and fish  Ask if you need to be on a special diet  · Maintain a healthy weight:  Ask your healthcare provider if you need to lose weight and how much you need to lose  Ask for help with a weight loss program     · Exercise:  Begin to exercise slowly and do more as you get stronger  Talk with your healthcare provider before you start an exercise program      · Limit alcohol:  Your risk for polyps increases the more you drink  · Do not smoke: If you smoke, it is never too late to quit  Ask for information about how to stop      For support and more information:   · [address] [employer] ([employer]  [address], [place] [address]  Phone: 5- 832 - [phone]  Web Address: www digestive  niddk nih gov    Contact your healthcare provider or gastroenterologist if:   · You have a fever  · You have chills, a cough, or feel weak and achy  · You have abdominal pain that does not go away or gets worse after you take medicine  · Your abdomen is swollen  · You are losing weight without trying  · You have questions or concerns about your condition or care  Seek care immediately or call 911 if:   · You have sudden shortness of breath  · You have a fast heart rate, fast breathing, or are too dizzy to stand up  · You have severe abdominal pain  · You see blood in your bowel movement  © Copyright [address] Information is for End User's use only and may not be sold, redistributed or otherwise used for commercial purposes  All illustrations and images included in CareNotes® are the copyrighted property of A D A M , Inc  or [facility]  The above information is an  only  It is not intended as medical advice for individual conditions or treatments  Talk to your doctor, nurse or pharmacist before following any medical regimen to see if it is safe and effective for you  Diverticulosis   WHAT YOU NEED TO KNOW:   What is diverticulosis? Diverticulosis is a condition that causes small pockets called diverticula to form in your intestine  These pockets make it difficult for bowel movements to pass through your digestive system  What causes diverticulosis? Diverticula form when muscles have to work hard to move bowel movements through the intestine  The force causes bulges to form at weak areas in the intestine  This may happen if you eat foods that are low in fiber  Fiber helps give your bowel movements more bulk so they are larger and easier to move through your colon   The following may increase your risk of diverticulosis:  · A history of constipation    · Age 36 or older    · Obesity    · Lack of exercise    What are the signs and symptoms of diverticulosis? Diverticulosis usually does not cause any signs or symptoms  It may cause any of the following in some people:  · Pain or discomfort in your lower abdomen    · Abdominal bloating    · Constipation or diarrhea    How is diverticulosis diagnosed? Your healthcare provider will examine you and ask about your bowel movements, diet, and symptoms  He or she will also ask about any medical conditions you have or medicines you take  You may need any of the following:  · Blood tests  may be done to check for signs of inflammation  · A barium enema  is an x-ray of your colon that may show diverticula  A tube is put into your anus, and a liquid called barium is put through the tube  Barium is used so that healthcare providers can see your colon more clearly  · Flexible sigmoidoscopy  is a test to look for any changes in your lower intestines and rectum  It may also show the cause of any bleeding or pain  A soft, bendable tube with a light on the end will be put into your anus  It will then be moved forward into your intestine  · A colonoscopy  is used to look at your whole colon  A scope (long bendable tube with a light on the end) is used to take pictures  This test may show diverticula  · A CT scan , or CAT scan, may show diverticula  You may be given contrast liquid before the scan  Tell the healthcare provider if you have ever had an allergic reaction to contrast liquid  How is diverticulosis managed? The goal of treatment is to manage any symptoms you have and prevent other problems such as diverticulitis  Diverticulitis is swelling or infection of the diverticula  Your healthcare provider may recommend any of the following:  · Eat a variety of high-fiber foods  High-fiber foods help you have regular bowel movements   High-fiber foods include cooked beans, fruits, vegetables, and some cereals  Most adults need 25 to 35 grams of fiber each day  Your healthcare provider may recommend that you have more  Ask your healthcare provider how much fiber you need  Increase fiber slowly  You may have abdominal discomfort, bloating, and gas if you add fiber to your diet too quickly  You may need to take a fiber supplement if you are not getting enough fiber from food  · Medicines  to soften your bowel movements may be given  You may also need medicines to treat symptoms such as bloating and pain  · Drink liquids as directed  You may need to drink 2 to 3 liters (8 to 12 cups) of liquids every day  Ask your healthcare provider how much liquid to drink each day and which liquids are best for you  · Apply heat  on your abdomen for 20 to 30 minutes every 2 hours for as many days as directed  Heat helps decrease pain and muscle spasms  How can I help prevent diverticulitis or other symptoms? The following may help decrease your risk for diverticulitis or symptoms, such as bleeding  Talk to your provider about these or other things you can do to prevent problems that may occur with diverticulosis  · Exercise regularly  Ask your healthcare provider about the best exercise plan for you  Exercise can help you have regular bowel movements  Get 30 minutes of exercise on most days of the week  · Maintain a healthy weight  Ask your healthcare provider how much you should weigh  Ask him or her to help you create a weight loss plan if you are overweight  · Do not smoke  Nicotine and other chemicals in cigarettes increase your risk for diverticulitis  Ask your healthcare provider for information if you currently smoke and need help to quit  E-cigarettes or smokeless tobacco still contain nicotine  Talk to your healthcare provider before you use these products  · Ask your healthcare provider if it is safe to take NSAIDs    NSAIDs may increase your risk of diverticulitis  When should I seek immediate care? · You have severe pain on the left side of your lower abdomen  · Your bowel movements are bright or dark red  When should I contact my healthcare provider? · You have a fever and chills  · You feel dizzy or lightheaded  · You have nausea, or you are vomiting  · You have a change in your bowel movements  · You have questions or concerns about your condition or care  CARE AGREEMENT:   You have the right to help plan your care  Learn about your health condition and how it may be treated  Discuss treatment options with your healthcare providers to decide what care you want to receive  You always have the right to refuse treatment  The above information is an  only  It is not intended as medical advice for individual conditions or treatments  Talk to your doctor, nurse or pharmacist before following any medical regimen to see if it is safe and effective for you  © Copyright [address] Information is for End User's use only and may not be sold, redistributed or otherwise used for commercial purposes   All illustrations and images included in CareNotes® are the copyrighted property of [employer]  or [address] [place] [address]

## 2021-05-06 NOTE — ANESTHESIA PREPROCEDURE EVALUATION
Procedure:  COLONOSCOPY  EGD    Relevant Problems   CARDIO   (+) Hypercholesterolemia      ENDO   (+) Acquired hypothyroidism      GI/HEPATIC   (+) Esophageal dysphagia      MUSCULOSKELETAL   (+) Pelvic floor dysfunction      NEURO/PSYCH   (+) Chronic neck pain      Other   (+) Headache syndrome        Physical Exam    Airway    Mallampati score: II  TM Distance: >3 FB  Neck ROM: full     Dental   No notable dental hx     Cardiovascular  Rhythm: regular, Rate: normal,     Pulmonary  Pulmonary exam normal Breath sounds clear to auscultation,     Other Findings        Anesthesia Plan  ASA Score- 2     Anesthesia Type- IV sedation with anesthesia with ASA Monitors  Additional Monitors:   Airway Plan:           Plan Factors-Exercise tolerance (METS): >4 METS  Chart reviewed  Patient summary reviewed  Patient is not a current smoker  Patient did not smoke on day of surgery  Induction-     Postoperative Plan-     Informed Consent- Anesthetic plan and risks discussed with patient

## 2021-05-10 NOTE — ANESTHESIA POSTPROCEDURE EVALUATION
Post-Op Assessment Note    CV Status:  Stable    Pain management: adequate     Mental Status:  Alert and awake   Hydration Status:  Euvolemic   PONV Controlled:  Controlled   Airway Patency:  Patent      Post Op Vitals Reviewed: Yes            No complications documented      BP      Temp      Pulse    Resp      SpO2

## 2021-05-11 ENCOUNTER — TELEPHONE (OUTPATIENT)
Dept: GASTROENTEROLOGY | Facility: MEDICAL CENTER | Age: 66
End: 2021-05-11

## 2021-05-11 NOTE — TELEPHONE ENCOUNTER
Result Communications      Result Notes     Pierre Heath   5/11/2021  1:48 PM EDT      Called patient, lmom to call office   mailed letter to patient   entered 5 year colon recall    Rosalee Napoles MD   5/11/2021 10:46 AM EDT      My medical assistant will call patient with results      The  colon polyp removed was called an adenoma  This is a pre-cancerous lesion and was completely removed   There was no evidence of cancer in the polyp       she should have the colonoscopy repeated in 5 years due to a history of colon polyp     Small intestine and stomach biopsy shows mild inflammation and no infection  The rest of the biopsies are normal     Please schedule office follow up with a PA in 2-3 months

## 2021-05-11 NOTE — RESULT ENCOUNTER NOTE
My medical assistant will call patient with results  The  colon polyp removed was called an adenoma  This is a pre-cancerous lesion and was completely removed  There was no evidence of cancer in the polyp       she should have the colonoscopy repeated in 5 years due to a history of colon polyp    Small intestine and stomach biopsy shows mild inflammation and no infection  The rest of the biopsies are normal    Please schedule office follow up with a PA in 2-3 months

## 2021-06-04 ENCOUNTER — APPOINTMENT (OUTPATIENT)
Dept: LAB | Facility: CLINIC | Age: 66
End: 2021-06-04
Payer: COMMERCIAL

## 2021-06-04 DIAGNOSIS — E78.00 HYPERCHOLESTEROLEMIA: ICD-10-CM

## 2021-06-04 DIAGNOSIS — E03.9 ACQUIRED HYPOTHYROIDISM: ICD-10-CM

## 2021-06-04 DIAGNOSIS — E55.9 VITAMIN D DEFICIENCY: ICD-10-CM

## 2021-06-04 DIAGNOSIS — R73.9 HYPERGLYCEMIA: ICD-10-CM

## 2021-06-04 DIAGNOSIS — Z11.4 SCREENING FOR HIV (HUMAN IMMUNODEFICIENCY VIRUS): ICD-10-CM

## 2021-06-04 LAB
25(OH)D3 SERPL-MCNC: 18.2 NG/ML (ref 30–100)
ALBUMIN SERPL BCP-MCNC: 3.9 G/DL (ref 3.5–5)
ALP SERPL-CCNC: 67 U/L (ref 46–116)
ALT SERPL W P-5'-P-CCNC: 36 U/L (ref 12–78)
ANION GAP SERPL CALCULATED.3IONS-SCNC: 6 MMOL/L (ref 4–13)
AST SERPL W P-5'-P-CCNC: 16 U/L (ref 5–45)
BASOPHILS # BLD AUTO: 0.02 THOUSANDS/ΜL (ref 0–0.1)
BASOPHILS NFR BLD AUTO: 1 % (ref 0–1)
BILIRUB SERPL-MCNC: 0.83 MG/DL (ref 0.2–1)
BUN SERPL-MCNC: 13 MG/DL (ref 5–25)
CALCIUM SERPL-MCNC: 9.4 MG/DL (ref 8.3–10.1)
CHLORIDE SERPL-SCNC: 102 MMOL/L (ref 100–108)
CHOLEST SERPL-MCNC: 224 MG/DL (ref 50–200)
CO2 SERPL-SCNC: 29 MMOL/L (ref 21–32)
CREAT SERPL-MCNC: 0.52 MG/DL (ref 0.6–1.3)
EOSINOPHIL # BLD AUTO: 0.04 THOUSAND/ΜL (ref 0–0.61)
EOSINOPHIL NFR BLD AUTO: 1 % (ref 0–6)
ERYTHROCYTE [DISTWIDTH] IN BLOOD BY AUTOMATED COUNT: 12.6 % (ref 11.6–15.1)
EST. AVERAGE GLUCOSE BLD GHB EST-MCNC: 108 MG/DL
GFR SERPL CREATININE-BSD FRML MDRD: 102 ML/MIN/1.73SQ M
GLUCOSE P FAST SERPL-MCNC: 94 MG/DL (ref 65–99)
HBA1C MFR BLD: 5.4 %
HCT VFR BLD AUTO: 41.9 % (ref 34.8–46.1)
HDLC SERPL-MCNC: 53 MG/DL
HGB BLD-MCNC: 13.1 G/DL (ref 11.5–15.4)
IMM GRANULOCYTES # BLD AUTO: 0.01 THOUSAND/UL (ref 0–0.2)
IMM GRANULOCYTES NFR BLD AUTO: 0 % (ref 0–2)
LDLC SERPL CALC-MCNC: 133 MG/DL (ref 0–100)
LYMPHOCYTES # BLD AUTO: 1.64 THOUSANDS/ΜL (ref 0.6–4.47)
LYMPHOCYTES NFR BLD AUTO: 38 % (ref 14–44)
MCH RBC QN AUTO: 31 PG (ref 26.8–34.3)
MCHC RBC AUTO-ENTMCNC: 31.3 G/DL (ref 31.4–37.4)
MCV RBC AUTO: 99 FL (ref 82–98)
MONOCYTES # BLD AUTO: 0.4 THOUSAND/ΜL (ref 0.17–1.22)
MONOCYTES NFR BLD AUTO: 9 % (ref 4–12)
NEUTROPHILS # BLD AUTO: 2.22 THOUSANDS/ΜL (ref 1.85–7.62)
NEUTS SEG NFR BLD AUTO: 51 % (ref 43–75)
NRBC BLD AUTO-RTO: 0 /100 WBCS
PLATELET # BLD AUTO: 250 THOUSANDS/UL (ref 149–390)
PMV BLD AUTO: 10.5 FL (ref 8.9–12.7)
POTASSIUM SERPL-SCNC: 3.6 MMOL/L (ref 3.5–5.3)
PROT SERPL-MCNC: 7.2 G/DL (ref 6.4–8.2)
RBC # BLD AUTO: 4.22 MILLION/UL (ref 3.81–5.12)
SODIUM SERPL-SCNC: 137 MMOL/L (ref 136–145)
TRIGL SERPL-MCNC: 189 MG/DL
TSH SERPL DL<=0.05 MIU/L-ACNC: 1.99 UIU/ML (ref 0.36–3.74)
WBC # BLD AUTO: 4.33 THOUSAND/UL (ref 4.31–10.16)

## 2021-06-04 PROCEDURE — 36415 COLL VENOUS BLD VENIPUNCTURE: CPT

## 2021-06-04 PROCEDURE — 87389 HIV-1 AG W/HIV-1&-2 AB AG IA: CPT

## 2021-06-04 PROCEDURE — 85025 COMPLETE CBC W/AUTO DIFF WBC: CPT

## 2021-06-04 PROCEDURE — 80061 LIPID PANEL: CPT

## 2021-06-04 PROCEDURE — 82306 VITAMIN D 25 HYDROXY: CPT

## 2021-06-04 PROCEDURE — 80053 COMPREHEN METABOLIC PANEL: CPT

## 2021-06-04 PROCEDURE — 84443 ASSAY THYROID STIM HORMONE: CPT

## 2021-06-04 PROCEDURE — 83036 HEMOGLOBIN GLYCOSYLATED A1C: CPT

## 2021-06-06 LAB — HIV 1+2 AB+HIV1 P24 AG SERPL QL IA: NORMAL

## 2021-06-07 ENCOUNTER — OFFICE VISIT (OUTPATIENT)
Dept: FAMILY MEDICINE CLINIC | Facility: CLINIC | Age: 66
End: 2021-06-07
Payer: COMMERCIAL

## 2021-06-07 VITALS
WEIGHT: 136.8 LBS | OXYGEN SATURATION: 98 % | SYSTOLIC BLOOD PRESSURE: 128 MMHG | DIASTOLIC BLOOD PRESSURE: 72 MMHG | BODY MASS INDEX: 25.17 KG/M2 | TEMPERATURE: 98.9 F | RESPIRATION RATE: 20 BRPM | HEIGHT: 62 IN | HEART RATE: 64 BPM

## 2021-06-07 DIAGNOSIS — R73.9 HYPERGLYCEMIA: ICD-10-CM

## 2021-06-07 DIAGNOSIS — E78.00 HYPERCHOLESTEROLEMIA: ICD-10-CM

## 2021-06-07 DIAGNOSIS — M85.852 OSTEOPENIA OF LEFT HIP: ICD-10-CM

## 2021-06-07 DIAGNOSIS — Z78.0 POST-MENOPAUSAL: ICD-10-CM

## 2021-06-07 DIAGNOSIS — G44.89 HEADACHE SYNDROME: ICD-10-CM

## 2021-06-07 DIAGNOSIS — Z00.00 WELL ADULT ON ROUTINE HEALTH CHECK: Primary | ICD-10-CM

## 2021-06-07 DIAGNOSIS — R13.19 ESOPHAGEAL DYSPHAGIA: ICD-10-CM

## 2021-06-07 DIAGNOSIS — Z00.00 ANNUAL PHYSICAL EXAM: ICD-10-CM

## 2021-06-07 DIAGNOSIS — G89.29 CHRONIC PAIN OF LEFT ANKLE: ICD-10-CM

## 2021-06-07 DIAGNOSIS — Z12.31 ENCOUNTER FOR SCREENING MAMMOGRAM FOR BREAST CANCER: ICD-10-CM

## 2021-06-07 DIAGNOSIS — E55.9 VITAMIN D DEFICIENCY: ICD-10-CM

## 2021-06-07 DIAGNOSIS — E03.9 ACQUIRED HYPOTHYROIDISM: ICD-10-CM

## 2021-06-07 DIAGNOSIS — K59.09 CHRONIC CONSTIPATION: ICD-10-CM

## 2021-06-07 DIAGNOSIS — M25.572 CHRONIC PAIN OF LEFT ANKLE: ICD-10-CM

## 2021-06-07 PROBLEM — S92.511A CLOSED DISPLACED FRACTURE OF PROXIMAL PHALANX OF LESSER TOE OF RIGHT FOOT: Status: RESOLVED | Noted: 2020-09-11 | Resolved: 2021-06-07

## 2021-06-07 PROBLEM — N63.20 LEFT BREAST MASS: Status: RESOLVED | Noted: 2019-12-18 | Resolved: 2021-06-07

## 2021-06-07 PROCEDURE — 3008F BODY MASS INDEX DOCD: CPT | Performed by: FAMILY MEDICINE

## 2021-06-07 PROCEDURE — 3725F SCREEN DEPRESSION PERFORMED: CPT | Performed by: FAMILY MEDICINE

## 2021-06-07 PROCEDURE — 99396 PREV VISIT EST AGE 40-64: CPT | Performed by: FAMILY MEDICINE

## 2021-06-07 PROCEDURE — 1036F TOBACCO NON-USER: CPT | Performed by: FAMILY MEDICINE

## 2021-06-07 RX ORDER — NITROFURANTOIN 25; 75 MG/1; MG/1
CAPSULE ORAL
COMMUNITY
End: 2021-06-07 | Stop reason: ALTCHOICE

## 2021-06-07 RX ORDER — ERGOCALCIFEROL (VITAMIN D2) 1250 MCG
50000 CAPSULE ORAL WEEKLY
Qty: 12 CAPSULE | Refills: 3 | Status: SHIPPED | OUTPATIENT
Start: 2021-06-07 | End: 2021-12-10 | Stop reason: ALTCHOICE

## 2021-06-07 RX ORDER — LEVOTHYROXINE SODIUM 0.07 MG/1
75 TABLET ORAL DAILY
Qty: 30 TABLET | Refills: 11 | Status: SHIPPED | OUTPATIENT
Start: 2021-06-07 | End: 2022-06-10 | Stop reason: SDUPTHER

## 2021-06-07 RX ORDER — ESTRADIOL 0.1 MG/G
CREAM VAGINAL
COMMUNITY
Start: 2020-12-22 | End: 2022-06-10 | Stop reason: ALTCHOICE

## 2021-06-07 NOTE — PROGRESS NOTES
ADULT ANNUAL [facility]    NAME: Christiane Ghosh  AGE: 63 y o  SEX: female  : 1955     DATE: 2021     Assessment and Plan:     Problem List Items Addressed This Visit        Digestive    Chronic constipation     Stable at this time  Colonoscopy okay  Esophageal dysphagia     Stable at this time            Endocrine    Acquired hypothyroidism    Relevant Medications    levothyroxine 75 mcg tablet    Other Relevant Orders    TSH, 3rd generation with Free T4 reflex       Musculoskeletal and Integument    Osteopenia of left hip    Relevant Orders    DXA bone density spine hip and pelvis    Vitamin D 25 hydroxy       Other    Hypercholesterolemia    Relevant Orders    CBC and differential    Comprehensive metabolic panel    Lipid Panel with Direct LDL reflex    Vitamin D deficiency    Relevant Medications    ergocalciferol (ERGOCALCIFEROL) 1 25 MG (91644 UT) capsule    Other Relevant Orders    Vitamin D 25 hydroxy    Hyperglycemia    Relevant Orders    Comprehensive metabolic panel    Hemoglobin A1C    Chronic pain of left ankle    Relevant Orders    Ambulatory referral to Orthopedic Surgery    RESOLVED: Headache syndrome      Other Visit Diagnoses     Well adult on routine health check    -  Primary    Encounter for screening mammogram for breast cancer        Relevant Orders    Mammo screening bilateral w 3d & cad    Annual physical exam        Post-menopausal              Immunizations and preventive care screenings were discussed with patient today  Appropriate education was printed on patient's after visit summary  Counseling:  Alcohol/drug use: discussed moderation in alcohol intake, the recommendations for healthy alcohol use, and avoidance of illicit drug use  · Exercise: the importance of regular exercise/physical activity was discussed  Recommend exercise 3-5 times per week for at least 30 minutes       BMI Counseling: Body mass index is 25 02 kg/m²  The BMI is above normal  Nutrition recommendations include encouraging healthy choices of fruits and vegetables  Exercise recommendations include moderate physical activity 150 minutes/week  No follow-ups on file  Chief Complaint:     Chief Complaint   Patient presents with    Physical Exam      History of Present Illness:     Adult Annual Physical   Patient here for a comprehensive physical exam  The patient reports that she is doing well   she has lost some weight with diet and exercise  She has noted improvement in the way she feels overall  She does have history of hypothyroidism and denies excessive fatigue at this time  She does have some chronic constipation which is stable  She is having some chronic left ankle pain which did not improve significantly with physical therapy  It feels unstable intermittently  She has history of hyperglycemia and denies polyuria or polydipsia  Diet and Physical Activity  · Diet/Nutrition: well balanced diet  · Exercise: walking  Depression Screening  PHQ-9 Depression Screening    PHQ-9:   Frequency of the following problems over the past two weeks:      Little interest or pleasure in doing things: 0 - not at all  Feeling down, depressed, or hopeless: 0 - not at all  PHQ-2 Score: 0       General Health  · Sleep: sleeps well  · Hearing: normal - bilateral   · Vision: goes for regular eye exams  · Dental: regular dental visits  /GYN Health  Follows with gyn  Review of Systems:     Review of Systems   Constitutional: Negative for appetite change, chills, fatigue, fever and unexpected weight change  HENT: Negative for trouble swallowing  Eyes: Negative for visual disturbance  Respiratory: Negative for cough, chest tightness, shortness of breath and wheezing  Cardiovascular: Negative for chest pain, palpitations and leg swelling     Gastrointestinal: Negative for abdominal distention, abdominal pain, blood in stool, constipation and diarrhea  Endocrine: Negative for polyuria  Genitourinary: Negative for difficulty urinating and flank pain  Musculoskeletal: Negative for arthralgias, gait problem and myalgias  Skin: Negative for rash  Neurological: Negative for dizziness and light-headedness  Hematological: Negative for adenopathy  Does not bruise/bleed easily  Psychiatric/Behavioral: Negative for dysphoric mood and sleep disturbance  The patient is not nervous/anxious  Past Medical History:     Past Medical History:   Diagnosis Date    Anxiety     last assessed 14, resolved 16    Closed displaced fracture of proximal phalanx of lesser toe of right foot 2020    Disease of thyroid gland     Headache syndrome 2017    Hematuria     last assessed 4/23/15, resolved 16    Left breast mass 2019     Questionable mass on exam  May be patch of more cystic breast tissue, not well defined   Will check dx mammo  Mammogram was normal     PONV (postoperative nausea and vomiting)     Skin neoplasm     last assessed 9/18/15, resolved 16      Past Surgical History:     Past Surgical History:   Procedure Laterality Date    COLONOSCOPY      HYSTEROSCOPY WITH  RESECTION UTERINE TUMOR/FIBROID  2020    SHOULDER SURGERY Left 2009    tear repair    TOOTH EXTRACTION        Social History:        Social History     Socioeconomic History    Marital status:      Spouse name: None    Number of children: None    Years of education: None    Highest education level: None   Occupational History    None   Social Needs    Financial resource strain: None    Food insecurity     Worry: None     Inability: None    Transportation needs     Medical: None     Non-medical: None   Tobacco Use    Smoking status: Former Smoker     Types: Cigarettes     Quit date: 1990     Years since quittin 4    Smokeless tobacco: Never Used   Substance and Sexual Activity    Alcohol use: Yes     Alcohol/week: 5 0 standard drinks     Types: 5 Glasses of wine per week     Comment: occasionally / social     Drug use: No    Sexual activity: Yes     Partners: Male   Lifestyle    Physical activity     Days per week: None     Minutes per session: None    Stress: None   Relationships    Social connections     Talks on phone: None     Gets together: None     Attends [employer] service: None     Active member of club or organization: None     Attends meetings of clubs or organizations: None     Relationship status: None    Intimate partner violence     Fear of current or ex partner: None     Emotionally abused: None     Physically abused: None     Forced sexual activity: None   Other Topics Concern    None   Social History Narrative    None      Family History:     Family History   Problem Relation Age of Onset    Hyperlipidemia Mother     Cancer Father         of spine    Stroke Father         syndrome     No Known Problems Sister     Breast cancer Maternal Aunt 72    No Known Problems Maternal Aunt       Current Medications:     Current Outpatient Medications   Medication Sig Dispense Refill    Cholecalciferol (Vitamin D3) 50 MCG (2000 UT) capsule Take 1 capsule (2,000 Units total) by mouth daily  0    estradiol (ESTRACE) 0 1 mg/g vaginal cream Place 2g per vaginal daily, then 1g per vagina twice weekly      ibuprofen (MOTRIN) 200 mg tablet Take 200 mg by mouth every 6 (six) hours as needed      levothyroxine 75 mcg tablet Take 1 tablet (75 mcg total) by mouth daily 30 tablet 11    ergocalciferol (ERGOCALCIFEROL) 1 25 MG (30096 UT) capsule Take 1 capsule (50,000 Units total) by mouth once a week 12 capsule 3    fluticasone (FLONASE) 50 mcg/act nasal spray 1 spray into each nostril       No current facility-administered medications for this visit  Allergies:      Allergies   Allergen Reactions    Sulfa Antibiotics Rash      Physical Exam:     BP 128/72 (BP Location: Left arm, Patient Position: Sitting, Cuff Size: Adult)   Pulse 64   Temp 98 9 °F (37 2 °C) (Tympanic)   Resp 20   Ht 5' 2" (1 575 m)   Wt 62 1 kg (136 lb 12 8 oz)   SpO2 98%   BMI 25 02 kg/m²     Physical Exam  Constitutional:       General: She is not in acute distress  Appearance: She is well-developed  She is not diaphoretic  HENT:      Head: Normocephalic  Right Ear: External ear normal       Left Ear: External ear normal       Nose: Nose normal    Eyes:      General: No scleral icterus  Right eye: No discharge  Left eye: No discharge  Conjunctiva/sclera: Conjunctivae normal       Pupils: Pupils are equal, round, and reactive to light  Neck:      Thyroid: No thyromegaly  Trachea: No tracheal deviation  Cardiovascular:      Rate and Rhythm: Normal rate and regular rhythm  Heart sounds: Normal heart sounds  No murmur  Pulmonary:      Effort: Pulmonary effort is normal  No respiratory distress  Breath sounds: Normal breath sounds  Abdominal:      General: Bowel sounds are normal  There is no distension  Palpations: Abdomen is soft  Tenderness: There is no abdominal tenderness  Musculoskeletal: Normal range of motion  General: No tenderness or deformity  Lymphadenopathy:      Cervical: No cervical adenopathy  Skin:     General: Skin is warm  Coloration: Skin is not pale  Findings: No erythema or rash  Neurological:      Cranial Nerves: No cranial nerve deficit        Coordination: Coordination normal    Psychiatric:         Behavior: Behavior normal           MD Katarina Dias [id number]

## 2021-06-07 NOTE — PATIENT INSTRUCTIONS

## 2021-06-30 ENCOUNTER — OFFICE VISIT (OUTPATIENT)
Dept: FAMILY MEDICINE CLINIC | Facility: CLINIC | Age: 66
End: 2021-06-30
Payer: COMMERCIAL

## 2021-06-30 ENCOUNTER — TELEPHONE (OUTPATIENT)
Dept: FAMILY MEDICINE CLINIC | Facility: CLINIC | Age: 66
End: 2021-06-30

## 2021-06-30 VITALS
HEART RATE: 64 BPM | WEIGHT: 134 LBS | BODY MASS INDEX: 24.66 KG/M2 | HEIGHT: 62 IN | OXYGEN SATURATION: 99 % | DIASTOLIC BLOOD PRESSURE: 68 MMHG | SYSTOLIC BLOOD PRESSURE: 122 MMHG | TEMPERATURE: 97 F

## 2021-06-30 DIAGNOSIS — H60.541 DERMATITIS OF EAR CANAL, RIGHT: Primary | ICD-10-CM

## 2021-06-30 PROCEDURE — 1036F TOBACCO NON-USER: CPT | Performed by: FAMILY MEDICINE

## 2021-06-30 PROCEDURE — 99214 OFFICE O/P EST MOD 30 MIN: CPT | Performed by: FAMILY MEDICINE

## 2021-06-30 PROCEDURE — 3008F BODY MASS INDEX DOCD: CPT | Performed by: FAMILY MEDICINE

## 2021-06-30 RX ORDER — MOMETASONE FUROATE 1 MG/ML
SOLUTION TOPICAL DAILY
Qty: 60 ML | Refills: 0 | Status: SHIPPED | OUTPATIENT
Start: 2021-06-30 | End: 2022-06-10 | Stop reason: ALTCHOICE

## 2021-06-30 NOTE — PROGRESS NOTES
FAMILY PRACTICE OFFICE VISIT    NAME: Titi Love    AGE: 63 y o  SEX: female  : 1955   MRN: [id number]    DATE: 2021  TIME: 2:17 PM    Assessment and Plan   1  Dermatitis of ear canal, right  Recommendation to keep water out of ear while showering - during treatment period  Avoid q-tips and other foreign bodies  [place] prn          Chief Complaint     Chief Complaint   Patient presents with    Earache     Right       History of Present Illness   Titi Love is a 63y o -year-old female who presents today for acute concern - right ear discomfort -   She feels that something is annoying and is tender to touch in the anterior/posterior portion of ear   Feels itchy    Not noticing decreased hearing    Pt thinks that symptoms started about 3 weeks ago after she got some water in her ear with   Pt with some seasonal allergies and mild nasal congestion  Has not used her flonase  Pt did not take anything for the symptoms  Review of Systems   Review of Systems   Constitutional: Negative for fever  HENT: Positive for congestion  Negative for ear discharge, ear pain and tinnitus  Mild nasal congestion  Right ear feels itchy     Respiratory: Negative for cough and shortness of breath  Allergic/Immunologic: Positive for environmental allergies         Active Problem List     Patient Active Problem List   Diagnosis    Chronic constipation    Hypercholesterolemia    Acquired hypothyroidism    Vitamin D deficiency    Chronic neck pain    Hip pain    Recurrent UTI    Osteopenia of left hip    Pelvic floor dysfunction    Atrophic vaginitis    Hyperglycemia    Overweight    Esophageal dysphagia    Chronic pain of left ankle         Past Medical History:  Past Medical History:   Diagnosis Date    Anxiety     last assessed 14, resolved 16    Closed displaced fracture of proximal phalanx of lesser toe of right foot 2020    Disease of thyroid gland  Headache syndrome 2017    Hematuria     last assessed 4/23/15, resolved 16    Left breast mass 2019     Questionable mass on exam 2019 May be patch of more cystic breast tissue, not well defined  Will check dx mammo  Mammogram was normal     PONV (postoperative nausea and vomiting)     Skin neoplasm     last assessed 9/18/15, resolved 16       Past Surgical History:  Past Surgical History:   Procedure Laterality Date    COLONOSCOPY      HYSTEROSCOPY WITH  RESECTION UTERINE TUMOR/FIBROID  2020    SHOULDER SURGERY Left 2009    tear repair    TOOTH EXTRACTION         Family History:  Family History   Problem Relation Age of Onset    Hyperlipidemia Mother     Cancer Father         of spine    Stroke Father         syndrome     No Known Problems Sister     Breast cancer Maternal Aunt 72    No Known Problems Maternal Aunt        Social History:  Social History     Socioeconomic History    Marital status:      Spouse name: Not on file    Number of children: Not on file    Years of education: Not on file    Highest education level: Not on file   Occupational History    Not on file   Tobacco Use    Smoking status: Former Smoker     Types: Cigarettes     Quit date: 1990     Years since quittin 5    Smokeless tobacco: Never Used   Substance and Sexual Activity    Alcohol use: Yes     Alcohol/week: 5 0 standard drinks     Types: 5 Glasses of wine per week     Comment: occasionally / social     Drug use: No    Sexual activity: Yes     Partners: Male   Other Topics Concern    Not on file   Social History Narrative    Not on file     Social Determinants of Health     Financial Resource Strain:     Difficulty of Paying Living Expenses:    Food Insecurity:     Worried About Running Out of Food in the Last Year:     [address] in the Last Year:    Transportation Needs:     Lack of Transportation (Medical):      Lack of Transportation (Non-Medical): Physical Activity:     Days of Exercise per Week:     Minutes of Exercise per Session:    Stress:     Feeling of Stress :    Social Connections:     Frequency of Communication with Friends and Family:     Frequency of Social Gatherings with Friends and Family:     Attends Denominational Services:     Active Member of Clubs or Organizations:     Attends Club or Organization Meetings:     Marital Status:    Intimate Partner Violence:     Fear of Current or Ex-Partner:     Emotionally Abused:     Physically Abused:     Sexually Abused:        Objective     Vitals:    06/30/21 1407   BP: 122/68   Pulse: 64   Temp: (!) 97 °F (36 1 °C)   SpO2: 99%     Wt Readings from Last 3 Encounters:   06/30/21 60 8 kg (134 lb)   06/07/21 62 1 kg (136 lb 12 8 oz)   05/06/21 61 2 kg (135 lb)       Physical Exam  Vitals and nursing note reviewed  Constitutional:       General: She is not in acute distress  Appearance: Normal appearance  She is not toxic-appearing or diaphoretic  HENT:      Right Ear: Tympanic membrane normal  There is no impacted cerumen  Left Ear: Tympanic membrane normal  There is no impacted cerumen  Ears:      Comments: Right external auditory canal - appears denuded  But no gross signs of infection  Both tm's are clear   No parotid gland tenderness  And no posterior auricular tenderness with palpation  No discharge  Nose: No congestion  Mouth/Throat:      Mouth: Mucous membranes are moist       Pharynx: No oropharyngeal exudate or posterior oropharyngeal erythema  Comments: No post-nasal drip noted  Mucous membranes moist and pink      Eyes:      General: No scleral icterus  Cardiovascular:      Rate and Rhythm: Normal rate and regular rhythm  Heart sounds: Normal heart sounds  Pulmonary:      Effort: Pulmonary effort is normal  No respiratory distress  Breath sounds: Normal breath sounds  No wheezing  Neurological:      Mental Status: She is alert  Psychiatric:         Mood and Affect: Mood normal          Behavior: Behavior normal          Thought Content:  Thought content normal          Judgment: Judgment normal          Pertinent Laboratory/Diagnostic Studies:  Lab Results   Component Value Date    GLUCOSE 98 12/03/2015    BUN 13 06/04/2021    CREATININE 0 52 (L) 06/04/2021    CALCIUM 9 4 06/04/2021     12/03/2015    K 3 6 06/04/2021    CO2 29 06/04/2021     06/04/2021     Lab Results   Component Value Date    ALT 36 06/04/2021    AST 16 06/04/2021    ALKPHOS 67 06/04/2021    BILITOT 0 77 12/03/2015       Lab Results   Component Value Date    WBC 4 33 06/04/2021    HGB 13 1 06/04/2021    HCT 41 9 06/04/2021    MCV 99 (H) 06/04/2021     06/04/2021       No results found for: TSH    Lab Results   Component Value Date    CHOL 227 12/03/2015     Lab Results   Component Value Date    TRIG 189 (H) 06/04/2021     Lab Results   Component Value Date    HDL 53 06/04/2021     Lab Results   Component Value Date    LDLCALC 133 (H) 06/04/2021     Lab Results   Component Value Date    HGBA1C 5 4 06/04/2021       Results for orders placed or performed in visit on 06/04/21   HIV [facility]ib[facility]th [facility]   Result Value Ref Range    HIV-1/HIV-2 Ab Non-Reactive Non-Reactive   Comprehensive metabolic panel   Result Value Ref Range    Sodium 137 136 - 145 mmol/L    Potassium 3 6 3 5 - 5 3 mmol/L    Chloride 102 100 - 108 mmol/L    CO2 29 21 - 32 mmol/L    ANION GAP 6 4 - 13 mmol/L    BUN 13 5 - 25 mg/dL    Creatinine 0 52 (L) 0 60 - 1 30 mg/dL    Glucose, Fasting 94 65 - 99 mg/dL    Calcium 9 4 8 3 - 10 1 mg/dL    AST 16 5 - 45 U/L    ALT 36 12 - 78 U/L    Alkaline Phosphatase 67 46 - 116 U/L    Total Protein 7 2 6 4 - 8 2 g/dL    Albumin 3 9 3 5 - 5 0 g/dL    Total Bilirubin 0 83 0 20 - 1 00 mg/dL    eGFR 102 ml/min/1 73sq m   Lipid Panel with Direct LDL reflex   Result Value Ref Range    Cholesterol 224 (H) 50 - 200 mg/dL Triglycerides 189 (H) <=150 mg/dL    HDL, Direct 53 >=40 mg/dL    LDL Calculated 133 (H) 0 - 100 mg/dL   TSH, 3rd generation with Free T4 reflex   Result Value Ref Range    TSH 3RD GENERATON 1 990 0 358 - 3 740 uIU/mL   Hemoglobin A1C   Result Value Ref Range    Hemoglobin A1C 5 4 Normal 3 8-5 6%; PreDiabetic 5 7-6 4%; Diabetic >=6 5%; Glycemic control for adults with diabetes <7 0% %     mg/dl   CBC and differential   Result Value Ref Range    WBC 4 33 4 31 - 10 16 Thousand/uL    RBC 4 22 3 81 - 5 12 Million/uL    Hemoglobin 13 1 11 5 - 15 4 g/dL    Hematocrit 41 9 34 8 - 46 1 %    MCV 99 (H) 82 - 98 fL    MCH 31 0 26 8 - 34 3 pg    MCHC 31 3 (L) 31 4 - 37 4 g/dL    RDW 12 6 11 6 - 15 1 %    MPV 10 5 8 9 - 12 7 fL    Platelets 507 389 - 148 Thousands/uL    nRBC 0 /100 WBCs    Neutrophils Relative 51 43 - 75 %    Immat GRANS % 0 0 - 2 %    Lymphocytes Relative 38 14 - 44 %    Monocytes Relative 9 4 - 12 %    Eosinophils Relative 1 0 - 6 %    Basophils Relative 1 0 - 1 %    Neutrophils Absolute 2 22 1 85 - 7 62 Thousands/µL    Immature Grans Absolute 0 01 0 00 - 0 20 Thousand/uL    Lymphocytes Absolute 1 64 0 60 - 4 47 Thousands/µL    Monocytes Absolute 0 40 0 17 - 1 22 Thousand/µL    Eosinophils Absolute 0 04 0 00 - 0 61 Thousand/µL    Basophils Absolute 0 02 0 00 - 0 10 Thousands/µL   Vitamin D 25 hydroxy   Result Value Ref Range    Vit D, 25-Hydroxy 18 2 (L) 30 0 - 100 0 ng/mL       No orders of the defined types were placed in this encounter        ALLERGIES:  Allergies   Allergen Reactions    Sulfa Antibiotics Rash       Current Medications     Current Outpatient Medications   Medication Sig Dispense Refill    Cholecalciferol (Vitamin D3) 50 MCG (2000 UT) capsule Take 1 capsule (2,000 Units total) by mouth daily  0    ergocalciferol (ERGOCALCIFEROL) 1 25 MG (96507 UT) capsule Take 1 capsule (50,000 Units total) by mouth once a week 12 capsule 3    estradiol (ESTRACE) 0 1 mg/g vaginal cream Has on hand, has not started yet (6/30/2021)      fluticasone (FLONASE) 50 mcg/act nasal spray 1 spray into each nostril      ibuprofen (MOTRIN) 200 mg tablet Take 200 mg by mouth every 6 (six) hours as needed      levothyroxine 75 mcg tablet Take 1 tablet (75 mcg total) by mouth daily 30 tablet 11     No current facility-administered medications for this visit           Health Maintenance     Health Maintenance   Topic Date Due    DXA SCAN  06/24/2021    MAMMOGRAM  09/08/2021    Depression Screening PHQ  06/07/2022    BMI: Adult  06/07/2022    Annual Physical  06/07/2022    Cervical Cancer Screening  12/18/2022    DTaP,Tdap,and Td Vaccines (2 - Td or Tdap) 02/01/2026    Colorectal Cancer Screening  05/06/2026    HIV Screening  Completed    Hepatitis C Screening  Completed    Influenza Vaccine  Completed    COVID-19 Vaccine  Completed    Pneumococcal Vaccine: Pediatrics (0 to 5 Years) and At-Risk Patients (6 to 59 Years)  Aged Out    HIB Vaccine  Aged Out    Hepatitis B Vaccine  Aged Out    IPV Vaccine  Aged Out    Hepatitis A Vaccine  Aged Out    Meningococcal ACWY Vaccine  Aged Out    HPV Vaccine  Aged [employer] History   Administered Date(s) Administered    INFLUENZA 12/07/2010, 09/11/2015, 11/02/2017, 09/28/2020    Influenza Quadrivalent Preservative Free 3 years and older IM 11/02/2017    Influenza Quadrivalent, 6-35 Months IM 09/11/2015, 09/22/2016    Influenza, recombinant, quadrivalent,injectable, preservative free 10/24/2018, 10/22/2019, 09/30/2020    SARS-CoV-2 / COVID-19 mRNA IM (Pfizer-BioNTech) 03/15/2021, 04/05/2021    Tdap 02/01/2016          Antonieta Penaloza DO

## 2021-09-28 ENCOUNTER — OFFICE VISIT (OUTPATIENT)
Dept: FAMILY MEDICINE CLINIC | Facility: CLINIC | Age: 66
End: 2021-09-28
Payer: COMMERCIAL

## 2021-09-28 VITALS
SYSTOLIC BLOOD PRESSURE: 110 MMHG | HEART RATE: 81 BPM | OXYGEN SATURATION: 95 % | HEIGHT: 62 IN | BODY MASS INDEX: 25.88 KG/M2 | RESPIRATION RATE: 12 BRPM | TEMPERATURE: 97.5 F | DIASTOLIC BLOOD PRESSURE: 60 MMHG | WEIGHT: 140.6 LBS

## 2021-09-28 DIAGNOSIS — R35.0 URINE FREQUENCY: Primary | ICD-10-CM

## 2021-09-28 DIAGNOSIS — R30.0 DYSURIA: ICD-10-CM

## 2021-09-28 LAB
BACTERIA UR QL AUTO: ABNORMAL /HPF
BILIRUB UR QL STRIP: NEGATIVE
CLARITY UR: ABNORMAL
COLOR UR: ABNORMAL
GLUCOSE UR STRIP-MCNC: NEGATIVE MG/DL
HGB UR QL STRIP.AUTO: ABNORMAL
KETONES UR STRIP-MCNC: NEGATIVE MG/DL
LEUKOCYTE ESTERASE UR QL STRIP: ABNORMAL
NITRITE UR QL STRIP: NEGATIVE
NON-SQ EPI CELLS URNS QL MICRO: ABNORMAL /HPF
PH UR STRIP.AUTO: 6 [PH]
PROT UR STRIP-MCNC: ABNORMAL MG/DL
RBC #/AREA URNS AUTO: ABNORMAL /HPF
SL AMB  POCT GLUCOSE, UA: ABNORMAL
SL AMB LEUKOCYTE ESTERASE,UA: ABNORMAL
SL AMB POCT BILIRUBIN,UA: ABNORMAL
SL AMB POCT BLOOD,UA: ABNORMAL
SL AMB POCT CLARITY,UA: ABNORMAL
SL AMB POCT COLOR,UA: ABNORMAL
SL AMB POCT KETONES,UA: ABNORMAL
SL AMB POCT NITRITE,UA: POSITIVE
SL AMB POCT PH,UA: 5
SL AMB POCT SPECIFIC GRAVITY,UA: 1.01
SL AMB POCT URINE PROTEIN: ABNORMAL
SL AMB POCT UROBILINOGEN: 0.2
SP GR UR STRIP.AUTO: 1.01 (ref 1–1.03)
UROBILINOGEN UR QL STRIP.AUTO: 1 E.U./DL
WBC #/AREA URNS AUTO: ABNORMAL /HPF

## 2021-09-28 PROCEDURE — 81001 URINALYSIS AUTO W/SCOPE: CPT | Performed by: FAMILY MEDICINE

## 2021-09-28 PROCEDURE — 1036F TOBACCO NON-USER: CPT | Performed by: FAMILY MEDICINE

## 2021-09-28 PROCEDURE — 3008F BODY MASS INDEX DOCD: CPT | Performed by: FAMILY MEDICINE

## 2021-09-28 PROCEDURE — 99214 OFFICE O/P EST MOD 30 MIN: CPT | Performed by: FAMILY MEDICINE

## 2021-09-28 PROCEDURE — 81002 URINALYSIS NONAUTO W/O SCOPE: CPT | Performed by: FAMILY MEDICINE

## 2021-09-28 PROCEDURE — 87086 URINE CULTURE/COLONY COUNT: CPT | Performed by: FAMILY MEDICINE

## 2021-09-28 RX ORDER — NITROFURANTOIN 25; 75 MG/1; MG/1
100 CAPSULE ORAL 2 TIMES DAILY
Qty: 10 CAPSULE | Refills: 0 | Status: SHIPPED | OUTPATIENT
Start: 2021-09-28 | End: 2021-10-03

## 2021-09-28 NOTE — PROGRESS NOTES
FAMILY PRACTICE OFFICE VISIT    NAME: Sophy Scott    AGE: 61 y o  SEX: female  : 1955   MRN: [id number]    DATE: 2021  TIME: 2:06 PM    Assessment and Plan      1  Urine frequency  Urine dip (+) today for leuk's, nitrites and blood  Will empirically begin treatment with antibiotic and send urine for culture  Increase po fluid intake, wipe front to back, wear cotton panties, void after intercourse    Can consider estrogen cream and discussed that sometimes post-menopausal women at higher risk of UTI's due to vaginal and urethral atrophy  Keep lubricated with intercourse  Repeat urine in 2 weeks to check for resolution  Pt to call if blood persists  - POCT urine dip  - UA (URINE) with reflex to Scope  - Urine culture; Future    2  Dysuria      Patient Instructions   Take antibiotic 2x/day for 5 days  Repeat urine in 2 weeks  Call dr Samaria Roque if ongoing blood noted  Coconut oil  Estrogen cream  Increase po fluid intake, wipe front to back, wear cotton panties, void after intercourse            There are no Patient Instructions on file for this visit  Had covid vaccine  Remote h/o tob use      Chief Complaint     Chief Complaint   Patient presents with    Possible UTI       History of Present Illness   Sophy Scott is a 61y o -year-old female who presents today with possible uti  Symptoms began over a week ago - dysuria and increased frequency  Symptoms were coming and going so pt held off on coming in  Some pressure in lower abdomen  Gets UTI about once yearly or so    Review of Systems   Review of Systems   Constitutional: Negative for chills and fever  Respiratory: Negative for cough, shortness of breath and wheezing  Cardiovascular: Negative for chest pain and palpitations  Gastrointestinal: Negative for abdominal pain, nausea and vomiting  Genitourinary: Positive for dysuria, frequency, hematuria and urgency  Negative for flank pain          Pt thinks that there may have been some blood in sample given at office today but did not notice prior  Denies vaginal bleeding  Up to date with gyn exams  No h/o renal calculi  Pt discussed possibly starting topical estrogen with gyn - not currently using  (but has rx for it)         Active Problem List     Patient Active Problem List   Diagnosis    Chronic constipation    Hypercholesterolemia    Acquired hypothyroidism    Vitamin D deficiency    Chronic neck pain    Hip pain    Recurrent UTI    Osteopenia of left hip    Pelvic floor dysfunction    Atrophic vaginitis    Hyperglycemia    Overweight    Esophageal dysphagia    Chronic pain of left ankle         Past Medical History:  Past Medical History:   Diagnosis Date    Anxiety     last assessed 12/1/14, resolved 2/1/16    Closed displaced fracture of proximal phalanx of lesser toe of right foot 9/11/2020    Disease of thyroid gland     Headache syndrome 5/12/2017    Hematuria     last assessed 4/23/15, resolved 2/1/16    Left breast mass 12/18/2019     Questionable mass on exam 2019 May be patch of more cystic breast tissue, not well defined   Will check dx mammo  Mammogram was normal 9/20    PONV (postoperative nausea and vomiting)     Skin neoplasm     last assessed 9/18/15, resolved 2/1/16       Past Surgical History:  Past Surgical History:   Procedure Laterality Date    COLONOSCOPY      HYSTEROSCOPY WITH  RESECTION UTERINE TUMOR/FIBROID  03/2020    SHOULDER SURGERY Left 2009    tear repair    TOOTH EXTRACTION         Family History:  Family History   Problem Relation Age of Onset    Hyperlipidemia Mother     Cancer Father         of spine    Stroke Father         syndrome     No Known Problems Sister     Breast cancer Maternal Aunt 72    No Known Problems Maternal Aunt        Social History:  Social History     Socioeconomic History    Marital status:      Spouse name: Not on file    Number of children: Not on file    Years of education: Not on file    Highest education level: Not on file   Occupational History    Not on file   Tobacco Use    Smoking status: Former Smoker     Types: Cigarettes     Quit date: 1990     Years since quittin 7    Smokeless tobacco: Never Used   Substance and Sexual Activity    Alcohol use: Yes     Alcohol/week: 5 0 standard drinks     Types: 5 Glasses of wine per week     Comment: occasionally / social     Drug use: No    Sexual activity: Yes     Partners: Male   Other Topics Concern    Not on file   Social History Narrative    Not on file     Social Determinants of Health     Financial Resource Strain:     Difficulty of Paying Living Expenses:    Food Insecurity:     Worried About Running Out of Food in the Last Year:     [address] in the Last Year:    Transportation Needs:     Lack of Transportation (Medical):  Lack of Transportation (Non-Medical):    Physical Activity:     Days of Exercise per Week:     Minutes of Exercise per Session:    Stress:     Feeling of Stress :    Social Connections:     Frequency of Communication with Friends and Family:     Frequency of Social Gatherings with Friends and Family:     Attends Jewish Services:     Active Member of Clubs or Organizations:     Attends Club or Organization Meetings:     Marital Status:    Intimate Partner Violence:     Fear of Current or Ex-Partner:     Emotionally Abused:     Physically Abused:     Sexually Abused:        Objective     Vitals:    21 1358   BP: 110/60   Pulse: 81   Resp: 12   Temp: 97 5 °F (36 4 °C)   SpO2: 95%     Wt Readings from Last 3 Encounters:   21 63 8 kg (140 lb 9 6 oz)   21 60 8 kg (134 lb)   21 62 1 kg (136 lb 12 8 oz)       Physical Exam  Vitals and nursing note reviewed  Constitutional:       General: She is not in acute distress  Appearance: Normal appearance  She is not ill-appearing or toxic-appearing     HENT:      Mouth/Throat:      Comments: Mucous membranes moist and pink      Cardiovascular:      Rate and Rhythm: Normal rate and regular rhythm  Pulses: Normal pulses  Heart sounds: Normal heart sounds  No murmur heard  Pulmonary:      Effort: Pulmonary effort is normal  No respiratory distress  Breath sounds: Normal breath sounds  No wheezing, rhonchi or rales  Abdominal:      General: Abdomen is flat  There is no distension  Palpations: Abdomen is soft  There is no mass  Tenderness: There is no abdominal tenderness  There is no right CVA tenderness, left CVA tenderness, guarding or rebound  Comments: No suprapubic tenderness  No peritoneal signs     Skin:     Coloration: Skin is not jaundiced  Neurological:      Mental Status: She is alert  Psychiatric:         Mood and Affect: Mood normal          Behavior: Behavior normal          Thought Content:  Thought content normal          Judgment: Judgment normal          Pertinent Laboratory/Diagnostic Studies:  Lab Results   Component Value Date    GLUCOSE 98 12/03/2015    BUN 13 06/04/2021    CREATININE 0 52 (L) 06/04/2021    CALCIUM 9 4 06/04/2021     12/03/2015    K 3 6 06/04/2021    CO2 29 06/04/2021     06/04/2021     Lab Results   Component Value Date    ALT 36 06/04/2021    AST 16 06/04/2021    ALKPHOS 67 06/04/2021    BILITOT 0 77 12/03/2015       Lab Results   Component Value Date    WBC 4 33 06/04/2021    HGB 13 1 06/04/2021    HCT 41 9 06/04/2021    MCV 99 (H) 06/04/2021     06/04/2021       No results found for: TSH    Lab Results   Component Value Date    CHOL 227 12/03/2015     Lab Results   Component Value Date    TRIG 189 (H) 06/04/2021     Lab Results   Component Value Date    HDL 53 06/04/2021     Lab Results   Component Value Date    LDLCALC 133 (H) 06/04/2021     Lab Results   Component Value Date    HGBA1C 5 4 06/04/2021       Results for orders placed or performed in visit on 06/04/21   HIV 1/2 Antigen/Antibody (4th Generation) w Reflex DEVAUGHNN Result Value Ref Range    HIV-1/HIV-2 Ab Non-Reactive Non-Reactive   Comprehensive metabolic panel   Result Value Ref Range    Sodium 137 136 - 145 mmol/L    Potassium 3 6 3 5 - 5 3 mmol/L    Chloride 102 100 - 108 mmol/L    CO2 29 21 - 32 mmol/L    ANION GAP 6 4 - 13 mmol/L    BUN 13 5 - 25 mg/dL    Creatinine 0 52 (L) 0 60 - 1 30 mg/dL    Glucose, Fasting 94 65 - 99 mg/dL    Calcium 9 4 8 3 - 10 1 mg/dL    AST 16 5 - 45 U/L    ALT 36 12 - 78 U/L    Alkaline Phosphatase 67 46 - 116 U/L    Total Protein 7 2 6 4 - 8 2 g/dL    Albumin 3 9 3 5 - 5 0 g/dL    Total Bilirubin 0 83 0 20 - 1 00 mg/dL    eGFR 102 ml/min/1 73sq m   Lipid Panel with Direct LDL reflex   Result Value Ref Range    Cholesterol 224 (H) 50 - 200 mg/dL    Triglycerides 189 (H) <=150 mg/dL    HDL, Direct 53 >=40 mg/dL    LDL Calculated 133 (H) 0 - 100 mg/dL   TSH, 3rd generation with Free T4 reflex   Result Value Ref Range    TSH 3RD GENERATON 1 990 0 358 - 3 740 uIU/mL   Hemoglobin A1C   Result Value Ref Range    Hemoglobin A1C 5 4 Normal 3 8-5 6%; PreDiabetic 5 7-6 4%;  Diabetic >=6 5%; Glycemic control for adults with diabetes <7 0% %     mg/dl   CBC and differential   Result Value Ref Range    WBC 4 33 4 31 - 10 16 Thousand/uL    RBC 4 22 3 81 - 5 12 Million/uL    Hemoglobin 13 1 11 5 - 15 4 g/dL    Hematocrit 41 9 34 8 - 46 1 %    MCV 99 (H) 82 - 98 fL    MCH 31 0 26 8 - 34 3 pg    MCHC 31 3 (L) 31 4 - 37 4 g/dL    RDW 12 6 11 6 - 15 1 %    MPV 10 5 8 9 - 12 7 fL    Platelets 612 954 - 713 Thousands/uL    nRBC 0 /100 WBCs    Neutrophils Relative 51 43 - 75 %    Immat GRANS % 0 0 - 2 %    Lymphocytes Relative 38 14 - 44 %    Monocytes Relative 9 4 - 12 %    Eosinophils Relative 1 0 - 6 %    Basophils Relative 1 0 - 1 %    Neutrophils Absolute 2 22 1 85 - 7 62 Thousands/µL    Immature Grans Absolute 0 01 0 00 - 0 20 Thousand/uL    Lymphocytes Absolute 1 64 0 60 - 4 47 Thousands/µL    Monocytes Absolute 0 40 0 17 - 1 22 Thousand/µL Eosinophils Absolute 0 04 0 00 - 0 61 Thousand/µL    Basophils Absolute 0 02 0 00 - 0 10 Thousands/µL   Vitamin D 25 hydroxy   Result Value Ref Range    Vit D, 25-Hydroxy 18 2 (L) 30 0 - 100 0 ng/mL       No orders of the defined types were placed in this encounter  ALLERGIES:  Allergies   Allergen Reactions    Sulfa Antibiotics Rash       Current Medications     Current Outpatient Medications   Medication Sig Dispense Refill    Cholecalciferol (Vitamin D3) 50 MCG (2000 UT) capsule Take 1 capsule (2,000 Units total) by mouth daily  0    ergocalciferol (ERGOCALCIFEROL) 1 25 MG (72698 UT) capsule Take 1 capsule (50,000 Units total) by mouth once a week 12 capsule 3    estradiol (ESTRACE) 0 1 mg/g vaginal cream Has on hand, has not started yet (6/30/2021)      fluticasone (FLONASE) 50 mcg/act nasal spray 1 spray into each nostril      ibuprofen (MOTRIN) 200 mg tablet Take 200 mg by mouth every 6 (six) hours as needed      levothyroxine 75 mcg tablet Take 1 tablet (75 mcg total) by mouth daily 30 tablet 11    mometasone (ELOCON) 0 1 % lotion Apply topically daily To affected ear canal 60 mL 0     No current facility-administered medications for this visit           Health Maintenance     Health Maintenance   Topic Date Due    DXA SCAN  06/24/2021    Influenza Vaccine (1) 09/01/2021    Breast Cancer Screening: Mammogram  09/08/2021    Depression Screening  06/07/2022    BMI: Followup Plan  06/07/2022    Annual Physical  06/07/2022    BMI: Adult  06/30/2022    Cervical Cancer Screening  12/18/2022    DTaP,Tdap,and Td Vaccines (2 - Td or Tdap) 02/01/2026    Colorectal Cancer Screening  05/06/2026    HIV Screening  Completed    Hepatitis C Screening  Completed    COVID-19 Vaccine  Completed    Pneumococcal Vaccine: Pediatrics (0 to 5 Years) and At-Risk Patients (6 to 59 Years)  Aged Out    HIB Vaccine  Aged Out    Hepatitis B Vaccine  Aged Out    IPV Vaccine  Aged Out    Hepatitis A Vaccine Aged Out    Meningococcal ACWY Vaccine  Aged Out    HPV Vaccine  Aged Out     Immunization History   Administered Date(s) Administered    INFLUENZA 12/07/2010, 09/11/2015, 11/02/2017, 09/28/2020    Influenza Quadrivalent Preservative Free 3 years and older IM 11/02/2017    Influenza Quadrivalent, 6-35 Months IM 09/11/2015, 09/22/2016    Influenza, recombinant, quadrivalent,injectable, preservative free 10/24/2018, 10/22/2019, 09/30/2020    SARS-CoV-2 / COVID-19 mRNA IM (Pfizer-BioNTech) 03/15/2021, 04/05/2021    Tdap 02/01/2016          Popeye Cuevas DO

## 2021-09-28 NOTE — PATIENT INSTRUCTIONS
Take antibiotic 2x/day for 5 days  Repeat urine in 2 weeks  Call dr Blair Carvajal if ongoing blood noted  Coconut oil  Estrogen cream  Increase po fluid intake, wipe front to back, wear cotton panties, void after intercourse

## 2021-09-29 LAB — BACTERIA UR CULT: NORMAL

## 2021-09-29 NOTE — RESULT ENCOUNTER NOTE
PLEASE LET PT KNOW THAT HER URINE CULTURE IS NEGATIVE FOR SIGNIFICANT BACTERIAL GROWTH; SHE CAN STOP THE ANTIBIOTIC  HOWEVER, I WOULD LIKE HER TO REPEAT A URINE IN 2 WEEKS SINCE SAMPLE IN OFFICE SHOWED A LOT OF RBC'S AND PROTEIN  ASK HER HOW HER SYMPTOMS ARE AND THEN SEND BACK TO ME  THANKS!

## 2021-09-30 ENCOUNTER — VBI (OUTPATIENT)
Dept: ADMINISTRATIVE | Facility: OTHER | Age: 66
End: 2021-09-30

## 2021-09-30 NOTE — TELEPHONE ENCOUNTER
09/30/21 11:57 AM     See documentation in the VB [facility] SmartForm       Colan

## 2021-10-12 ENCOUNTER — APPOINTMENT (OUTPATIENT)
Dept: LAB | Facility: CLINIC | Age: 66
End: 2021-10-12
Payer: COMMERCIAL

## 2021-10-12 LAB
BACTERIA UR QL AUTO: NORMAL /HPF
BILIRUB UR QL STRIP: NEGATIVE
CLARITY UR: CLEAR
COLOR UR: YELLOW
GLUCOSE UR STRIP-MCNC: NEGATIVE MG/DL
HGB UR QL STRIP.AUTO: NEGATIVE
HYALINE CASTS #/AREA URNS LPF: NORMAL /LPF
KETONES UR STRIP-MCNC: NEGATIVE MG/DL
LEUKOCYTE ESTERASE UR QL STRIP: ABNORMAL
NITRITE UR QL STRIP: NEGATIVE
NON-SQ EPI CELLS URNS QL MICRO: NORMAL /HPF
PH UR STRIP.AUTO: 7 [PH]
PROT UR STRIP-MCNC: NEGATIVE MG/DL
RBC #/AREA URNS AUTO: NORMAL /HPF
SP GR UR STRIP.AUTO: 1.01 (ref 1–1.03)
UROBILINOGEN UR QL STRIP.AUTO: 0.2 E.U./DL
WBC #/AREA URNS AUTO: NORMAL /HPF

## 2021-10-12 PROCEDURE — 81001 URINALYSIS AUTO W/SCOPE: CPT | Performed by: FAMILY MEDICINE

## 2021-11-09 ENCOUNTER — APPOINTMENT (OUTPATIENT)
Dept: RADIOLOGY | Facility: MEDICAL CENTER | Age: 66
End: 2021-11-09
Payer: COMMERCIAL

## 2021-11-09 DIAGNOSIS — M25.511 RIGHT SHOULDER PAIN, UNSPECIFIED CHRONICITY: ICD-10-CM

## 2021-11-09 DIAGNOSIS — M25.512 LEFT SHOULDER PAIN, UNSPECIFIED CHRONICITY: ICD-10-CM

## 2021-11-09 PROCEDURE — 73030 X-RAY EXAM OF SHOULDER: CPT

## 2021-11-19 ENCOUNTER — IMMUNIZATIONS (OUTPATIENT)
Dept: FAMILY MEDICINE CLINIC | Facility: CLINIC | Age: 66
End: 2021-11-19
Payer: COMMERCIAL

## 2021-11-19 ENCOUNTER — TELEPHONE (OUTPATIENT)
Dept: FAMILY MEDICINE CLINIC | Facility: CLINIC | Age: 66
End: 2021-11-19

## 2021-11-19 DIAGNOSIS — G89.29 CHRONIC PAIN OF BOTH SHOULDERS: Primary | ICD-10-CM

## 2021-11-19 DIAGNOSIS — Z23 NEED FOR INFLUENZA VACCINATION: ICD-10-CM

## 2021-11-19 DIAGNOSIS — M25.512 CHRONIC PAIN OF BOTH SHOULDERS: Primary | ICD-10-CM

## 2021-11-19 DIAGNOSIS — M25.511 CHRONIC PAIN OF BOTH SHOULDERS: Primary | ICD-10-CM

## 2021-11-19 DIAGNOSIS — S49.91XA RIGHT SHOULDER INJURY, INITIAL ENCOUNTER: Primary | ICD-10-CM

## 2021-11-19 PROCEDURE — 90682 RIV4 VACC RECOMBINANT DNA IM: CPT

## 2021-11-19 PROCEDURE — 90471 IMMUNIZATION ADMIN: CPT

## 2021-12-08 ENCOUNTER — APPOINTMENT (OUTPATIENT)
Dept: LAB | Facility: CLINIC | Age: 66
End: 2021-12-08
Payer: COMMERCIAL

## 2021-12-08 DIAGNOSIS — E78.00 HYPERCHOLESTEROLEMIA: ICD-10-CM

## 2021-12-08 DIAGNOSIS — E03.9 ACQUIRED HYPOTHYROIDISM: ICD-10-CM

## 2021-12-08 DIAGNOSIS — M85.852 OSTEOPENIA OF LEFT HIP: ICD-10-CM

## 2021-12-08 DIAGNOSIS — R73.9 HYPERGLYCEMIA: ICD-10-CM

## 2021-12-08 DIAGNOSIS — E55.9 VITAMIN D DEFICIENCY: ICD-10-CM

## 2021-12-08 LAB
25(OH)D3 SERPL-MCNC: 31.3 NG/ML (ref 30–100)
ALBUMIN SERPL BCP-MCNC: 3.7 G/DL (ref 3.5–5)
ALP SERPL-CCNC: 73 U/L (ref 46–116)
ALT SERPL W P-5'-P-CCNC: 38 U/L (ref 12–78)
ANION GAP SERPL CALCULATED.3IONS-SCNC: 5 MMOL/L (ref 4–13)
AST SERPL W P-5'-P-CCNC: 22 U/L (ref 5–45)
BASOPHILS # BLD AUTO: 0.02 THOUSANDS/ΜL (ref 0–0.1)
BASOPHILS NFR BLD AUTO: 1 % (ref 0–1)
BILIRUB SERPL-MCNC: 0.79 MG/DL (ref 0.2–1)
BUN SERPL-MCNC: 13 MG/DL (ref 5–25)
CALCIUM SERPL-MCNC: 8.8 MG/DL (ref 8.3–10.1)
CHLORIDE SERPL-SCNC: 105 MMOL/L (ref 100–108)
CHOLEST SERPL-MCNC: 215 MG/DL
CO2 SERPL-SCNC: 28 MMOL/L (ref 21–32)
CREAT SERPL-MCNC: 0.66 MG/DL (ref 0.6–1.3)
EOSINOPHIL # BLD AUTO: 0.05 THOUSAND/ΜL (ref 0–0.61)
EOSINOPHIL NFR BLD AUTO: 1 % (ref 0–6)
ERYTHROCYTE [DISTWIDTH] IN BLOOD BY AUTOMATED COUNT: 12.1 % (ref 11.6–15.1)
EST. AVERAGE GLUCOSE BLD GHB EST-MCNC: 111 MG/DL
GFR SERPL CREATININE-BSD FRML MDRD: 94 ML/MIN/1.73SQ M
GLUCOSE P FAST SERPL-MCNC: 86 MG/DL (ref 65–99)
HBA1C MFR BLD: 5.5 %
HCT VFR BLD AUTO: 40.2 % (ref 34.8–46.1)
HDLC SERPL-MCNC: 51 MG/DL
HGB BLD-MCNC: 12.9 G/DL (ref 11.5–15.4)
IMM GRANULOCYTES # BLD AUTO: 0.02 THOUSAND/UL (ref 0–0.2)
IMM GRANULOCYTES NFR BLD AUTO: 1 % (ref 0–2)
LDLC SERPL CALC-MCNC: 124 MG/DL (ref 0–100)
LYMPHOCYTES # BLD AUTO: 1.88 THOUSANDS/ΜL (ref 0.6–4.47)
LYMPHOCYTES NFR BLD AUTO: 43 % (ref 14–44)
MCH RBC QN AUTO: 31.9 PG (ref 26.8–34.3)
MCHC RBC AUTO-ENTMCNC: 32.1 G/DL (ref 31.4–37.4)
MCV RBC AUTO: 99 FL (ref 82–98)
MONOCYTES # BLD AUTO: 0.36 THOUSAND/ΜL (ref 0.17–1.22)
MONOCYTES NFR BLD AUTO: 8 % (ref 4–12)
NEUTROPHILS # BLD AUTO: 2.04 THOUSANDS/ΜL (ref 1.85–7.62)
NEUTS SEG NFR BLD AUTO: 46 % (ref 43–75)
NRBC BLD AUTO-RTO: 0 /100 WBCS
PLATELET # BLD AUTO: 263 THOUSANDS/UL (ref 149–390)
PMV BLD AUTO: 10.1 FL (ref 8.9–12.7)
POTASSIUM SERPL-SCNC: 3.7 MMOL/L (ref 3.5–5.3)
PROT SERPL-MCNC: 7.1 G/DL (ref 6.4–8.2)
RBC # BLD AUTO: 4.05 MILLION/UL (ref 3.81–5.12)
SODIUM SERPL-SCNC: 138 MMOL/L (ref 136–145)
TRIGL SERPL-MCNC: 199 MG/DL
TSH SERPL DL<=0.05 MIU/L-ACNC: 1.57 UIU/ML (ref 0.36–3.74)
WBC # BLD AUTO: 4.37 THOUSAND/UL (ref 4.31–10.16)

## 2021-12-08 PROCEDURE — 82306 VITAMIN D 25 HYDROXY: CPT

## 2021-12-08 PROCEDURE — 84443 ASSAY THYROID STIM HORMONE: CPT

## 2021-12-08 PROCEDURE — 36415 COLL VENOUS BLD VENIPUNCTURE: CPT

## 2021-12-08 PROCEDURE — 83036 HEMOGLOBIN GLYCOSYLATED A1C: CPT

## 2021-12-08 PROCEDURE — 80061 LIPID PANEL: CPT

## 2021-12-08 PROCEDURE — 85025 COMPLETE CBC W/AUTO DIFF WBC: CPT

## 2021-12-08 PROCEDURE — 80053 COMPREHEN METABOLIC PANEL: CPT

## 2021-12-10 ENCOUNTER — OFFICE VISIT (OUTPATIENT)
Dept: FAMILY MEDICINE CLINIC | Facility: CLINIC | Age: 66
End: 2021-12-10
Payer: COMMERCIAL

## 2021-12-10 VITALS
SYSTOLIC BLOOD PRESSURE: 128 MMHG | HEART RATE: 56 BPM | OXYGEN SATURATION: 99 % | WEIGHT: 139 LBS | BODY MASS INDEX: 25.58 KG/M2 | DIASTOLIC BLOOD PRESSURE: 80 MMHG | HEIGHT: 62 IN | RESPIRATION RATE: 18 BRPM

## 2021-12-10 DIAGNOSIS — R13.19 ESOPHAGEAL DYSPHAGIA: ICD-10-CM

## 2021-12-10 DIAGNOSIS — Z12.31 ENCOUNTER FOR SCREENING MAMMOGRAM FOR BREAST CANCER: ICD-10-CM

## 2021-12-10 DIAGNOSIS — N39.0 RECURRENT UTI: ICD-10-CM

## 2021-12-10 DIAGNOSIS — R73.9 HYPERGLYCEMIA: ICD-10-CM

## 2021-12-10 DIAGNOSIS — E78.00 HYPERCHOLESTEROLEMIA: ICD-10-CM

## 2021-12-10 DIAGNOSIS — R31.0 GROSS HEMATURIA: ICD-10-CM

## 2021-12-10 DIAGNOSIS — E66.3 OVERWEIGHT (BMI 25.0-29.9): ICD-10-CM

## 2021-12-10 DIAGNOSIS — E03.9 ACQUIRED HYPOTHYROIDISM: Primary | ICD-10-CM

## 2021-12-10 DIAGNOSIS — E55.9 VITAMIN D DEFICIENCY: ICD-10-CM

## 2021-12-10 DIAGNOSIS — K59.09 CHRONIC CONSTIPATION: ICD-10-CM

## 2021-12-10 DIAGNOSIS — M75.81 TENDINITIS OF RIGHT ROTATOR CUFF: ICD-10-CM

## 2021-12-10 PROBLEM — M25.511 CHRONIC PAIN OF BOTH SHOULDERS: Status: RESOLVED | Noted: 2021-11-19 | Resolved: 2021-12-10

## 2021-12-10 PROBLEM — M25.512 CHRONIC PAIN OF BOTH SHOULDERS: Status: RESOLVED | Noted: 2021-11-19 | Resolved: 2021-12-10

## 2021-12-10 PROBLEM — G89.29 CHRONIC PAIN OF BOTH SHOULDERS: Status: RESOLVED | Noted: 2021-11-19 | Resolved: 2021-12-10

## 2021-12-10 PROCEDURE — 3725F SCREEN DEPRESSION PERFORMED: CPT | Performed by: FAMILY MEDICINE

## 2021-12-10 PROCEDURE — 3008F BODY MASS INDEX DOCD: CPT | Performed by: FAMILY MEDICINE

## 2021-12-10 PROCEDURE — 1036F TOBACCO NON-USER: CPT | Performed by: FAMILY MEDICINE

## 2021-12-10 PROCEDURE — 99214 OFFICE O/P EST MOD 30 MIN: CPT | Performed by: FAMILY MEDICINE

## 2022-01-11 ENCOUNTER — CONSULT (OUTPATIENT)
Dept: OBGYN CLINIC | Facility: MEDICAL CENTER | Age: 67
End: 2022-01-11
Payer: COMMERCIAL

## 2022-01-11 VITALS
HEART RATE: 69 BPM | HEIGHT: 62 IN | SYSTOLIC BLOOD PRESSURE: 116 MMHG | BODY MASS INDEX: 25.58 KG/M2 | WEIGHT: 139 LBS | DIASTOLIC BLOOD PRESSURE: 82 MMHG | TEMPERATURE: 97.4 F

## 2022-01-11 DIAGNOSIS — M25.512 CHRONIC PAIN OF BOTH SHOULDERS: ICD-10-CM

## 2022-01-11 DIAGNOSIS — M75.81 RIGHT ROTATOR CUFF TENDONITIS: Primary | ICD-10-CM

## 2022-01-11 DIAGNOSIS — G89.29 CHRONIC PAIN OF BOTH SHOULDERS: ICD-10-CM

## 2022-01-11 DIAGNOSIS — M25.511 CHRONIC PAIN OF BOTH SHOULDERS: ICD-10-CM

## 2022-01-11 PROCEDURE — 99244 OFF/OP CNSLTJ NEW/EST MOD 40: CPT | Performed by: ORTHOPAEDIC SURGERY

## 2022-01-11 NOTE — PROGRESS NOTES
Orthopaedic Surgery - Office Note  Liz Perez (6 y o  female)   : 1955   MRN: [id number]  Encounter Date: 2022    Chief Complaint   Patient presents with    Right Shoulder - Pain       Assessment / Plan  Right rotator cuff tendinitis     · Begin outpatient PT for Right rotator cuff tendonitis   · Anti-inflammatories or Tylenol prn pain  · ice 20 minutes as a time for pain management   · Discussed possibilty of MRI if unsucessful with PT or symptoms worsen   Return in about 6 weeks (around 2022) for Recheck  History of Present Illness  Liz Perez is a 61 y o  female who presents today for initial evaluation right shoulder pain referred by her PCP  She states that her pain has been ongoing for 6 months now and has slowly gotten worse  She states that she feels like the initial injury occurred when she was lifting recycled and overhead around that time  She states she has constant achy pain that progresses sharp with certain movements  She is experiencing pain in the anterior aspect of her shoulder that runs down the lateral side  She is currently managing her pain with ibuprofen as needed which provides minimal relief  She states that she does experience occasional night pain and difficulty sleeping at night as well as other complications and issues with activities daily living  She has no previous history of a cortisone injection or attempted formal physical therapy  Review of Systems  Pertinent items are noted in HPI  All other systems were reviewed and are negative  Physical Exam  /82   Pulse 69   Temp (!) 97 4 °F (36 3 °C)   Ht 5' 2" (1 575 m)   Wt 63 kg (139 lb)   BMI 25 42 kg/m²   Cons: Appears well  No apparent distress  Psych: Alert  Oriented x3  Mood and affect normal   Eyes: PERRLA, EOMI  Resp: Normal effort  No audible wheezing or stridor  CV: Palpable pulse  No discernable arrhythmia  No LE edema    Lymph:  No palpable cervical, axillary, or inguinal lymphadenopathy  Skin: Warm  No palpable masses  No visible lesions  Neuro: Normal muscle tone  Normal and symmetric DTR's  Right Shoulder Exam  Alignment / Posture:  Normal shoulder posture  Inspection:  No swelling  No edema  No erythema  No ecchymosis  Palpation:  Mild bursal tenderness  No effusion  No clicking, catching, or snapping  ROM:  Shoulder   Shoulder ER 70  Shoulder IR T10  Strength:  Rotator cuff 4+/5  Stability:  No objective shoulder instability  Tests: (+) Poole  (+) Neer  (-) Belly press  Neurovascular:  Sensation intact in Ax/R/M/U nerve distributions  2+ radial pulse  Studies Reviewed  I have personally reviewed pertinent films in PACS  XR of right shoulder - Performed on 11/09/2021 reveal no acute osseousabnormality  Mild glenohumeral and AC joint osteoarthritis  Procedures  No procedures today  Medical, Surgical, Family, and Social History  The patient's medical history, family history, and social history, were reviewed and updated as appropriate  Past Medical History:   Diagnosis Date    Anxiety     last assessed 12/1/14, resolved 2/1/16    Closed displaced fracture of proximal phalanx of lesser toe of right foot 9/11/2020    Disease of thyroid gland     Headache syndrome 5/12/2017    Hematuria     last assessed 4/23/15, resolved 2/1/16    Left breast mass 12/18/2019     Questionable mass on exam 2019 May be patch of more cystic breast tissue, not well defined   Will check dx mammo  Mammogram was normal 9/20    PONV (postoperative nausea and vomiting)     Skin neoplasm     last assessed 9/18/15, resolved 2/1/16       Past Surgical History:   Procedure Laterality Date    COLONOSCOPY      HYSTEROSCOPY WITH  RESECTION UTERINE TUMOR/FIBROID  03/2020    SHOULDER SURGERY Left 2009    tear repair    TOOTH EXTRACTION         Family History   Problem Relation Age of Onset    Hyperlipidemia Mother     Cancer Father         of spine    Stroke Father         syndrome     No Known Problems Sister     Breast cancer Maternal Aunt 72    No Known Problems Maternal Aunt        Social History     Occupational History    Not on file   Tobacco Use    Smoking status: Former Smoker     Types: Cigarettes     Quit date: 1990     Years since quittin 0    Smokeless tobacco: Never Used   Substance and Sexual Activity    Alcohol use:  Yes     Alcohol/week: 5 0 standard drinks     Types: 5 Glasses of wine per week     Comment: occasionally / social     Drug use: No    Sexual activity: Yes     Partners: Male       Allergies   Allergen Reactions    Sulfa Antibiotics Rash         Current Outpatient Medications:     Cholecalciferol (Vitamin D3) 50 MCG (2000 UT) capsule, Take 1 capsule (2,000 Units total) by mouth daily, Disp: , Rfl: 0    estradiol (ESTRACE) 0 1 mg/g vaginal cream, Has on hand, has not started yet (2021), Disp: , Rfl:     ibuprofen (MOTRIN) 200 mg tablet, Take 200 mg by mouth every 6 (six) hours as needed, Disp: , Rfl:     levothyroxine 75 mcg tablet, Take 1 tablet (75 mcg total) by mouth daily, Disp: 30 tablet, Rfl: 11    mometasone (ELOCON) 0 1 % lotion, Apply topically daily To affected ear canal, Disp: 60 mL, Rfl: 0    fluticasone (FLONASE) 50 mcg/act nasal spray, 1 spray into each nostril, Disp: , Rfl:       Deborah Douglas    Scribe Attestation    I,:  Deborah Douglas am acting as a scribe while in the presence of the attending physician :       I,:  Yoanna Chaney MD personally performed the services described in this documentation    as scribed in my presence :

## 2022-01-13 ENCOUNTER — HOSPITAL ENCOUNTER (OUTPATIENT)
Dept: ULTRASOUND IMAGING | Facility: MEDICAL CENTER | Age: 67
Discharge: HOME | End: 2022-01-13
Payer: COMMERCIAL

## 2022-01-13 ENCOUNTER — HOSPITAL ENCOUNTER (OUTPATIENT)
Dept: MAMMOGRAPHY | Facility: MEDICAL CENTER | Age: 67
Discharge: HOME | End: 2022-01-13
Payer: COMMERCIAL

## 2022-01-13 ENCOUNTER — EVALUATION (OUTPATIENT)
Dept: PHYSICAL THERAPY | Facility: MEDICAL CENTER | Age: 67
End: 2022-01-13
Payer: COMMERCIAL

## 2022-01-13 VITALS — BODY MASS INDEX: 25.56 KG/M2 | HEIGHT: 62 IN | WEIGHT: 138.89 LBS

## 2022-01-13 DIAGNOSIS — G89.29 CHRONIC RIGHT SHOULDER PAIN: ICD-10-CM

## 2022-01-13 DIAGNOSIS — R31.0 GROSS HEMATURIA: ICD-10-CM

## 2022-01-13 DIAGNOSIS — M75.81 TENDINITIS OF RIGHT ROTATOR CUFF: Primary | ICD-10-CM

## 2022-01-13 DIAGNOSIS — Z12.31 ENCOUNTER FOR SCREENING MAMMOGRAM FOR BREAST CANCER: ICD-10-CM

## 2022-01-13 DIAGNOSIS — N39.0 RECURRENT UTI: ICD-10-CM

## 2022-01-13 DIAGNOSIS — M75.81 RIGHT ROTATOR CUFF TENDONITIS: ICD-10-CM

## 2022-01-13 DIAGNOSIS — M25.511 CHRONIC RIGHT SHOULDER PAIN: ICD-10-CM

## 2022-01-13 PROCEDURE — 97110 THERAPEUTIC EXERCISES: CPT

## 2022-01-13 PROCEDURE — 76770 US EXAM ABDO BACK WALL COMP: CPT

## 2022-01-13 PROCEDURE — 77067 SCR MAMMO BI INCL CAD: CPT

## 2022-01-13 PROCEDURE — 97161 PT EVAL LOW COMPLEX 20 MIN: CPT

## 2022-01-13 PROCEDURE — 77063 BREAST TOMOSYNTHESIS BI: CPT

## 2022-01-13 NOTE — PROGRESS NOTES
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, shoulder hike with elevation, poor motor control of scapula with movement, limited ROM, pain with functional activities, and strength deficit, which limit her ability to perform ADLs, IADLs and recreational activities  No referral is necessary at this based on examination results  The patients biggest concern is that her strength and mobility would be permanently limited    Problem List:  1) GHJ hypomobility- addressing with manual interventions and ROM exercises  2) poor posture- addressing with postural retraining and strengthening   3) upper cross syndrome- addressing with stretching and strengthening  4) neuromotor control deficit of scapula- addressing with NMRE    Etiologic factors include traumatic incident a few months ago  Pt  will benefit from skilled PT services that includes manual therapy techniques to enhance tissue extensibility, neuromuscular re-education to facilitate motor control, therapeutic exercise to increase functional mobility, and modalities prn to reduce pain and inflammation  Impairments: abnormal or restricted ROM, abnormal movement, impaired physical strength, lacks appropriate home exercise program, pain with function, scapular dyskinesis and poor posture   Understanding of Dx/Px/POC: good   Prognosis: good  Prognosis details: Positive prognostic indicators include positive attitude toward recovery    Negative prognostic indicators include chronicity of symptoms    Goals  Patient will be independent with home exercise program    Patient will be able to manage symptoms independently  Patient will be to reach behind her back to take her bra off  Patient will be able to return to her upper body exercise routine  Patient will be able to return to painfree ADLs and IADLs   Patient will be able to return to hiking with no pain or restrictions    Plan  Plan details: Prognosis above is given PT services 2x/week tapering to 1x/week over the next 6-8 weeks and home program adherence  Planned modality interventions: cryotherapy, low level laser therapy, TENS and hydrotherapy  Planned therapy interventions: joint mobilization, manual therapy, massage, neuromuscular re-education, patient education, postural training, strengthening, stretching, therapeutic activities, therapeutic exercise, graded exercise, functional ROM exercises, home exercise program and flexibility  Treatment plan discussed with: patient        Subjective Evaluation    History of Present Illness  Onset date: >6 months ago  Mechanism of injury: Pt reports that she feels like she had a sharp pain >6 months ago when she was reaching for a lid of the garbage bin  Her reflexes caused her to try to catch it  The pain and her function has progressively gotten worse in the past 3-4 months  She has pain with lifting, throwing, reaching her behind her back (which is the worst thing), and washing her back  She feels that she is weaker and is not using is at much  The patients biggest concern is that her strength and mobility would be permanently limited  Pt denies N/T on R, but she gets it in her L arm  Pt reports an incident in 2021 where she slipped and fell on the ice and braced herself with her arm  She had no arm pain, only tailbone pain             Not a recurrent problem   Pain  Current pain ratin  At best pain ratin  At worst pain ratin  Location: anterolateral shoulder  Quality: sharp, tight, pulling and throbbing  Relieving factors: rest  Aggravating factors: overhead activity    Hand dominance: right      Diagnostic Tests  X-ray: normal (OA)  Patient Goals  Patient goals for therapy: independence with ADLs/IADLs, return to sport/leisure activities, increased strength and decreased pain  Patient goal: hiking, take off her bra, upper body exercise        Objective     Postural Observations  Seated posture: poor  Standing posture: fair  Correction of posture: makes symptoms better        Palpation   Left   Hypertonic in the upper trapezius  Tenderness of the upper trapezius  Right   Hypertonic in the upper trapezius  Tenderness of the upper trapezius  Cervical/Thoracic Screen   Cervical range of motion within normal limits with the following exceptions: Limited ROT b/l   Thoracic range of motion within normal limits with the following exceptions: Limited thoracic extension    Active Range of Motion   Left Shoulder   Normal active range of motion  Flexion: 180 degrees   Abduction: 180 degrees   External rotation BTH: C7   Internal rotation BTB: L1     Right Shoulder   Flexion: 160 degrees with pain  Abduction: 160 degrees with pain  External rotation BTH: C4 with pain  Internal rotation BTB: sacrum with pain    Passive Range of Motion   Left Shoulder   Normal passive range of motion    Right Shoulder   Normal passive range of motion  Flexion: with pain  Abduction: with pain  External rotation 0°: with pain  Internal rotation 0°: with pain    Strength/Myotome Testing     Left Shoulder   Normal muscle strength    Right Shoulder     Planes of Motion   Flexion: 4-   Abduction: 4-   External rotation at 0°: 4-   Internal rotation at 0°: 4     Tests     Right Shoulder   Positive Hawkin's, Neer's and painful arc  Negative drop arm and external rotation lag sign                Precautions:     HEP: scap 4, thoracic ext  Manuals 1/13 Neuro Re-Ed                                                                                                        Ther Ex                                                                                                        HEP review and pt education 10'            Ther Activity                                       Gait Training                                       Modalities

## 2022-01-18 ENCOUNTER — OFFICE VISIT (OUTPATIENT)
Dept: PHYSICAL THERAPY | Facility: MEDICAL CENTER | Age: 67
End: 2022-01-18
Payer: COMMERCIAL

## 2022-01-18 DIAGNOSIS — M75.81 TENDINITIS OF RIGHT ROTATOR CUFF: Primary | ICD-10-CM

## 2022-01-18 DIAGNOSIS — M75.81 RIGHT ROTATOR CUFF TENDONITIS: ICD-10-CM

## 2022-01-18 DIAGNOSIS — M25.511 CHRONIC RIGHT SHOULDER PAIN: ICD-10-CM

## 2022-01-18 DIAGNOSIS — G89.29 CHRONIC RIGHT SHOULDER PAIN: ICD-10-CM

## 2022-01-18 PROCEDURE — 97112 NEUROMUSCULAR REEDUCATION: CPT

## 2022-01-18 PROCEDURE — 97110 THERAPEUTIC EXERCISES: CPT

## 2022-01-18 PROCEDURE — 97140 MANUAL THERAPY 1/> REGIONS: CPT

## 2022-01-18 NOTE — PROGRESS NOTES
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  Objective: See treatment diary below      Assessment: POC initiated  Pt had some relief with elevation following manual interventions  Tolerated treatment well  Patient demonstrated fatigue post treatment, exhibited good technique with therapeutic exercises and would benefit from continued PT to address remaining deficits and return to PLOF  Plan: Continue per plan of care        Precautions:     HEP: scap 4, thoracic ext  Manuals            GHJ mobs  JH inf, AP gr 3-4           Thoracic mobs  PA gr 4           Post capsule stretch                                       Neuro Re-Ed             scap 4  YTB 3x10           Band pull aparts  2x10 YTB           No monies  2x10 YTB           Prone row, I , T, Y  Row, I 2x10                                                  Ther Ex             UBE  3' fwd, 3' bwd           IR strap stretch  2'           ER/IR                                                                 HEP review and pt education 10'            Ther Activity                                       Gait Training                                       Modalities

## 2022-01-21 ENCOUNTER — OFFICE VISIT (OUTPATIENT)
Dept: PHYSICAL THERAPY | Facility: MEDICAL CENTER | Age: 67
End: 2022-01-21
Payer: COMMERCIAL

## 2022-01-21 DIAGNOSIS — M75.81 TENDINITIS OF RIGHT ROTATOR CUFF: Primary | ICD-10-CM

## 2022-01-21 DIAGNOSIS — G89.29 CHRONIC RIGHT SHOULDER PAIN: ICD-10-CM

## 2022-01-21 DIAGNOSIS — M25.511 CHRONIC RIGHT SHOULDER PAIN: ICD-10-CM

## 2022-01-21 DIAGNOSIS — M75.81 RIGHT ROTATOR CUFF TENDONITIS: ICD-10-CM

## 2022-01-21 PROCEDURE — 97112 NEUROMUSCULAR REEDUCATION: CPT

## 2022-01-21 PROCEDURE — 97110 THERAPEUTIC EXERCISES: CPT

## 2022-01-21 PROCEDURE — 97140 MANUAL THERAPY 1/> REGIONS: CPT

## 2022-01-21 NOTE — PROGRESS NOTES
Daily Note     Today's date: 2022  Patient name: Víctor Decker  : 1955  MRN: [id number]  Referring provider: Radha Samuel, *  Dx:   Encounter Diagnosis     ICD-10-CM    1  Tendinitis of right rotator cuff  M75 81    2  Chronic right shoulder pain  M25 511     G89 29    3  Right rotator cuff tendonitis  M75 81                   Subjective: Pt reports that after last session should could feel her shoulder  She reports that it bothered her for about a day  She had to shovel again, which irritated her a bit  Objective: See treatment diary below      Assessment: Pt had soft tissue restrictions of subscap and lat, which was treated with manual interventions  She had improvement in ROM and pain after intervention  Tolerated treatment well  Patient demonstrated fatigue post treatment, exhibited good technique with therapeutic exercises and would benefit from continued PT to address remaining deficits and return to PLOF  Plan: Continue per plan of care        Precautions:     HEP: scap 4, thoracic ext  Manuals           GHJ mobs  JH inf, AP gr 3-4 JH inf, AP gr 3-4          Thoracic mobs  PA gr 4 PA gr 4          Lat release   JH          STM   subscap JH          Post capsule stretch                                       Neuro Re-Ed             scap 4  YTB 3x10 RTB 2x15          Band pull aparts  2x10 YTB 3x10 YTB          No monies  2x10 YTB 3x10 YTB          Prone row, I , T, Y  Row, I 2x10 Row, I 2x10                                                 Ther Ex             UBE  3' fwd, 3' bwd 3' fwd, 3' bwd          IR strap stretch  2' 2'          ER/IR                                                                 HEP review and pt education 10'            Ther Activity                                       Gait Training                                       Modalities

## 2022-01-25 ENCOUNTER — OFFICE VISIT (OUTPATIENT)
Dept: PHYSICAL THERAPY | Facility: MEDICAL CENTER | Age: 67
End: 2022-01-25
Payer: COMMERCIAL

## 2022-01-25 DIAGNOSIS — M25.511 CHRONIC RIGHT SHOULDER PAIN: ICD-10-CM

## 2022-01-25 DIAGNOSIS — M75.81 TENDINITIS OF RIGHT ROTATOR CUFF: Primary | ICD-10-CM

## 2022-01-25 DIAGNOSIS — M75.81 RIGHT ROTATOR CUFF TENDONITIS: ICD-10-CM

## 2022-01-25 DIAGNOSIS — G89.29 CHRONIC RIGHT SHOULDER PAIN: ICD-10-CM

## 2022-01-25 PROCEDURE — 97110 THERAPEUTIC EXERCISES: CPT

## 2022-01-25 PROCEDURE — 97112 NEUROMUSCULAR REEDUCATION: CPT

## 2022-01-25 PROCEDURE — 97140 MANUAL THERAPY 1/> REGIONS: CPT

## 2022-01-25 NOTE — PROGRESS NOTES
Daily Note     Today's date: 2022  Patient name: Avinash Oleary  : 1955  MRN: [id number]  Referring provider: Ciaran Vazquez, *  Dx:   Encounter Diagnosis     ICD-10-CM    1  Tendinitis of right rotator cuff  M75 81    2  Chronic right shoulder pain  M25 511     G89 29    3  Right rotator cuff tendonitis  M75 81                   Subjective: Pt reports that she feels the same, no better no worse  Objective: See treatment diary below      Assessment: Tolerated treatment well  Focused on thoracic mobility and 1st rib mobility  Improvement in IR followed manual interventions  Patient demonstrated fatigue post treatment, exhibited good technique with therapeutic exercises and would benefit from continued PT to address remaining deficits and return to PLOF  Plan: Continue per plan of care        Precautions:     HEP: scap 4, thoracic ext, 1st rib mob, IR stretch  Manuals          GHJ mobs  JH inf, AP gr 3-4 JH inf, AP gr 3-4          Thoracic mobs  PA gr 4 PA gr 4 Pa gr 4         Lat release   JH          STM   subscap JH          scap mobs    JH         Post capsule stretch             1st rib mob    nv                      Neuro Re-Ed             scap 4  YTB 3x10 RTB 2x15          Band pull aparts  2x10 YTB 3x10 YTB 3x10 YTB         No monies  2x10 YTB 3x10 YTB 3x10 YTB         Prone row, I , T, Y  Row, I 2x10 Row, I 2x10                                                 Ther Ex             UBE  3' fwd, 3' bwd 3' fwd, 3' bwd 3' fwd, 3' bwd         IR strap stretch  2' 2'          ER/IR             Open books    3'         Thoracic ext over pillow    2'         IR stretch at wall/1st rib mob    2'                      HEP review and pt education 10'            Ther Activity                                       Gait Training                                       Modalities

## 2022-01-27 ENCOUNTER — OFFICE VISIT (OUTPATIENT)
Dept: PHYSICAL THERAPY | Facility: MEDICAL CENTER | Age: 67
End: 2022-01-27
Payer: COMMERCIAL

## 2022-01-27 ENCOUNTER — OFFICE VISIT (OUTPATIENT)
Dept: UROLOGY | Facility: CLINIC | Age: 67
End: 2022-01-27
Payer: COMMERCIAL

## 2022-01-27 VITALS
DIASTOLIC BLOOD PRESSURE: 70 MMHG | WEIGHT: 140 LBS | SYSTOLIC BLOOD PRESSURE: 118 MMHG | BODY MASS INDEX: 25.76 KG/M2 | HEIGHT: 62 IN | HEART RATE: 76 BPM

## 2022-01-27 DIAGNOSIS — M75.81 TENDINITIS OF RIGHT ROTATOR CUFF: Primary | ICD-10-CM

## 2022-01-27 DIAGNOSIS — N39.0 RECURRENT UTI: Primary | ICD-10-CM

## 2022-01-27 DIAGNOSIS — M75.81 RIGHT ROTATOR CUFF TENDONITIS: ICD-10-CM

## 2022-01-27 DIAGNOSIS — G89.29 CHRONIC RIGHT SHOULDER PAIN: ICD-10-CM

## 2022-01-27 DIAGNOSIS — R31.0 GROSS HEMATURIA: ICD-10-CM

## 2022-01-27 DIAGNOSIS — M25.511 CHRONIC RIGHT SHOULDER PAIN: ICD-10-CM

## 2022-01-27 LAB
SL AMB  POCT GLUCOSE, UA: NORMAL
SL AMB LEUKOCYTE ESTERASE,UA: NORMAL
SL AMB POCT BILIRUBIN,UA: NORMAL
SL AMB POCT BLOOD,UA: NORMAL
SL AMB POCT CLARITY,UA: CLEAR
SL AMB POCT COLOR,UA: YELLOW
SL AMB POCT KETONES,UA: NORMAL
SL AMB POCT NITRITE,UA: NORMAL
SL AMB POCT PH,UA: 6.5
SL AMB POCT SPECIFIC GRAVITY,UA: 1.01
SL AMB POCT URINE PROTEIN: NORMAL
SL AMB POCT UROBILINOGEN: 0.2

## 2022-01-27 PROCEDURE — 97112 NEUROMUSCULAR REEDUCATION: CPT

## 2022-01-27 PROCEDURE — 97140 MANUAL THERAPY 1/> REGIONS: CPT

## 2022-01-27 PROCEDURE — 99244 OFF/OP CNSLTJ NEW/EST MOD 40: CPT | Performed by: PHYSICIAN ASSISTANT

## 2022-01-27 PROCEDURE — 81002 URINALYSIS NONAUTO W/O SCOPE: CPT | Performed by: PHYSICIAN ASSISTANT

## 2022-01-27 PROCEDURE — 1036F TOBACCO NON-USER: CPT | Performed by: PHYSICIAN ASSISTANT

## 2022-01-27 PROCEDURE — 97110 THERAPEUTIC EXERCISES: CPT

## 2022-01-27 PROCEDURE — 3008F BODY MASS INDEX DOCD: CPT | Performed by: PHYSICIAN ASSISTANT

## 2022-01-27 NOTE — PROGRESS NOTES
1/27/2022      Chief Complaint   Patient presents with   [place] New Patient Visit    Gross Hematuria    recurring UTI         Assessment and Plan    61 y o  female managed by NEW PATIENT    1  Gross hematuria- painless  - US k/b normal  - schedule cystoscopy    Because she has had several episodes of microscopic hematuria and more recently episode of gross hematuria without other classic uti findings or bacteriuria I do recommend a formal hematuria workup  She agrees and will return for cystoscopy in the coming weeks  2  Frequent UTI  - no recent symptoms  - episode of gross hematuria not associated with any UTI symptoms at the time    We discussed ways to help to prevent urinary tract infections  Good fluid hydration, control of bowel habits with avoidance of constipation and diarrhea, reviewed appropriate bladder and sexual hygiene, 100% cranberry juice or cranberry supplement tabs, pro- or pre- biotics, and D-mannose (a supplement available OTC which reduces bacterial binding to the bladder wall) have all been shown to improve recurrent urinary infection  Cystex urinary health maintenance supplement was recommended which conveniently combines several of these agents in one dose  Postcoital antibiotic use, topical estrogen replacement, and suppressive antibiotic use were discussed including which situations these would be indicated  Pending benign hematuria workup she will start the vaginal estrace for vaginal dryness and UTI prevention  History of Present Illness  Anne Maguire is a 61 y o  female here for evaluation of gross hematuria and frequent UTI  Last episode of gross hematuria in September with a negative urine culture  Repeat UA few months later with no persistent microhematuria  No recent UTI  Does report 1-2 episodes per year for several years though none recently and no symptoms today  Screening renal/bladder US for that purpose was done last week and is normal including a pvr 58ml      New patient evaluation for urinary tract infection(s)  Childhood UTIs- no  Childhood potty training issues- no  UTIs in pregnancy- no  Postcoital UTI/trigger- yes    Constipation- no  Diarrhea- no  Incontinence/Pads- no    Typical symptoms at presentation- odd sensation or awareness of bladder and pelvis; no dysuria urgency or pain  Febrile UTIs- no  Hospitalized for UTIs- no  Improves with abx- sometimes    Cultures: 2016, 2019 with mixed contaminants  2020 with 50k staph, 2021 <10k mixed    GYN exams yearly or every other year are up to date  Dx with atrophic vaginitis and prescribed estrace last year but not yet tried it  Postmenopausal at least 10-15 yrs  FHx: Dad had renal cancer treated with nephrectomy  Son has a congenital pelvic kidney  Review of Systems   Constitutional: Negative for activity change, appetite change, chills, fever and unexpected weight change  HENT: Negative  Respiratory: Negative  Negative for shortness of breath  Cardiovascular: Negative  Negative for chest pain  Gastrointestinal: Negative for abdominal pain, diarrhea, nausea and vomiting  Endocrine: Negative  Genitourinary: Positive for dyspareunia and hematuria  Negative for decreased urine volume, difficulty urinating, dysuria, flank pain, frequency, pelvic pain, urgency, vaginal bleeding and vaginal pain  Musculoskeletal: Negative for back pain and gait problem  Skin: Negative  Allergic/Immunologic: Negative  Neurological: Negative  Hematological: Negative for adenopathy  Does not bruise/bleed easily  Vitals  Vitals:    01/27/22 1507   BP: 118/70   BP Location: Left arm   Patient Position: Sitting   Cuff Size: Adult   Pulse: 76   Weight: 63 5 kg (140 lb)   Height: 5' 2" (1 575 m)       Physical Exam  Vitals and nursing note reviewed  Constitutional:       General: She is not in acute distress  Appearance: She is well-developed  She is not diaphoretic     HENT:      Head: Normocephalic and atraumatic  Pulmonary:      Effort: Pulmonary effort is normal    Abdominal:      Palpations: There is no mass  Tenderness: There is no right CVA tenderness or left CVA tenderness  Skin:     General: Skin is warm and dry  Neurological:      Mental Status: She is alert and oriented to person, place, and time  Gait: Gait normal    Psychiatric:         Speech: Speech normal          Behavior: Behavior normal            Past History  Past Medical History:   Diagnosis Date    Anxiety     last assessed 14, resolved 16    Closed displaced fracture of proximal phalanx of lesser toe of right foot 2020    Disease of thyroid gland     Headache syndrome 2017    Hematuria     last assessed 4/23/15, resolved 16    Left breast mass 2019     Questionable mass on exam 2019 May be patch of more cystic breast tissue, not well defined  Will check dx mammo  Mammogram was normal     PONV (postoperative nausea and vomiting)     Skin neoplasm     last assessed 9/18/15, resolved 16     Social History     Socioeconomic History    Marital status:      Spouse name: None    Number of children: None    Years of education: None    Highest education level: None   Occupational History    None   Tobacco Use    Smoking status: Former Smoker     Types: Cigarettes     Quit date: 1990     Years since quittin 0    Smokeless tobacco: Never Used   Vaping Use    Vaping Use: Never used   Substance and Sexual Activity    Alcohol use:  Yes     Alcohol/week: 5 0 standard drinks     Types: 5 Glasses of wine per week     Comment: occasionally / social     Drug use: No    Sexual activity: Yes     Partners: Male   Other Topics Concern    None   Social History Narrative    None     Social Determinants of Health     Financial Resource Strain: Not on file   Food Insecurity: Not on file   Transportation Needs: Not on file   Physical Activity: Not on file Stress: Not on file   Social Connections: Not on file   Intimate Partner Violence: Not on file   Housing Stability: Not on file     Social History     Tobacco Use   Smoking Status Former Smoker    Types: Cigarettes    Quit date: 1990    Years since quittin 0   Smokeless Tobacco Never Used     Family History   Problem Relation Age of Onset    Hyperlipidemia Mother     Cancer Father         of spine    Stroke Father         syndrome     No Known Problems Sister     Breast cancer Maternal Aunt 72    No Known Problems Maternal Aunt        The following portions of the patient's history were reviewed and updated as appropriate: allergies, current medications, past medical history, past social history, past surgical history and problem list     Results  No results found for this or any previous visit (from the past 1 hour(s))  ]  No results found for: PSA  Lab Results   Component Value Date    GLUCOSE 98 2015    CALCIUM 8 8 2021     2015    K 3 7 2021    CO2 28 2021     2021    BUN 13 2021    CREATININE 0 66 2021     Lab Results   Component Value Date    WBC 4 37 2021    HGB 12 9 2021    HCT 40 2 2021    MCV 99 (H) 2021     2021

## 2022-01-27 NOTE — PROGRESS NOTES
Daily Note     Today's date: 2022  Patient name: Rachell Henry  : 1955  MRN: [id number]  Referring provider: Brisa Welsh, *  Dx:   Encounter Diagnosis     ICD-10-CM    1  Tendinitis of right rotator cuff  M75 81    2  Chronic right shoulder pain  M25 511     G89 29    3  Right rotator cuff tendonitis  M75 81                   Subjective: Pt reports that she has had no change  She reports that she did not have much pain the past few days  She reports that she reached the wrong way today and her shoulder is in more than usual pain  Objective: See treatment diary below      Assessment: Tolerated treatment well  Patient have improvement in elevation pain following manual interventions  Patient demonstrated fatigue post treatment, exhibited good technique with therapeutic exercises and would benefit from continued PT to address remaining deficits and return to PLOF  Plan: Continue per plan of care        Precautions:     HEP: scap 4, thoracic ext, 1st rib mob, IR stretch  Manuals         GHJ mobs  JH inf, AP gr 3-4 JH inf, AP gr 3-4          Thoracic mobs  PA gr 4 PA gr 4 Pa gr 4 PA gr 4        Lat release   JH          STM   subscap JH          scap mobs    [facility] NATHAL[facility] JH        Post capsule stretch             1st rib mob    nv [facility] NATHALY                     Neuro Re-Ed             scap 4  YTB 3x10 RTB 2x15          Band pull aparts  2x10 YTB 3x10 YTB 3x10 YTB 3x10 YTB        No monies  2x10 YTB 3x10 YTB 3x10 YTB 3x10 YTB        Prone row, I , T, Y  Row, I 2x10 Row, I 2x10          KB BU     5# 3x20s                                  Ther Ex             UBE  3' fwd, 3' bwd 3' fwd, 3' bwd 3' fwd, 3' bwd 3' fwd, 3' bwd        IR strap stretch  2' 2'          ER/IR             Open books    3' 4' 2ea side        Thoracic ext over pillow    2' HEP        IR stretch at wall/1st rib mob    2' 2' on table                     HEP review and pt education 10'            Ther Activity Gait Training                                       Modalities

## 2022-01-31 ENCOUNTER — OFFICE VISIT (OUTPATIENT)
Dept: PHYSICAL THERAPY | Facility: MEDICAL CENTER | Age: 67
End: 2022-01-31
Payer: COMMERCIAL

## 2022-01-31 DIAGNOSIS — G89.29 CHRONIC RIGHT SHOULDER PAIN: ICD-10-CM

## 2022-01-31 DIAGNOSIS — M25.511 CHRONIC RIGHT SHOULDER PAIN: ICD-10-CM

## 2022-01-31 DIAGNOSIS — M75.81 RIGHT ROTATOR CUFF TENDONITIS: ICD-10-CM

## 2022-01-31 DIAGNOSIS — M75.81 TENDINITIS OF RIGHT ROTATOR CUFF: Primary | ICD-10-CM

## 2022-01-31 PROCEDURE — 97140 MANUAL THERAPY 1/> REGIONS: CPT

## 2022-01-31 PROCEDURE — 97112 NEUROMUSCULAR REEDUCATION: CPT

## 2022-01-31 PROCEDURE — 97110 THERAPEUTIC EXERCISES: CPT

## 2022-01-31 NOTE — PROGRESS NOTES
Daily Note     Today's date: 2022  Patient name: Dayo Angel  : 1955  MRN: [id number]  Referring provider: Lala Thacker, *  Dx:   Encounter Diagnosis     ICD-10-CM    1  Tendinitis of right rotator cuff  M75 81    2  Chronic right shoulder pain  M25 511     G89 29    3  Right rotator cuff tendonitis  M75 81                   Subjective: Pt reports that her neck feels better  She reports that her shoulder feels the same  Objective: See treatment diary below      Assessment: Tolerated treatment well  Patient demonstrates improved thoracic mobility, shoulder mobility and shoulder ROM, especially IR  Patient was able to perform painfree AROM functional IR/BTB following IR MWM  Patient demonstrated fatigue post treatment, exhibited good technique with therapeutic exercises and would benefit from continued PT to address remaining deficits and return to PLOF  Plan: Continue per plan of care        Precautions:     HEP: scap 4, thoracic ext, 1st rib mob, IR stretch  Manuals        GHJ mobs  JH inf, AP gr 3-4 JH inf, AP gr 3-4          Thoracic mobs  PA gr 4 PA gr 4 Pa gr 4 PA gr 4 Pa gr 4       Lat release   JH          STM   subscap JH          scap mobs    [facility] NATHALY JH       Post capsule stretch             1st rib mob    nv [facility] NATHALY JH       Ir MWM      [facility] NATHALY       Neuro Re-Ed             scap 4  YTB 3x10 RTB 2x15          Band pull aparts  2x10 YTB 3x10 YTB 3x10 YTB 3x10 YTB 3x10 RTB       No monies  2x10 YTB 3x10 YTB 3x10 YTB 3x10 YTB 3x10 RTB       Prone row, I , T, [place], I 2x10 Row, I 2x10          KB BU     5# 3x20s 5# 3x20s                                 Ther Ex             UBE  3' fwd, 3' bwd 3' fwd, 3' bwd 3' fwd, 3' bwd 3' fwd, 3' bwd 3' bwd, 3' fwd       IR strap stretch  2' 2'          ER/IR             Open books    3' 4' 2ea side 4' ea side        Thoracic ext over pillow    2' HEP        IR stretch at wall/1st rib mob    2' 2' on table 2' on table HEP review and pt education 10'            Ther Activity                                       Gait Training                                       Modalities

## 2022-02-03 ENCOUNTER — OFFICE VISIT (OUTPATIENT)
Dept: PHYSICAL THERAPY | Facility: MEDICAL CENTER | Age: 67
End: 2022-02-03
Payer: COMMERCIAL

## 2022-02-03 DIAGNOSIS — M75.81 TENDINITIS OF RIGHT ROTATOR CUFF: Primary | ICD-10-CM

## 2022-02-03 DIAGNOSIS — M75.81 RIGHT ROTATOR CUFF TENDONITIS: ICD-10-CM

## 2022-02-03 DIAGNOSIS — M25.511 CHRONIC RIGHT SHOULDER PAIN: ICD-10-CM

## 2022-02-03 DIAGNOSIS — G89.29 CHRONIC RIGHT SHOULDER PAIN: ICD-10-CM

## 2022-02-03 PROCEDURE — 97110 THERAPEUTIC EXERCISES: CPT

## 2022-02-03 PROCEDURE — 97140 MANUAL THERAPY 1/> REGIONS: CPT

## 2022-02-03 PROCEDURE — 97112 NEUROMUSCULAR REEDUCATION: CPT

## 2022-02-03 NOTE — PROGRESS NOTES
Daily Note     Today's date: 2/3/2022  Patient name: Hellen Dunlap  : 1955  MRN: [id number]  Referring provider: Molly Rushing, *  Dx:   Encounter Diagnosis     ICD-10-CM    1  Tendinitis of right rotator cuff  M75 81    2  Chronic right shoulder pain  M25 511     G89 29    3  Right rotator cuff tendonitis  M75 81                   Subjective: Pt reports that she feels that there has been no change  She then noted that she no longer gets referred pain down her arm and has improvement in IR ROM (still painful) and neck pain  Objective: See treatment diary below      Assessment: Tolerated treatment well  Pt demonstrates improvement in IR ROM with less pain  She also demonstrates improved flexion ROM with no pain at end range  Patient demonstrated fatigue post treatment, exhibited good technique with therapeutic exercises and would benefit from continued PT to address remaining deficits and return to PLOF  Plan: Continue per plan of care        Precautions:     HEP: scap 4, thoracic ext, 1st rib mob, IR stretch  Manuals 1/13 1/18 1/21 1/25 1/27 1/31 2/3      GHJ mobs  JH inf, AP gr 3-4 JH inf, AP gr 3-4          Thoracic mobs  PA gr 4 PA gr 4 Pa gr 4 PA gr 4 Pa gr 4 PA gr 4      Lat release   JH          STM   subscap JH          scap mobs    [facility] JH      Post capsule stretch             1st rib mob    nv [facility] JH      Ir MWM      [facility]      Neuro Re-Ed             scap 4  YTB 3x10 RTB 2x15          Band pull aparts  2x10 YTB 3x10 YTB 3x10 YTB 3x10 YTB 3x10 RTB 3x10 RTB      No monies  2x10 YTB 3x10 YTB 3x10 YTB 3x10 YTB 3x10 RTB 3x10 RTB      Prone row, I , T, Doraine Rocky, I 2x10 Row, I 2x10          KB BU     5# 3x20s 5# 3x20s                                 Ther Ex             UBE  3' fwd, 3' bwd 3' fwd, 3' bwd 3' fwd, 3' bwd 3' fwd, 3' bwd 3' bwd, 3' fwd 3' fwd, 3' bwd      IR strap stretch  2' 2'          ER/IR             Open books    3' 4' 2ea side 4' 2' ea side  4' 2 ea side Thoracic ext over pillow    2' HEP        IR stretch at wall/1st rib mob    2' 2' on table 2' on table                    HEP review and pt education 10'            Ther Activity                                       Gait Training                                       Modalities

## 2022-02-07 ENCOUNTER — OFFICE VISIT (OUTPATIENT)
Dept: PHYSICAL THERAPY | Facility: MEDICAL CENTER | Age: 67
End: 2022-02-07
Payer: COMMERCIAL

## 2022-02-07 DIAGNOSIS — G89.29 CHRONIC RIGHT SHOULDER PAIN: ICD-10-CM

## 2022-02-07 DIAGNOSIS — M25.511 CHRONIC RIGHT SHOULDER PAIN: ICD-10-CM

## 2022-02-07 DIAGNOSIS — M75.81 RIGHT ROTATOR CUFF TENDONITIS: ICD-10-CM

## 2022-02-07 DIAGNOSIS — M75.81 TENDINITIS OF RIGHT ROTATOR CUFF: Primary | ICD-10-CM

## 2022-02-07 PROCEDURE — 97112 NEUROMUSCULAR REEDUCATION: CPT

## 2022-02-07 PROCEDURE — 97110 THERAPEUTIC EXERCISES: CPT

## 2022-02-07 PROCEDURE — 97140 MANUAL THERAPY 1/> REGIONS: CPT

## 2022-02-07 NOTE — PROGRESS NOTES
Daily Note     Today's date: 2022  Patient name: Eligio Martines  : 1955  MRN: [id number]  Referring provider: Taylor Gil, *  Dx:   Encounter Diagnosis     ICD-10-CM    1  Tendinitis of right rotator cuff  M75 81    2  Chronic right shoulder pain  M25 511     G89 29    3  Right rotator cuff tendonitis  M75 81                   Subjective: Pt reports that she feels the same  Objective: See treatment diary below      Assessment: Tolerated treatment well  After stretching exercise patient had improvement in pain and ROM  By end of session patient has smoother and improved quality of ROM  Patient demonstrated fatigue post treatment, exhibited good technique with therapeutic exercises and would benefit from continued PT to address remaining deficits and return to PLOF  Plan: Continue per plan of care        Precautions:     HEP: scap 4, thoracic ext, 1st rib mob, IR stretch  Manuals 1/13 1/18 1/21 1/25 1/27 1/31 2/3 2/7     GHJ mobs  JH inf, AP gr 3-4 JH inf, AP gr 3-4          Thoracic mobs  PA gr 4 PA gr 4 Pa gr 4 PA gr 4 Pa gr 4 PA gr 4 PA gr 4     Lat release   JH          STM   subscap JH          scap mobs    [facility] JH     Post capsule stretch             1st rib mob    nv [facility] JH     Ir MWM      [facility] JH     Neuro Re-Ed             scap 4  YTB 3x10 RTB 2x15          Band pull aparts  2x10 YTB 3x10 YTB 3x10 YTB 3x10 YTB 3x10 RTB 3x10 RTB 3x10 RTB     No monies  2x10 YTB 3x10 YTB 3x10 YTB 3x10 YTB 3x10 RTB 3x10 RTB 3x10 RTB     Prone row, I , T, Gloria Min, I 2x10 Row, I 2x10          KB BU     5# 3x20s 5# 3x20s  5# 3x20s                               Ther Ex             UBE  3' fwd, 3' bwd 3' fwd, 3' bwd 3' fwd, 3' bwd 3' fwd, 3' bwd 3' bwd, 3' fwd 3' fwd, 3' bwd 3' fwd, 3' bwd     IR strap stretch  2' 2'          ER/IR             Open books    3' 4' 2ea side 4' 2' ea side  4' 2 ea side 4' 2 ea side     Thoracic ext over pillow    2' HEP        IR stretch at wall/1st rib mob 2' 2' on table 2' on table       Radha OH stretch        2# 10x10s     HEP review and pt education 10'            Ther Activity                                       Gait Training                                       Modalities

## 2022-02-10 ENCOUNTER — APPOINTMENT (OUTPATIENT)
Dept: PHYSICAL THERAPY | Facility: MEDICAL CENTER | Age: 67
End: 2022-02-10
Payer: COMMERCIAL

## 2022-02-17 ENCOUNTER — OFFICE VISIT (OUTPATIENT)
Dept: PHYSICAL THERAPY | Facility: MEDICAL CENTER | Age: 67
End: 2022-02-17
Payer: COMMERCIAL

## 2022-02-17 DIAGNOSIS — M75.81 RIGHT ROTATOR CUFF TENDONITIS: ICD-10-CM

## 2022-02-17 DIAGNOSIS — G89.29 CHRONIC RIGHT SHOULDER PAIN: ICD-10-CM

## 2022-02-17 DIAGNOSIS — M25.511 CHRONIC RIGHT SHOULDER PAIN: ICD-10-CM

## 2022-02-17 DIAGNOSIS — M75.81 TENDINITIS OF RIGHT ROTATOR CUFF: Primary | ICD-10-CM

## 2022-02-17 PROCEDURE — 97112 NEUROMUSCULAR REEDUCATION: CPT

## 2022-02-17 PROCEDURE — 97140 MANUAL THERAPY 1/> REGIONS: CPT

## 2022-02-17 PROCEDURE — 97110 THERAPEUTIC EXERCISES: CPT

## 2022-02-17 NOTE — PROGRESS NOTES
Daily Note     Today's date: 2022  Patient name: Jo Pinto  : 1955  MRN: [id number]  Referring provider: Eduarda Cameron, *  Dx:   Encounter Diagnosis     ICD-10-CM    1  Tendinitis of right rotator cuff  M75 81    2  Chronic right shoulder pain  M25 511     G89 29    3  Right rotator cuff tendonitis  M75 81                   Subjective: Pt reports that she injured her back so she has not been doing her shoulder exercises as much  She reports that she has seen improvement in her shoulder since starting, however with a few days off she noticed that it tightened up quickly  Objective: See treatment diary below      Assessment: Pt presented with increased stiffness, tightness, and hypomobility in R GHJ and thoracic spine  ROM improved following manual interventions  Tolerated treatment well  Patient demonstrated fatigue post treatment, exhibited good technique with therapeutic exercises and would benefit from continued PT to address remaining deficits and return to PLOF  Plan: Continue per plan of care        Precautions:     HEP: scap 4, thoracic ext, 1st rib mob, IR stretch  Manuals 1/13 1/18 1/21 1/25 1/27 1/31 2/3 2/7 2/17    GHJ mobs  JH inf, AP gr 3-4 JH inf, AP gr 3-4          Thoracic mobs  PA gr 4 PA gr 4 Pa gr 4 PA gr 4 Pa gr 4 PA gr 4 PA gr 4 PA gr 4    Lat release             STM   subscap       Sub scap/scalenes     scap mobs    [facility]    Post capsule stretch             1st rib mob    nv [facility] JH    Ir MWM      [facility]    Neuro Re-Ed             scap 4  YTB 3x10 RTB 2x15          Band pull aparts  2x10 YTB 3x10 YTB 3x10 YTB 3x10 YTB 3x10 RTB 3x10 RTB 3x10 RTB 3x10 RTB    No monies  2x10 YTB 3x10 YTB 3x10 YTB 3x10 YTB 3x10 RTB 3x10 RTB 3x10 RTB 3x10 RTB    Prone row, I , T, Y  Row, I 2x10 Row, I 2x10          KB BU     5# 3x20s 5# 3x20s  5# 3x20s                               Ther Ex             UBE  3' fwd, 3' bwd 3' fwd, 3' bwd 3' fwd, 3' bwd 3' fwd, 3' bwd 3' bwd, 3' fwd 3' fwd, 3' bwd 3' fwd, 3' bwd 3' fwd, 3' bwd    IR strap stretch  2' 2'          ER/IR             Open books    3' 4' 2ea side 4' 2' ea side  4' 2 ea side 4' 2 ea side 4' 2 ea side    Thoracic ext over pillow    2' HEP        IR stretch at wall/1st rib mob    2' 2' on table 2' on table       Wand OH stretch        2# 10x10s 2# 10x10s    HEP review and pt education 10'            Ther Activity                                       Gait Training                                       Modalities

## 2022-02-21 ENCOUNTER — OFFICE VISIT (OUTPATIENT)
Dept: PHYSICAL THERAPY | Facility: MEDICAL CENTER | Age: 67
End: 2022-02-21
Payer: COMMERCIAL

## 2022-02-21 DIAGNOSIS — G89.29 CHRONIC RIGHT SHOULDER PAIN: ICD-10-CM

## 2022-02-21 DIAGNOSIS — M25.511 CHRONIC RIGHT SHOULDER PAIN: ICD-10-CM

## 2022-02-21 DIAGNOSIS — M75.81 TENDINITIS OF RIGHT ROTATOR CUFF: Primary | ICD-10-CM

## 2022-02-21 DIAGNOSIS — M75.81 RIGHT ROTATOR CUFF TENDONITIS: ICD-10-CM

## 2022-02-21 PROCEDURE — 97112 NEUROMUSCULAR REEDUCATION: CPT

## 2022-02-21 PROCEDURE — 97140 MANUAL THERAPY 1/> REGIONS: CPT

## 2022-02-21 PROCEDURE — 97110 THERAPEUTIC EXERCISES: CPT

## 2022-02-21 NOTE — PROGRESS NOTES
Daily Note     Today's date: 2022  Patient name: Anne Maguire  : 1955  MRN: [id number]  Referring provider: Michael Valdes, *  Dx:   Encounter Diagnosis     ICD-10-CM    1  Tendinitis of right rotator cuff  M75 81    2  Chronic right shoulder pain  M25 511     G89 29    3  Right rotator cuff tendonitis  M75 81                   Subjective: Pt reports that she felt achy over the weekend  She reports that she is better today  Objective: See treatment diary below      Assessment: Pt had improved ROM and decreased pain after manual interventions  Tolerated treatment well  Patient demonstrated fatigue post treatment, exhibited good technique with therapeutic exercises and would benefit from continued PT to address remaining deficits and return to PLOF  Plan: Continue per plan of care        Precautions:     HEP: scap 4, thoracic ext, 1st rib mob, IR stretch  Manuals     GHJ mobs             Thoracic mobs PA gr4        PA gr 4    Lat release             STM Sub scap/scalenes JH        Sub scap/scalenes JH    scap mobs [facility]        JH    Post capsule stretch             1st rib mob [facility] NATHALY    Ir MWM [facility] NATHALY    Neuro Re-Ed             scap 4             Band pull aparts 3x10 RTB        3x10 RTB    No monies 3x10 RTB        3x10 RTB    Prone row, I , T, Y             KB BU                                       Ther Ex             UBE 3' F, 3' B        3' fwd, 3' bwd    IR strap stretch             ER/IR             Open books 4' 2 ea side        4' 2 ea side    Thoracic ext over pillow             IR stretch at wall/1st rib mob             Wand OH stretch 3# 10x10s        2# 10x10s    HEP review and pt education             Ther Activity                                       Gait Training                                       Modalities

## 2022-02-22 ENCOUNTER — OFFICE VISIT (OUTPATIENT)
Dept: OBGYN CLINIC | Facility: MEDICAL CENTER | Age: 67
End: 2022-02-22
Payer: COMMERCIAL

## 2022-02-22 VITALS
HEART RATE: 78 BPM | HEIGHT: 62 IN | TEMPERATURE: 97.8 F | WEIGHT: 138 LBS | SYSTOLIC BLOOD PRESSURE: 138 MMHG | DIASTOLIC BLOOD PRESSURE: 84 MMHG | BODY MASS INDEX: 25.4 KG/M2

## 2022-02-22 DIAGNOSIS — M75.81 RIGHT ROTATOR CUFF TENDONITIS: Primary | ICD-10-CM

## 2022-02-22 PROCEDURE — 3008F BODY MASS INDEX DOCD: CPT | Performed by: ORTHOPAEDIC SURGERY

## 2022-02-22 PROCEDURE — 99213 OFFICE O/P EST LOW 20 MIN: CPT | Performed by: ORTHOPAEDIC SURGERY

## 2022-02-22 PROCEDURE — 1036F TOBACCO NON-USER: CPT | Performed by: ORTHOPAEDIC SURGERY

## 2022-02-22 NOTE — PROGRESS NOTES
Orthopaedic Surgery - Office Note  Gerhardt Hoover (54 y o  female)   : 1955   MRN: [id number]  Encounter Date: 2022    No chief complaint on file  Assessment / Plan  Right shoulder rotator cuff tendinitis, improvming      · She understands that the strengthening the scapular strengthening takes time  She is improving and likely will with continued PT    · Continue with PT and keep up with HEP  · Continue with activity as tolerated   · Continue with ice, heat and anti-inflammatories  Return in about 6 weeks (around 2022)  History of Present Illness  Gerhardt Hoover is a 61 y o  female who presents for follow up right shoulder rotator cuff tendinitis  Since her previous visit, she has been attending PT and has noticed significant improvements in ROM  However, she states although she feels she is progressing she is still having pain and discomfort with some ADL's  She notes the most difficulty with IR  She continues to have symptoms laterally  She denies any radiating symptoms  She feels that she is making slower progress than what she expected  Review of Systems  Pertinent items are noted in HPI  All other systems were reviewed and are negative  Physical Exam  /84   Pulse 78   Temp 97 8 °F (36 6 °C)   Ht 5' 2" (1 575 m)   Wt 62 6 kg (138 lb)   BMI 25 24 kg/m²   Cons: Appears well  No apparent distress  Psych: Alert  Oriented x3  Mood and affect normal   Eyes: PERRLA, EOMI  Resp: Normal effort  No audible wheezing or stridor  CV: Palpable pulse  No discernable arrhythmia  No LE edema  Lymph:  No palpable cervical, axillary, or inguinal lymphadenopathy  Skin: Warm  No palpable masses  No visible lesions  Neuro: Normal muscle tone  Normal and symmetric DTR's  Right Shoulder Exam  Alignment / Posture:  Normal shoulder posture  Inspection:  No swelling  No ecchymosis  Palpation:  No tenderness  No effusion  ROM:  Shoulder   Shoulder ER 60   Shoulder IR T8   Strength:  5/5 supraspinatus, infraspinatus, and subscapularis  Stability:  No objective shoulder instability  Tests: No pertinent positive or negative tests  Neurovascular:  Sensation intact in Ax/R/M/U nerve distributions  2+ radial pulse  Studies Reviewed  I have personally reviewed pertinent films in PACS  XR of right shoulder - Performed on 2021 reveal no acute osseousabnormality  Mild glenohumeral and AC joint osteoarthritis  Procedures  No procedures today  Medical, Surgical, Family, and Social History  The patient's medical history, family history, and social history, were reviewed and updated as appropriate  Past Medical History:   Diagnosis Date    Anxiety     last assessed 14, resolved 16    Closed displaced fracture of proximal phalanx of lesser toe of right foot 2020    Disease of thyroid gland     Headache syndrome 2017    Hematuria     last assessed 4/23/15, resolved 16    Left breast mass 2019     Questionable mass on exam 2019 May be patch of more cystic breast tissue, not well defined   Will check dx mammo  Mammogram was normal     PONV (postoperative nausea and vomiting)     Skin neoplasm     last assessed 9/18/15, resolved 16       Past Surgical History:   Procedure Laterality Date    COLONOSCOPY      HYSTEROSCOPY WITH  RESECTION UTERINE TUMOR/FIBROID  2020    SHOULDER SURGERY Left 2009    tear repair    TOOTH EXTRACTION         Family History   Problem Relation Age of Onset    Hyperlipidemia Mother     Cancer Father         of spine    Stroke Father         syndrome     No Known Problems Sister     Breast cancer Maternal Aunt 72    No Known Problems Maternal Aunt        Social History     Occupational History    Not on file   Tobacco Use    Smoking status: Former Smoker     Types: Cigarettes     Quit date: 1990     Years since quittin 1    Smokeless tobacco: Never Used   Vaping Use    Vaping Use: Never used   Substance and Sexual Activity    Alcohol use:  Yes     Alcohol/week: 5 0 standard drinks     Types: 5 Glasses of wine per week     Comment: occasionally / social     Drug use: No    Sexual activity: Yes     Partners: Male       Allergies   Allergen Reactions    Sulfa Antibiotics Rash         Current Outpatient Medications:     Cholecalciferol (Vitamin D3) 50 MCG (2000 UT) capsule, Take 1 capsule (2,000 Units total) by mouth daily, Disp: , Rfl: 0    ibuprofen (MOTRIN) 200 mg tablet, Take 200 mg by mouth every 6 (six) hours as needed, Disp: , Rfl:     levothyroxine 75 mcg tablet, Take 1 tablet (75 mcg total) by mouth daily, Disp: 30 tablet, Rfl: 11    estradiol (ESTRACE) 0 1 mg/g vaginal cream, Has on hand, has not started yet (6/30/2021) (Patient not taking: Reported on 1/13/2022), Disp: , Rfl:     fluticasone (FLONASE) 50 mcg/act nasal spray, 1 spray into each nostril (Patient not taking: Reported on 1/13/2022 ), Disp: , Rfl:     mometasone (ELOCON) 0 1 % lotion, Apply topically daily To affected ear canal (Patient not taking: Reported on 1/13/2022 ), Disp: 60 mL, Rfl: 0      [employer]    I,:  Zackary Diaz am acting as a scribe while in the presence of the attending physician :       I,:  Jeison Benson MD personally performed the services described in this documentation    as scribed in my presence :

## 2022-02-28 ENCOUNTER — OFFICE VISIT (OUTPATIENT)
Dept: PHYSICAL THERAPY | Facility: MEDICAL CENTER | Age: 67
End: 2022-02-28
Payer: COMMERCIAL

## 2022-02-28 DIAGNOSIS — M25.511 CHRONIC RIGHT SHOULDER PAIN: ICD-10-CM

## 2022-02-28 DIAGNOSIS — M75.81 RIGHT ROTATOR CUFF TENDONITIS: ICD-10-CM

## 2022-02-28 DIAGNOSIS — M75.81 TENDINITIS OF RIGHT ROTATOR CUFF: Primary | ICD-10-CM

## 2022-02-28 DIAGNOSIS — G89.29 CHRONIC RIGHT SHOULDER PAIN: ICD-10-CM

## 2022-02-28 PROCEDURE — 97110 THERAPEUTIC EXERCISES: CPT

## 2022-02-28 PROCEDURE — 97112 NEUROMUSCULAR REEDUCATION: CPT

## 2022-02-28 PROCEDURE — 97140 MANUAL THERAPY 1/> REGIONS: CPT

## 2022-02-28 NOTE — PROGRESS NOTES
Daily Note     Today's date: 2022  Patient name: Avinash Oleary  : 1955  MRN: [id number]  Referring provider: Ciaran Vazquez, *  Dx:   Encounter Diagnosis     ICD-10-CM    1  Tendinitis of right rotator cuff  M75 81    2  Chronic right shoulder pain  M25 511     G89 29    3  Right rotator cuff tendonitis  M75 81                   Subjective: Pt reports that she feels alright  She reports that her follow up went okay  She states that Dr Ugo Acevedo offered her an injection, however she wants to continue the course of PT  Objective: See treatment diary below      Assessment: Tolerated treatment well  Incorporated posterior capsule stretch to improve BTB ROM with more comfort  Patient demonstrated fatigue post treatment, exhibited good technique with therapeutic exercises and would benefit from continued PT to address remaining deficits and return to PLOF  Plan: Continue per plan of care        Precautions:     HEP: scap 4, thoracic ext, 1st rib mob, IR stretch  Manuals     GHJ mobs             Thoracic mobs PA gr4 P agr 4       PA gr 4    Lat release             STM Sub scap/scalenes JH subscap/scalenes JH       Sub scap/scalenes JH    scap mobs JH Jh       JH    Post capsule stretch  Jh prone           1st rib mob [facility] NATHALY [employer] [facility] NATHALY       [employer]    Ir MWM [facility]       JH    Neuro Re-Ed             scap 4             Band pull aparts 3x10 RTB 3x10 GTB       3x10 RTB    No monies 3x10 RTB 3x10 GTB       3x10 RTB    Prone row, I , T, Y             KB BU                                       Ther Ex             UBE 3' F, 3' B 3' F, 3'B       3' fwd, 3' bwd    IR strap stretch             ER/IR             Open books 4' 2 ea side 4' 2 ea side       4' 2 ea side    Thoracic ext over pillow             IR stretch at wall/1st rib mob             Sleeper stretch  3x30s           Wand OH stretch 3# 10x10s 3# 10x10s       2# 10x10s    HEP review and pt education             Ther Activity Gait Training                                       Modalities

## 2022-03-01 ENCOUNTER — PROCEDURE VISIT (OUTPATIENT)
Dept: UROLOGY | Facility: CLINIC | Age: 67
End: 2022-03-01
Payer: COMMERCIAL

## 2022-03-01 VITALS
BODY MASS INDEX: 26.13 KG/M2 | WEIGHT: 142 LBS | SYSTOLIC BLOOD PRESSURE: 122 MMHG | HEART RATE: 82 BPM | HEIGHT: 62 IN | DIASTOLIC BLOOD PRESSURE: 86 MMHG

## 2022-03-01 DIAGNOSIS — N39.0 RECURRENT UTI: Primary | ICD-10-CM

## 2022-03-01 LAB
SL AMB  POCT GLUCOSE, UA: NORMAL
SL AMB LEUKOCYTE ESTERASE,UA: NORMAL
SL AMB POCT BILIRUBIN,UA: NORMAL
SL AMB POCT BLOOD,UA: NORMAL
SL AMB POCT CLARITY,UA: CLEAR
SL AMB POCT COLOR,UA: YELLOW
SL AMB POCT KETONES,UA: NORMAL
SL AMB POCT NITRITE,UA: NORMAL
SL AMB POCT PH,UA: 6.5
SL AMB POCT SPECIFIC GRAVITY,UA: 1
SL AMB POCT URINE PROTEIN: NORMAL
SL AMB POCT UROBILINOGEN: 0.2

## 2022-03-01 PROCEDURE — 81002 URINALYSIS NONAUTO W/O SCOPE: CPT | Performed by: UROLOGY

## 2022-03-01 PROCEDURE — 3008F BODY MASS INDEX DOCD: CPT | Performed by: UROLOGY

## 2022-03-01 PROCEDURE — 1036F TOBACCO NON-USER: CPT | Performed by: UROLOGY

## 2022-03-01 PROCEDURE — 99214 OFFICE O/P EST MOD 30 MIN: CPT | Performed by: UROLOGY

## 2022-03-01 PROCEDURE — 52000 CYSTOURETHROSCOPY: CPT | Performed by: UROLOGY

## 2022-03-01 NOTE — PATIENT INSTRUCTIONS
Uterine Prolapse   AMBULATORY CARE:   A uterine prolapse  is a condition that causes your uterus to slip down into your vagina  Prolapse can happen if the tissues and muscles supporting your uterus become weak or damaged  Common signs and symptoms:   · Pelvic pressure or heaviness    · A soft bulge or lump in your vagina that may bulge through your vaginal opening    · Trouble urinating or having a bowel movement    · Pain in your lower back, pelvis, or vagina    · Pain during sex    Seek care immediately if:   · You have bleeding from your vagina that does not stop  · You have a mass coming out of your vagina that you cannot push back in     · You are unable to urinate or have a bowel movement  · You have severe abdominal pain  Call your doctor or gynecologist if:   · You are leaking urine or bowel movement  · You have a fever  · You have foul-smelling fluid coming from your vagina  · You see blood coming from your vagina that is not from your monthly period  · You have questions or concerns about your condition or care  Treatment:  The goal of treatment is to correct the prolapse and relieve your signs and symptoms  Treatment may include any of the following:  · Estrogen  may help strengthen the pelvic muscles and keep the prolapse from getting worse  This may be taken as a pill, applied as a cream, or inserted into your vagina  · A vaginal device,  such as a pessary or sphere, may be used to hold your uterus in place and support your muscles  These devices may help decrease pressure on other pelvic organs, or help you do Kegel exercises  If your gynecologist fits you for a pessary, you will need to remove and clean it regularly  You will be taught when and how to clean the pessary  · Surgery  may be needed to fix the prolapse  You may have surgery to tighten the muscles and tissues that hold your uterus in place   Your healthcare provider may use a mesh patch or a tissue graft to support or hold your uterus in place  Hysterectomy is surgery used to remove your uterus  Surgery to close your vagina may be used to hold your uterus in place  Manage your symptoms:   · Sit with your legs elevated  This can help relieve pain or discomfort  Put pillows or blankets under your ankles to elevate your entire legs  · Do Kegel exercises  These exercises strengthen the muscles that hold your uterus in place  They also tighten the muscles you use when you urinate or have a bowel movement  Tighten muscles in your pelvis (muscles you use to stop urinating)  Hold the muscles tight for 5 seconds, then relax for 5 seconds  Gradually work up to holding the muscles contracted for 10 seconds  Do at least 3 sets of 10 repetitions a day  · Do not strain  Do not lift heavy objects, stand for long periods of time, or strain to have a bowel movement  Prevent constipation by drinking plenty of liquids and eating foods high in fiber  Ask how much liquid to drink every day  High-fiber foods include fresh fruits, vegetables, and whole grains  · Maintain a healthy weight  Ask your healthcare provider for a healthy weight for you  Ask him or her to help you create a weight loss plan if you are overweight  He or she can also help you create an exercise program  Exercise helps your bowels work better and decreases pressure inside your colon  Follow up with your doctor or gynecologist as directed: You may need to return regularly to have your pessary checked  You may also need to see your gynecologist for possible surgery  Write down your questions so you remember to ask them during your visits  © [employer] 2022 Information is for End User's use only and may not be sold, redistributed or otherwise used for commercial purposes   All illustrations and images included in CareNotes® are the copyrighted property of A D A M , Inc  or [facility] Garth Porras   The above information is an educational aid only  It is not intended as medical advice for individual conditions or treatments  Talk to your doctor, nurse or pharmacist before following any medical regimen to see if it is safe and effective for you

## 2022-03-01 NOTE — PROGRESS NOTES
UROLOGY PROCEDURE NOTE     CHIEF COMPLAINT   Titi Love is a 61 y o  female with a complaint of   Chief Complaint   Patient presents with    Cystoscopy    Urinary Tract Infection       History of Present Illness:     61 y o  female who presents after recurrent urinary tract infections, some of these have been culture proven and others have returned negative  Patient has developed gross hematuria  She has had a prior renal bladder ultrasound which was negative  At our last visit, patient was recommended to start vaginal estrogen but has not yet begun this medication  She has reported pain with intercourse in the past   She has been evaluated by her OBGYN and they did report a stage II uterovaginal prolapse with Valsalva by review the note  Patient was unclear on this diagnosis  Past Medical History:     Past Medical History:   Diagnosis Date    Anxiety     last assessed 12/1/14, resolved 2/1/16    Closed displaced fracture of proximal phalanx of lesser toe of right foot 9/11/2020    Disease of thyroid gland     Headache syndrome 5/12/2017    Hematuria     last assessed 4/23/15, resolved 2/1/16    Left breast mass 12/18/2019     Questionable mass on exam 2019 May be patch of more cystic breast tissue, not well defined   Will check dx mammo  Mammogram was normal 9/20    PONV (postoperative nausea and vomiting)     Skin neoplasm     last assessed 9/18/15, resolved 2/1/16       PAST SURGICAL HISTORY:     Past Surgical History:   Procedure Laterality Date    COLONOSCOPY      HYSTEROSCOPY WITH  RESECTION UTERINE TUMOR/FIBROID  03/2020    SHOULDER SURGERY Left 2009    tear repair    TOOTH EXTRACTION         CURRENT MEDICATIONS:     Current Outpatient Medications   Medication Sig Dispense Refill    Cholecalciferol (Vitamin D3) 50 MCG (2000 UT) capsule Take 1 capsule (2,000 Units total) by mouth daily  0    estradiol (ESTRACE) 0 1 mg/g vaginal cream Has on hand, has not started yet (6/30/2021) (Patient not taking: Reported on 2022)      fluticasone (FLONASE) 50 mcg/act nasal spray 1 spray into each nostril (Patient not taking: Reported on 2022 )      ibuprofen (MOTRIN) 200 mg tablet Take 200 mg by mouth every 6 (six) hours as needed      levothyroxine 75 mcg tablet Take 1 tablet (75 mcg total) by mouth daily 30 tablet 11    mometasone (ELOCON) 0 1 % lotion Apply topically daily To affected ear canal (Patient not taking: Reported on 2022 ) 60 mL 0     No current facility-administered medications for this visit  ALLERGIES:     Allergies   Allergen Reactions    Sulfa Antibiotics Rash       SOCIAL HISTORY:     Social History     Socioeconomic History    Marital status:      Spouse name: Not on file    Number of children: Not on file    Years of education: Not on file    Highest education level: Not on file   Occupational History    Not on file   Tobacco Use    Smoking status: Former Smoker     Types: Cigarettes     Quit date: 1990     Years since quittin 1    Smokeless tobacco: Never Used   Vaping Use    Vaping Use: Never used   Substance and Sexual Activity    Alcohol use:  Yes     Alcohol/week: 5 0 standard drinks     Types: 5 Glasses of wine per week     Comment: occasionally / social     Drug use: No    Sexual activity: Yes     Partners: Male   Other Topics Concern    Not on file   Social History Narrative    Not on file     Social Determinants of Health     Financial Resource Strain: Not on file   Food Insecurity: Not on file   Transportation Needs: Not on file   Physical Activity: Not on file   Stress: Not on file   Social Connections: Not on file   Intimate Partner Violence: Not on file   Housing Stability: Not on file       SOCIAL HISTORY:     Family History   Problem Relation Age of Onset    Hyperlipidemia Mother     Cancer Father         of spine    Stroke Father         syndrome     No Known Problems Sister     Breast cancer Maternal Aunt 72    No Known Problems Maternal Aunt        REVIEW OF SYSTEMS:     Review of Systems   Constitutional: Negative  Respiratory: Negative  Cardiovascular: Negative  Gastrointestinal: Negative  Genitourinary: Positive for dyspareunia, hematuria and pelvic pain  Musculoskeletal: Negative  Skin: Negative  Psychiatric/Behavioral: Negative  PHYSICAL EXAM:     There were no vitals taken for this visit  General:  Healthy appearing female in no acute distress  They have a normal affect  There is not appear to be any gross neurologic defects or abnormalities  HEENT:  Normocephalic, atraumatic  Neck is supple without any palpable lymphadenopathy  Cardiovascular:  Patient has normal palpable distal radial pulses  There is no significant peripheral edema  No JVD is noted  Respiratory:  Patient has unlabored respirations  There is no audible wheeze or rhonchi  Abdomen:   Abdomen is soft and nontender  There is no tympany  Inguinal and umbilical hernia are not appreciated  Genitourinary:  Mildly atrophic external female genitalia, apical and anterior prolapse with Valsalva to the level of the vaginal os    Musculoskeletal:  Patient does not have significant CVA tenderness in the  flank with palpation or percussion  They full range of motion in all 4 extremities  Strength in all 4 extremities appears congruent  Patient is able to ambulate without assistance or difficulty  Dermatologic:  Patient has no skin abnormalities or rashes        LABS:     CBC:   Lab Results   Component Value Date    WBC 4 37 12/08/2021    HGB 12 9 12/08/2021    HCT 40 2 12/08/2021    MCV 99 (H) 12/08/2021     12/08/2021       BMP:   Lab Results   Component Value Date    GLUCOSE 98 12/03/2015    CALCIUM 8 8 12/08/2021     12/03/2015    K 3 7 12/08/2021    CO2 28 12/08/2021     12/08/2021    BUN 13 12/08/2021    CREATININE 0 66 12/08/2021     Component Ref Range & Units 10/12/21  1:07 PM 21  2:24 PM 16  3:11 PM   RBC, UA None Seen, 2-4 /hpf None Seen  Innumerable Abnormal   30-50 Abnormal  R    WBC, UA None Seen, 2-4, 5-60 /hpf None Seen  Field obscured, unable to enumerate Abnormal   Innumerable Abnormal  R    Epithelial Cells None Seen, Occasional /hpf None Seen  Occasional  None Seen    Bacteria, UA None Seen, Occasional /hpf None Seen  None Seen  Occasional    Hyaline Casts, UA None Seen /lpf None Seen   5-10 Abnormal                Specimen Collected: 10/12/21  1:07 PM Last Resulted: 10/12/21  3:16 PM             IMAGIN/13/22  RENAL ULTRASOUND     INDICATION:   N39 0: Urinary tract infection, site not specified  R31 0: Gross hematuria      COMPARISON: None     TECHNIQUE:   Ultrasound of the retroperitoneum was performed with a curvilinear transducer utilizing volumetric sweeps and still imaging techniques       FINDINGS:     KIDNEYS:  Symmetric and normal size  Right kidney:  10 5 x 4 3 x 5 1 cm  Left kidney:  11 1 x 4 8 x 5 0 cm      Right kidney  Normal echogenicity and contour  No suspicious masses detected  No hydronephrosis  No shadowing calculi  No perinephric fluid collections      Left kidney  Normal echogenicity and contour  No suspicious masses detected  No hydronephrosis  No shadowing calculi  No perinephric fluid collections      URETERS:  Nonvisualized      BLADDER:   Normally distended  Prevoid volume of 1 77 mL in post void volume of 58 mL  No focal thickening or mass lesions  Bilateral ureteral jets detected         IMPRESSION:  Prevoid volume of 177 mL and post void volume of 58 mL  Unremarkable kidneys  Sherrie Yao PROCEDURE:     SEE NOTE    ASSESSMENT:     61 y o  female with gross hematuria    PLAN:     No abnormal findings on the patient's cystoscopy  Patient does have pelvic organ prolapse as described to her today  Offer of referral to Urogynecology was made  Patient defers at this point    Did describe to her the benefits of topical hormonal cream in the form of Estrace  She has not yet begun this medication  Would recommend follow-up in 6 months with advanced practitioner to assess for bleeding and symptoms of UTI

## 2022-03-01 NOTE — PROGRESS NOTES
Cystoscopy     Date/Time 3/1/2022 1:21 PM     Performed by  Paresh Hobson MD     Authorized by Paresh Hobson MD      Universal Protocol:  Timeout called at: 3/1/2022 1:21 PM       Procedure Details:  Procedure type: cystoscopy    Patient tolerance: Patient tolerated the procedure well with no immediate complications    Additional Procedure Details:   Cystoscopy Procedure note    Risk and benefits of flexible cystoscopy were discussed  Informed consent was obtained  A urine dip is adequate for cystoscopy  The patient was placed into the modified supine position  Her genitalia was prepped with Betadine and draped in a sterile fashion  Viscous 2% lidocaine was instilled into the urethra and allowed dwell time for local anesthesia  Flexible cystoscopy was then performed with a 16F scope  Urethra was inspected on entrance and exit of the scope  The bladder was thoroughly inspected in a 360 degree fashion  Both ureteral orifices were visualized in their orthotopic location with clear efflux of urine  The bladder mucosa was thoroughly inspected  There was no evidence of mucosal abnormalities, stones, or lesions  Retroflexion for evaluation of the bladder neck was normal       Overall this was a negative cystoscopy  A female office staff member was present throughout the entire procedure

## 2022-03-04 ENCOUNTER — EVALUATION (OUTPATIENT)
Dept: PHYSICAL THERAPY | Facility: MEDICAL CENTER | Age: 67
End: 2022-03-04
Payer: COMMERCIAL

## 2022-03-04 DIAGNOSIS — M75.81 TENDINITIS OF RIGHT ROTATOR CUFF: Primary | ICD-10-CM

## 2022-03-04 DIAGNOSIS — G89.29 CHRONIC RIGHT SHOULDER PAIN: ICD-10-CM

## 2022-03-04 DIAGNOSIS — M75.81 RIGHT ROTATOR CUFF TENDONITIS: ICD-10-CM

## 2022-03-04 DIAGNOSIS — M25.511 CHRONIC RIGHT SHOULDER PAIN: ICD-10-CM

## 2022-03-04 PROCEDURE — 97112 NEUROMUSCULAR REEDUCATION: CPT

## 2022-03-04 PROCEDURE — 97164 PT RE-EVAL EST PLAN CARE: CPT

## 2022-03-04 PROCEDURE — 97140 MANUAL THERAPY 1/> REGIONS: CPT

## 2022-03-04 PROCEDURE — 97110 THERAPEUTIC EXERCISES: CPT

## 2022-03-04 NOTE — PROGRESS NOTES
Re-Evaluation    Today's date: 3/4/2022  Patient name: Fiordaliza   : 1955  MRN: [id number]  Referring provider: Ileana Connors, *  Dx:   Encounter Diagnosis     ICD-10-CM    1  Tendinitis of right rotator cuff  M75 81    2  Chronic right shoulder pain  M25 511     G89 29    3  Right rotator cuff tendonitis  M75 81                   Subjective: Pt reports that she feels alright  She reports that her follow up went okay  She states that Dr Smita Beasley offered her an injection, however she wants to continue the course of PT  Objective: See treatment diary below  Postural Observations  Seated posture: poor  Standing posture: fair  Correction of posture: makes symptoms better      Palpation   Left   Hypertonic in the upper trapezius  Tenderness of the upper trapezius  Right   Hypertonic in the upper trapezius  Tenderness of the upper trapezius  Cervical/Thoracic Screen   Cervical range of motion within normal limits with the following exceptions: Limited ROT b/l , 1st rib hypomobility   Thoracic range of motion within normal limits with the following exceptions: Limited thoracic extension  Restricted T1-T2 R SB and R ROT  Scapular hypomobility into depression and retraction    Active Range of Motion   Left Shoulder   Normal active range of motion  Flexion: 180 degrees   Abduction: 180 degrees   External rotation BTH: C7   Internal rotation BTB: T9    Right Shoulder   Flexion: 165 degrees   Abduction: 165 degrees   External rotation BTH: C5   Internal rotation BTB: T9 p!  At end range    Passive Range of Motion   Left Shoulder   Normal passive range of motion    Right Shoulder   Normal passive range of motion  ER restricited by 10 deg compared to CL side    Strength/Myotome Testing     Left Shoulder   Normal muscle strength    Right Shoulder     Planes of Motion   Flexion: 4  Abduction: 4  External rotation at 0°: 4  Internal rotation at 0°: 4 +    Tests   Right Shoulder   Positive Hawkin's, Neer's and painful arc  Negative drop arm and external rotation lag sig      Assessment: Movement impairment diagnosis of GHJ  Hypomobility, poor posture, and upper crossed syndrome has been improving resulting in resolution of limitations in cervical ROT ROM and shoulder ROM and improvement in her ability to reach behind her back and reaching backwards  No further referral appears necessary at this time based upon examination findings  Probable movement impairment diagnosis of scapular hypomobility and poor GHJ/scapular dissociation resulting in pathoanatomical symptoms of R shoulder pain and limiting her ability to dress, specifically take a bra off, and reach behind her to get something  Positive prognostic indicators include positive attitude toward recovery  Negative prognostic indicators include chronicity of symptoms  Prognosis is good given HEP compliance and  PT 2x/ week for 6-8 weeks       Plan: 2x/week for 6-8 weeks     Precautions:     HEP: scap 4, thoracic ext, 1st rib mob, IR stretch  Manuals 2/21 2/28 3/4      2/17    GHJ mobs             Thoracic mobs PA gr4 P agr 4       PA gr 4    Lat release             STM Sub scap/scalenes JH subscap/scalenes JH       Sub scap/scalenes JH    scap mobs [facility] NATHALY Jh JH dep/retr       JH    Post capsule stretch  Jh prone           1st rib mob [facility] NATHALY       JH    [facility]       JH    T1-T2 mob   JH R SB/R ROT          Neuro Re-Ed             scap 4             Band pull aparts 3x10 RTB 3x10 GTB 3x10 GTB      3x10 RTB    No monies 3x10 RTB 3x10 GTB 3x10 GTB      3x10 RTB    Prone row, I , T, Y             KB BU                                       Ther Ex             UBE 3' F, 3' B 3' F, 3'B 3'f 3'b      3' fwd, 3' bwd    IR strap stretch             ER/IR             Open books 4' 2 ea side 4' 2 ea side 4' 2 ea side      4' 2 ea side    Thoracic ext over pillow             IR stretch at wall/1st rib mob             Sleeper stretch  3x30s           [facility] [place] [facility] 3# 10x10s 3# 10x10s       2# 10x10s    HEP review and pt education             Ther Activity                                       Gait Training                                       Modalities

## 2022-03-08 ENCOUNTER — OFFICE VISIT (OUTPATIENT)
Dept: PHYSICAL THERAPY | Facility: MEDICAL CENTER | Age: 67
End: 2022-03-08
Payer: COMMERCIAL

## 2022-03-08 DIAGNOSIS — G89.29 CHRONIC RIGHT SHOULDER PAIN: ICD-10-CM

## 2022-03-08 DIAGNOSIS — M75.81 RIGHT ROTATOR CUFF TENDONITIS: ICD-10-CM

## 2022-03-08 DIAGNOSIS — M75.81 TENDINITIS OF RIGHT ROTATOR CUFF: Primary | ICD-10-CM

## 2022-03-08 DIAGNOSIS — M25.511 CHRONIC RIGHT SHOULDER PAIN: ICD-10-CM

## 2022-03-08 PROCEDURE — 97112 NEUROMUSCULAR REEDUCATION: CPT

## 2022-03-08 PROCEDURE — 97140 MANUAL THERAPY 1/> REGIONS: CPT

## 2022-03-08 PROCEDURE — 97110 THERAPEUTIC EXERCISES: CPT

## 2022-03-08 NOTE — PROGRESS NOTES
Daily Note     Today's date: 3/8/2022  Patient name: Víctor Decker  : 1955  MRN: [id number]  Referring provider: Radha Samuel, *  Dx:   Encounter Diagnosis     ICD-10-CM    1  Tendinitis of right rotator cuff  M75 81    2  Chronic right shoulder pain  M25 511     G89 29    3  Right rotator cuff tendonitis  M75 81                   Subjective: Pt reports that she feels alright  She states that she was sore after last session, but she felt good for a few days  Objective: See treatment diary below      Assessment: Tolerated treatment well  Patient had improvement in ROM following manual interventions  Patient demonstrated fatigue post treatment, exhibited good technique with therapeutic exercises and would benefit from continued PT to address ROM deficit and hypomobility in order to return to PLOF  Plan: Continue per plan of care        Precautions:     HEP: scap 4, thoracic ext, 1st rib mob, IR stretch  Manuals 2/21 2/28 3/4 3/8     2/17    GHJ mobs             Thoracic mobs PA gr4 P agr 4       PA gr 4    Lat release             STM Sub scap/scalenes JH subscap/scalenes JH       Sub scap/scalenes JH    scap mobs [facility] dep/retr   dep ret     JH    Post capsule stretch  Jh prone           1st rib mob [facility] NATHALY       JH    Ir MWM [facility]    T1-T2 mob   JH R SB/R ROT JH R SB/R ROT         Neuro Re-Ed             scap 4             Band pull aparts 3x10 RTB 3x10 GTB 3x10 GTB 3x10 GTB     3x10 RTB    No monies 3x10 RTB 3x10 GTB 3x10 GTB 3x10 GTB     3x10 RTB    Prone row, I , T, Y             KB BU                                       Ther Ex             UBE 3' F, 3' B 3' F, 3'B 3'f 3'b 3'f 3'b     3' fwd, 3' bwd    IR strap stretch             ER/IR             Open books 4' 2 ea side 4' 2 ea side 4' 2 ea side x20 ea side with IR     4' 2 ea side    Thoracic ext over pillow             IR stretch at wall/1st rib mob             Sleeper stretch  3x30s           [place] stretch 3# 10x10s 3# 10x10s       2# 10x10s    HEP review and pt education             Ther Activity                                       Gait Training                                       Modalities

## 2022-03-11 ENCOUNTER — OFFICE VISIT (OUTPATIENT)
Dept: PHYSICAL THERAPY | Facility: MEDICAL CENTER | Age: 67
End: 2022-03-11
Payer: COMMERCIAL

## 2022-03-11 DIAGNOSIS — M25.511 CHRONIC RIGHT SHOULDER PAIN: ICD-10-CM

## 2022-03-11 DIAGNOSIS — M75.81 TENDINITIS OF RIGHT ROTATOR CUFF: Primary | ICD-10-CM

## 2022-03-11 DIAGNOSIS — M75.81 RIGHT ROTATOR CUFF TENDONITIS: ICD-10-CM

## 2022-03-11 DIAGNOSIS — G89.29 CHRONIC RIGHT SHOULDER PAIN: ICD-10-CM

## 2022-03-11 PROCEDURE — 97140 MANUAL THERAPY 1/> REGIONS: CPT

## 2022-03-11 PROCEDURE — 97112 NEUROMUSCULAR REEDUCATION: CPT

## 2022-03-11 PROCEDURE — 97110 THERAPEUTIC EXERCISES: CPT

## 2022-03-11 NOTE — PROGRESS NOTES
Daily Note     Today's date: 3/11/2022  Patient name: Kitty Villalpando  : 1955  MRN: [id number]  Referring provider: Max Toribio, *  Dx:   Encounter Diagnosis     ICD-10-CM    1  Tendinitis of right rotator cuff  M75 81    2  Chronic right shoulder pain  M25 511     G89 29    3  Right rotator cuff tendonitis  M75 81                   Subjective: Pt reports that she feels okay  Objective: See treatment diary below      Assessment: Tolerated treatment well  Patient has improvement in restrictions in scapular mobility, however her BTB IR remains restricted  Patient demonstrated fatigue post treatment, exhibited good technique with therapeutic exercises and would benefit from continued PT to address ROM deficit and hypomobility in order to return to PLOF  Plan: Continue per plan of care        Precautions:     HEP: scap 4, thoracic ext, 1st rib mob, IR stretch  Manuals 2/21 2/28 3/4 3/8 3/11        GHJ mobs             Thoracic mobs PA gr4 P agr 4           Lat release             STM Sub scap/scalenes JH subscap/scalenes JH           scap mobs [facility] dep/retr   dep ret  dep ret        Post capsule stretch  Jh prone           1st rib mob [facility] JH           Ir MWM [facility] NATHALY JH           T1-T2 mob    R SB/R ROT JH R SB/R ROT JH R SB/R ROT        Neuro Re-Ed             scap 4             Band pull aparts 3x10 RTB 3x10 GTB 3x10 GTB 3x10 GTB 3x10 GTB        No monies 3x10 RTB 3x10 GTB 3x10 GTB 3x10 GTB 3x10 GTB        Prone row, I , T, Y             KB BU                                       Ther Ex             UBE 3' F, 3' B 3' F, 3'B 3'f 3'b 3'f 3'b 3' f 3'b        IR strap stretch             ER/IR             Open books 4' 2 ea side 4' 2 ea side 4' 2 ea side x20 ea side with IR x20 ea side with IR        Thoracic ext over pillow             IR stretch at wall/1st rib mob             Sleeper stretch  3x30s           [place] stretch 3# 10x10s 3# 10x10s           HEP review and pt education Ther Activity                                       Gait Training                                       Modalities

## 2022-03-15 ENCOUNTER — OFFICE VISIT (OUTPATIENT)
Dept: PHYSICAL THERAPY | Facility: MEDICAL CENTER | Age: 67
End: 2022-03-15
Payer: COMMERCIAL

## 2022-03-15 DIAGNOSIS — M75.81 TENDINITIS OF RIGHT ROTATOR CUFF: Primary | ICD-10-CM

## 2022-03-15 DIAGNOSIS — G89.29 CHRONIC RIGHT SHOULDER PAIN: ICD-10-CM

## 2022-03-15 DIAGNOSIS — M25.511 CHRONIC RIGHT SHOULDER PAIN: ICD-10-CM

## 2022-03-15 DIAGNOSIS — M75.81 RIGHT ROTATOR CUFF TENDONITIS: ICD-10-CM

## 2022-03-15 PROCEDURE — 97112 NEUROMUSCULAR REEDUCATION: CPT

## 2022-03-15 PROCEDURE — 97140 MANUAL THERAPY 1/> REGIONS: CPT

## 2022-03-15 PROCEDURE — 97110 THERAPEUTIC EXERCISES: CPT

## 2022-03-15 NOTE — PROGRESS NOTES
Daily Note     Today's date: 3/15/2022  Patient name: Iram Rosenbaum  : 1955  MRN: [id number]  Referring provider: Jeff Abrams, *  Dx:   Encounter Diagnosis     ICD-10-CM    1  Tendinitis of right rotator cuff  M75 81    2  Chronic right shoulder pain  M25 511     G89 29    3  Right rotator cuff tendonitis  M75 81                   Subjective: Pt reports that she feels okay  She reports that her anterior shoulder is tight and achy  She reports that she has had some referred pain into her biceps  Objective: See treatment diary below      Assessment: Tolerated treatment well  Patient exhibited pain with 90 abd and 90 ER  After posterior AC joint mob, post GHJ mob, and subscap release her symptoms improved  Pt demonstrates improvement in pain and function by end of session  Discussed careover with home with exercises  She demonstrates adherence to HEP  Patient demonstrated fatigue post treatment, exhibited good technique with therapeutic exercises and would benefit from continued PT to address ROM deficit and hypomobility in order to return to PLOF  Plan: Continue per plan of care        Precautions:     HEP: scap 4, thoracic ext, 1st rib mob, IR stretch  Manuals  3/4 3/8 3/11 3/15       GHJ mobs             Thoracic mobs PA gr4 P agr 4           Lat release             STM Sub scap/scalenes JH subscap/scalenes JH    subscap JH        scap mobs [facility] dep/retr   dep ret  dep ret  dep ret       Post capsule stretch  Jh prone           1st rib mob [facility] NATHALY JH           Ir MWM [facility] NATHALY JH           T1-T2 mob    R SB/R ROT  R SB/R ROT  R SB/R ROT  R SB/R ROT       AC joint mob      Post  with MET                    Neuro Re-Ed             scap 4             Band pull aparts 3x10 RTB 3x10 GTB 3x10 GTB 3x10 GTB 3x10 GTB 3x10 BTB       No monies 3x10 RTB 3x10 GTB 3x10 GTB 3x10 GTB 3x10 GTB 3x10 BTB       Prone row, I , T, Y             KB BU Ther Ex             UBE 3' F, 3' B 3' F, 3'B 3'f 3'b 3'f 3'b 3' f 3'b        IR strap stretch             ER/IR             Open books 4' 2 ea side 4' 2 ea side 4' 2 ea side x20 ea side with IR x20 ea side with IR x20 ea side with IR       Thoracic ext over pillow             IR stretch at wall/1st rib mob             Sleeper stretch  3x30s           [place] stretch 3# 10x10s 3# 10x10s           HEP review and pt education             Ther Activity                                       Gait Training                                       Modalities

## 2022-03-25 ENCOUNTER — OFFICE VISIT (OUTPATIENT)
Dept: PHYSICAL THERAPY | Facility: MEDICAL CENTER | Age: 67
End: 2022-03-25
Payer: COMMERCIAL

## 2022-03-25 DIAGNOSIS — M75.81 RIGHT ROTATOR CUFF TENDONITIS: ICD-10-CM

## 2022-03-25 DIAGNOSIS — G89.29 CHRONIC RIGHT SHOULDER PAIN: ICD-10-CM

## 2022-03-25 DIAGNOSIS — M75.81 TENDINITIS OF RIGHT ROTATOR CUFF: Primary | ICD-10-CM

## 2022-03-25 DIAGNOSIS — M25.511 CHRONIC RIGHT SHOULDER PAIN: ICD-10-CM

## 2022-03-25 PROCEDURE — 97110 THERAPEUTIC EXERCISES: CPT

## 2022-03-25 PROCEDURE — 97112 NEUROMUSCULAR REEDUCATION: CPT

## 2022-03-25 PROCEDURE — 97140 MANUAL THERAPY 1/> REGIONS: CPT

## 2022-03-25 NOTE — PROGRESS NOTES
Daily Note     Today's date: 3/25/2022  Patient name: Radha Ovalle  : 1955  MRN: [id number]  Referring provider: Mahesh Valdovinos, *  Dx:   Encounter Diagnosis     ICD-10-CM    1  Tendinitis of right rotator cuff  M75 81    2  Right rotator cuff tendonitis  M75 81    3  Chronic right shoulder pain  M25 511     G89 29                   Subjective: Pt reports that she feels okay  She reports that she feels like her shoulder is tight and stiff  She has had more pain this week because of carrying more than usual        Objective: See treatment diary below      Assessment: Pt had improvement in elevation ROM after lat stretch  Discussed continuing with mobility exercises while away  Tolerated treatment well  Patient demonstrated fatigue post treatment, exhibited good technique with therapeutic exercises and would benefit from continued PT to address ROM deficit and hypomobility in order to return to PLOF  Plan: Continue per plan of care        Precautions:     HEP: scap 4, thoracic ext, 1st rib mob, IR stretch  Manuals 2/21 2/28 3/4 3/8 3/11 3/15 3/25      GHJ mobs             Thoracic mobs PA gr4 P agr 4           Lat release             STM Sub scap/scalenes  subscap/scalenes     subscap   SS       scap mobs [facility] dep/retr   dep ret  dep ret  dep ret  dep ret      Post capsule stretch  Jh prone           1st rib mob [facility]           Ir MWM [facility]           T1-T2 mob    R SB/R ROT  R SB/R ROT  R SB/R ROT  R SB/R ROT  R SB/R ROT      AC joint mob      Post  with MET Post  with MET                   Neuro Re-Ed             scap 4             Band pull aparts 3x10 RTB 3x10 GTB 3x10 GTB 3x10 GTB 3x10 GTB 3x10 BTB 3x10 BTB      No monies 3x10 RTB 3x10 GTB 3x10 GTB 3x10 GTB 3x10 GTB 3x10 BTB 3x10 BTB      Prone row, I , T, Y             KB BU                                       Ther Ex             UBE 3' F, 3' B 3' F, 3'B 3'f 3'b 3'f 3'b 3' f 3'b        IR strap stretch ER/IR             Open books 4' 2 ea side 4' 2 ea side 4' 2 ea side x20 ea side with IR x20 ea side with IR x20 ea side with IR x20 ea side with IR      Thoracic ext over pillow             IR stretch at wall/1st rib mob             Sleeper stretch  3x30s           [place] stretch 3# 10x10s 3# 10x10s           Lat stretch       10x10s      HEP review and pt education             Ther Activity                                       Gait Training                                       Modalities

## 2022-04-05 ENCOUNTER — OFFICE VISIT (OUTPATIENT)
Dept: PHYSICAL THERAPY | Facility: MEDICAL CENTER | Age: 67
End: 2022-04-05
Payer: COMMERCIAL

## 2022-04-05 ENCOUNTER — OFFICE VISIT (OUTPATIENT)
Dept: OBGYN CLINIC | Facility: MEDICAL CENTER | Age: 67
End: 2022-04-05
Payer: COMMERCIAL

## 2022-04-05 VITALS
WEIGHT: 142 LBS | SYSTOLIC BLOOD PRESSURE: 138 MMHG | HEART RATE: 74 BPM | HEIGHT: 62 IN | DIASTOLIC BLOOD PRESSURE: 77 MMHG | BODY MASS INDEX: 26.13 KG/M2

## 2022-04-05 DIAGNOSIS — M75.81 RIGHT ROTATOR CUFF TENDONITIS: ICD-10-CM

## 2022-04-05 DIAGNOSIS — M75.81 RIGHT ROTATOR CUFF TENDONITIS: Primary | ICD-10-CM

## 2022-04-05 DIAGNOSIS — M25.511 CHRONIC RIGHT SHOULDER PAIN: ICD-10-CM

## 2022-04-05 DIAGNOSIS — G89.29 CHRONIC RIGHT SHOULDER PAIN: ICD-10-CM

## 2022-04-05 DIAGNOSIS — M75.81 TENDINITIS OF RIGHT ROTATOR CUFF: Primary | ICD-10-CM

## 2022-04-05 PROCEDURE — 3008F BODY MASS INDEX DOCD: CPT | Performed by: ORTHOPAEDIC SURGERY

## 2022-04-05 PROCEDURE — 97140 MANUAL THERAPY 1/> REGIONS: CPT

## 2022-04-05 PROCEDURE — 1036F TOBACCO NON-USER: CPT | Performed by: ORTHOPAEDIC SURGERY

## 2022-04-05 PROCEDURE — 99213 OFFICE O/P EST LOW 20 MIN: CPT | Performed by: ORTHOPAEDIC SURGERY

## 2022-04-05 NOTE — PROGRESS NOTES
Orthopaedic Surgery - Office Note  Cheryle Davis (84 y o  female)   : 1955   MRN: [id number]  Encounter Date: 2022    Chief Complaint   Patient presents with    Right Shoulder - Follow-up       Assessment / Plan  Right shoulder rotator cuff tendinitis and possible secondary adhesive capsulitis, concern for rotator cuff tear    · MRI of right shoulder, concern for possible rotator cuff tear  · Continue with outpatient PT and keep up with HEP as tolerated  · Activity as tolerated  · Continue with ice, heat and anti-inflammatories   · We can consider a CSI in the future   Return for after MRI for results  History of Present Illness  Cheryle Davis is a 61 y o  female who presents for follow up right shoulder rotator cuff tendinitis  Since her prior visit, she has been attending PT which she feels was improving her ROM and pain  She has completed 2 5 months of PT  However, she did recently take a 2 week vacation and does feels that her discomfort has returned  She continues to complain of anterior shoulder pain aggravated with ABD and IR  She feels that she has plateaued with her progress  She is interested in a MRI to further evaluate  She denies any radiating symptoms  Review of Systems  Pertinent items are noted in HPI  All other systems were reviewed and are negative  Physical Exam  /77   Pulse 74   Ht 5' 2" (1 575 m)   Wt 64 4 kg (142 lb)   BMI 25 97 kg/m²   Cons: Appears well  No apparent distress  Psych: Alert  Oriented x3  Mood and affect normal   Eyes: PERRLA, EOMI  Resp: Normal effort  No audible wheezing or stridor  CV: Palpable pulse  No discernable arrhythmia  No LE edema  Lymph:  No palpable cervical, axillary, or inguinal lymphadenopathy  Skin: Warm  No palpable masses  No visible lesions  Neuro: Normal muscle tone  Normal and symmetric DTR's  Right Shoulder Exam  Alignment / Posture:  Normal shoulder posture  Inspection:  No swelling   No ecchymosis  Palpation:  mild anterior shoulder and bicipital groove tenderness  No effusion  ROM:  Shoulder   Shoulder ER 60  Shoulder IR T10  Strength:  5/5 supraspinatus, infraspinatus, and subscapularis  Stability:  No objective shoulder instability  Tests: (+) Poole  Neurovascular:  Sensation intact in Ax/R/M/U nerve distributions  2+ radial pulse  Studies Reviewed  I have personally reviewed pertinent films in PACS  XR of right shoulder - Performed on 11/09/2021 reveal no acute osseousabnormality   Mild glenohumeral and AC joint osteoarthritis  Procedures  No procedures today  Medical, Surgical, Family, and Social History  The patient's medical history, family history, and social history, were reviewed and updated as appropriate  Past Medical History:   Diagnosis Date    Anxiety     last assessed 12/1/14, resolved 2/1/16    Closed displaced fracture of proximal phalanx of lesser toe of right foot 9/11/2020    Disease of thyroid gland     Headache syndrome 5/12/2017    Hematuria     last assessed 4/23/15, resolved 2/1/16    Left breast mass 12/18/2019     Questionable mass on exam 2019 May be patch of more cystic breast tissue, not well defined   Will check dx mammo  Mammogram was normal 9/20    PONV (postoperative nausea and vomiting)     Skin neoplasm     last assessed 9/18/15, resolved 2/1/16       Past Surgical History:   Procedure Laterality Date    COLONOSCOPY      HYSTEROSCOPY WITH  RESECTION UTERINE TUMOR/FIBROID  03/2020    SHOULDER SURGERY Left 2009    tear repair    TOOTH EXTRACTION         Family History   Problem Relation Age of Onset    Hyperlipidemia Mother     Cancer Father         of spine    Stroke Father         syndrome     No Known Problems Sister     Breast cancer Maternal Aunt 72    No Known Problems Maternal Aunt        Social History     Occupational History    Not on file   Tobacco Use    Smoking status: Former Smoker     Types: Cigarettes Quit date: 1990     Years since quittin 2    Smokeless tobacco: Never Used   Vaping Use    Vaping Use: Never used   Substance and Sexual Activity    Alcohol use:  Yes     Alcohol/week: 5 0 standard drinks     Types: 5 Glasses of wine per week     Comment: occasionally / social     Drug use: No    Sexual activity: Yes     Partners: Male       Allergies   Allergen Reactions    Sulfa Antibiotics Rash         Current Outpatient Medications:     Cholecalciferol (Vitamin D3) 50 MCG (2000 UT) capsule, Take 1 capsule (2,000 Units total) by mouth daily, Disp: , Rfl: 0    ibuprofen (MOTRIN) 200 mg tablet, Take 200 mg by mouth every 6 (six) hours as needed, Disp: , Rfl:     levothyroxine 75 mcg tablet, Take 1 tablet (75 mcg total) by mouth daily, Disp: 30 tablet, Rfl: 11    estradiol (ESTRACE) 0 1 mg/g vaginal cream, Has on hand, has not started yet (2021) (Patient not taking: Reported on 2022), Disp: , Rfl:     fluticasone (FLONASE) 50 mcg/act nasal spray, 1 spray into each nostril (Patient not taking: Reported on 2022 ), Disp: , Rfl:     mometasone (ELOCON) 0 1 % lotion, Apply topically daily To affected ear canal (Patient not taking: Reported on 2022 ), Disp: 60 mL, Rfl: 0      [employer]    I,:  Ozzie Quick am acting as a scribe while in the presence of the attending physician :       I,:  Curt Rothman MD personally performed the services described in this documentation    as scribed in my presence :

## 2022-04-05 NOTE — PROGRESS NOTES
Daily Note     Today's date: 2022  Patient name: Lesli Solis  : 1955  MRN: [id number]  Referring provider: Aurea Murray, *  Dx:   Encounter Diagnosis     ICD-10-CM    1  Tendinitis of right rotator cuff  M75 81    2  Right rotator cuff tendonitis  M75 81    3  Chronic right shoulder pain  M25 511     G89 29                   Subjective: Pt reports that she has been in pain for a week and a half  She reports her biceps pain is back and has been bothering her with IR and ext  Objective: See treatment diary below      Assessment: Pt presents with increased stiffness and pain  Her s/s seem consistent with impingement of biceps tendon  Performed manual interventions to improvement mobility  Patient's symptoms improved by end of session  Tolerated treatment well  Patient demonstrated fatigue post treatment, exhibited good technique with therapeutic exercises and would benefit from continued PT to address ROM deficit and hypomobility in order to return to PLOF  Plan: Continue per plan of care        Precautions:     HEP: scap 4, thoracic ext, 1st rib mob, IR stretch  Manuals 2/21 2/28 3/4 3/8 3/11 3/15 3/25 4/5     GHJ mobs        JH post, inf     Thoracic mobs PA gr4 P agr 4           Lat release             STM Sub scap/scalenes JH subscap/scalenes JH    subscap   SS Jh pec minor JH     scap mobs [facility] dep/retr   dep ret  dep ret  dep ret  dep ret  dep ret     Post capsule stretch  Jh prone           1st rib mob [facility] NATHALY JH           Ir MWM [facility] NATHALY JH           T1-T2 mob    R SB/R ROT  R SB/R ROT  R SB/R ROT  R SB/R ROT  R SB/R ROT  R SB/R ROT     AC joint mob      Post  with MET Post  with MET Post  with MET                  Neuro Re-Ed             scap 4             Band pull aparts 3x10 RTB 3x10 GTB 3x10 GTB 3x10 GTB 3x10 GTB 3x10 BTB 3x10 BTB 3x10 BTB     No monies 3x10 RTB 3x10 GTB 3x10 GTB 3x10 GTB 3x10 GTB 3x10 BTB 3x10 BTB 3x10 BTB     Prone row, I , T, Y KB BU                                       Ther Ex             UBE 3' F, 3' B 3' F, 3'B 3'f 3'b 3'f 3'b 3' f 3'b        IR strap stretch             ER/IR             Open books 4' 2 ea side 4' 2 ea side 4' 2 ea side x20 ea side with IR x20 ea side with IR x20 ea side with IR x20 ea side with IR x20 ea side with IR     Thoracic ext over pillow             IR stretch at wall/1st rib mob             Sleeper stretch  3x30s           Wand OH stretch 3# 10x10s 3# 10x10s           Lat stretch       10x10s      HEP review and pt education             Ther Activity                                       Gait Training                                       Modalities

## 2022-04-08 ENCOUNTER — OFFICE VISIT (OUTPATIENT)
Dept: PHYSICAL THERAPY | Facility: MEDICAL CENTER | Age: 67
End: 2022-04-08
Payer: COMMERCIAL

## 2022-04-08 DIAGNOSIS — M25.511 CHRONIC RIGHT SHOULDER PAIN: ICD-10-CM

## 2022-04-08 DIAGNOSIS — G89.29 CHRONIC RIGHT SHOULDER PAIN: ICD-10-CM

## 2022-04-08 DIAGNOSIS — M75.81 RIGHT ROTATOR CUFF TENDONITIS: ICD-10-CM

## 2022-04-08 DIAGNOSIS — M75.81 TENDINITIS OF RIGHT ROTATOR CUFF: Primary | ICD-10-CM

## 2022-04-08 PROCEDURE — 97140 MANUAL THERAPY 1/> REGIONS: CPT

## 2022-04-08 NOTE — PROGRESS NOTES
Daily Note     Today's date: 2022  Patient name: David Salmeron  : 1955  MRN: [id number]  Referring provider: Nicole Herrmann, *  Dx:   Encounter Diagnosis     ICD-10-CM    1  Tendinitis of right rotator cuff  M75 81    2  Right rotator cuff tendonitis  M75 81    3  Chronic right shoulder pain  M25 511     G89 29                   Subjective: Pt reports that she feels a little bit better  She reports that she had her follow up with Dr Antonieta Vyas and has a MRI scheduled  She states that she wants to take a break from PT until her results are in        Objective: See treatment diary below      Assessment: Pt presents with improvement in symptoms  Patient had improvement in ROM after manual interventions  Discussed taking a break from therapy until MRI results and follow up with referring provider  Tolerated treatment well  Patient demonstrated fatigue post treatment, exhibited good technique with therapeutic exercises and would benefit from continued PT to address ROM deficit and hypomobility in order to return to PLOF         Plan: Hiatus from PT until MRI and follow up with Dr Antonieta Vyas     Precautions:     HEP: scap 4, thoracic ext, 1st rib mob, IR stretch  Manuals  3/4 3/8 3/11 3/15 3/25 4/5 4/8    GHJ mobs        JH post, inf Jh post, inf, ant    Thoracic mobs PA gr4 P agr 4           Lat release             STM Sub scap/scalenes JH subscap/scalenes JH    subscap JH  SS Jh pec minor JH pec minor JH    scap mobs [facility] dep/retr   dep ret  dep ret JH dep ret  dep ret JH dep ret JH dep ret    Post capsule stretch  Jh prone           1st rib mob [facility] NATHALY JH           Ir MWM [facility] NATHALY JH           T1-T2 mob    R SB/R ROT  R SB/R ROT  R SB/R ROT JH R SB/R ROT  R SB/R ROT  R SB/R ROT  R SB/R ROT    AC joint mob      Post JH with MET Post JH with MET Post JH with MET Post  with MEt                 Neuro Re-Ed             scap 4             Band pull aparts 3x10 RTB 3x10 GTB 3x10 GTB 3x10 GTB 3x10 GTB 3x10 BTB 3x10 BTB 3x10 BTB     No monies 3x10 RTB 3x10 GTB 3x10 GTB 3x10 GTB 3x10 GTB 3x10 BTB 3x10 BTB 3x10 BTB     Prone row, I , T, Y             KB BU                                       Ther Ex             UBE 3' F, 3' B 3' F, 3'B 3'f 3'b 3'f 3'b 3' f 3'b        IR strap stretch             ER/IR             Open books 4' 2 ea side 4' 2 ea side 4' 2 ea side x20 ea side with IR x20 ea side with IR x20 ea side with IR x20 ea side with IR x20 ea side with IR     Thoracic ext over pillow             IR stretch at wall/1st rib mob             Sleeper stretch  3x30s           Wand OH stretch 3# 10x10s 3# 10x10s           Lat stretch       10x10s      HEP review and pt education             Ther Activity                                       Gait Training                                       Modalities

## 2022-04-27 ENCOUNTER — HOSPITAL ENCOUNTER (OUTPATIENT)
Dept: MRI IMAGING | Facility: HOSPITAL | Age: 67
Discharge: HOME | End: 2022-04-27
Attending: ORTHOPAEDIC SURGERY
Payer: COMMERCIAL

## 2022-04-27 DIAGNOSIS — G89.29 CHRONIC RIGHT SHOULDER PAIN: ICD-10-CM

## 2022-04-27 DIAGNOSIS — M75.81 RIGHT ROTATOR CUFF TENDONITIS: ICD-10-CM

## 2022-04-27 DIAGNOSIS — M25.511 CHRONIC RIGHT SHOULDER PAIN: ICD-10-CM

## 2022-04-27 PROCEDURE — 73221 MRI JOINT UPR EXTREM W/O DYE: CPT

## 2022-04-27 PROCEDURE — G1004 CDSM NDSC: HCPCS

## 2022-05-03 ENCOUNTER — OFFICE VISIT (OUTPATIENT)
Dept: OBGYN CLINIC | Facility: MEDICAL CENTER | Age: 67
End: 2022-05-03
Payer: COMMERCIAL

## 2022-05-03 VITALS
HEART RATE: 69 BPM | DIASTOLIC BLOOD PRESSURE: 80 MMHG | BODY MASS INDEX: 26.13 KG/M2 | SYSTOLIC BLOOD PRESSURE: 129 MMHG | WEIGHT: 142 LBS | HEIGHT: 62 IN

## 2022-05-03 DIAGNOSIS — M75.31 CALCIFIC TENDONITIS OF RIGHT SHOULDER: ICD-10-CM

## 2022-05-03 DIAGNOSIS — M75.81 RIGHT ROTATOR CUFF TENDONITIS: Primary | ICD-10-CM

## 2022-05-03 PROCEDURE — 99213 OFFICE O/P EST LOW 20 MIN: CPT | Performed by: ORTHOPAEDIC SURGERY

## 2022-05-03 PROCEDURE — 1036F TOBACCO NON-USER: CPT | Performed by: ORTHOPAEDIC SURGERY

## 2022-05-03 PROCEDURE — 3008F BODY MASS INDEX DOCD: CPT | Performed by: ORTHOPAEDIC SURGERY

## 2022-05-03 NOTE — PROGRESS NOTES
Orthopaedic Surgery - Office Note  Alysa Dumont (89 y o  female)   : 1955   MRN: [id number]  Encounter Date: 5/3/2022    Chief Complaint   Patient presents with    Right Shoulder - Follow-up       Assessment / Plan  Right shoulder rotator cuff tendinitis and calcific tendonitis     · Activity as tolerated   · Discussed with the patient that calcific tendonitis will more than likely resolve on its own with gentle stretching  Patient was educated that there may be a small increase in pain when the calcium begins to reabsorb        · Continue with HEP, she should focus on gentle ROM  · We can consider a CSI in the future if symptoms worsen   Return if symptoms worsen or fail to improve  History of Present Illness  Alysa Dumont is a 61 y o  female who presents for follow up right shoulder rotator cuff tendinitis and MRI results  She did complete 2 5 months of formal PT and does keep up with her HEP  She did recently take a break from her exercises to try and relief discomfort which was successful  She still has discomfort with IR  Her ROM has remained about the same  She is not interested in a CSI at this time  She denies pain at rest or at night  Overall, she does feel that she is progressing slowly  Review of Systems  Pertinent items are noted in HPI  All other systems were reviewed and are negative  Physical Exam  /80   Pulse 69   Ht 5' 2" (1 575 m)   Wt 64 4 kg (142 lb)   BMI 25 97 kg/m²   Cons: Appears well  No apparent distress  Psych: Alert  Oriented x3  Mood and affect normal   Eyes: PERRLA, EOMI  Resp: Normal effort  No audible wheezing or stridor  CV: Palpable pulse  No discernable arrhythmia  No LE edema  Lymph:  No palpable cervical, axillary, or inguinal lymphadenopathy  Skin: Warm  No palpable masses  No visible lesions  Neuro: Normal muscle tone  Normal and symmetric DTR's  Right Shoulder Exam  Alignment / Posture:  Normal shoulder posture    Inspection: No swelling  No ecchymosis  Palpation:  No tenderness  No effusion  ROM:  Shoulder   Shoulder ER 60  Shoulder IR T10  Strength:  5/5 supraspinatus, infraspinatus, and subscapularis  Stability:  No objective shoulder instability  Tests: (+) Poole  (+) Neer  Neurovascular:  Sensation intact in Ax/R/M/U nerve distributions  2+ radial pulse  Studies Reviewed  I have personally reviewed pertinent films in PACS  XR of right shoulder - Performed on 11/09/2021 reveal no acute osseous abnormality   Mild glenohumeral and AC joint osteoarthritis  MRI of right shoulder- images from 04/27/2022 which show insertional supraspinatus calcific tendinitis  Intact rotator cuff and long head of the biceps     Procedures  No procedures today  Medical, Surgical, Family, and Social History  The patient's medical history, family history, and social history, were reviewed and updated as appropriate  Past Medical History:   Diagnosis Date    Anxiety     last assessed 12/1/14, resolved 2/1/16    Closed displaced fracture of proximal phalanx of lesser toe of right foot 9/11/2020    Disease of thyroid gland     Headache syndrome 5/12/2017    Hematuria     last assessed 4/23/15, resolved 2/1/16    Left breast mass 12/18/2019     Questionable mass on exam 2019 May be patch of more cystic breast tissue, not well defined   Will check dx mammo  Mammogram was normal 9/20    PONV (postoperative nausea and vomiting)     Skin neoplasm     last assessed 9/18/15, resolved 2/1/16       Past Surgical History:   Procedure Laterality Date    COLONOSCOPY      HYSTEROSCOPY WITH  RESECTION UTERINE TUMOR/FIBROID  03/2020    SHOULDER SURGERY Left 2009    tear repair    TOOTH EXTRACTION         Family History   Problem Relation Age of Onset    Hyperlipidemia Mother     Cancer Father         of spine    Stroke Father         syndrome     No Known Problems Sister     Breast cancer Maternal Aunt 72    No Known Problems Maternal Aunt        Social History     Occupational History    Not on file   Tobacco Use    Smoking status: Former Smoker     Types: Cigarettes     Quit date: 1990     Years since quittin 3    Smokeless tobacco: Never Used   Vaping Use    Vaping Use: Never used   Substance and Sexual Activity    Alcohol use:  Yes     Alcohol/week: 5 0 standard drinks     Types: 5 Glasses of wine per week     Comment: occasionally / social     Drug use: No    Sexual activity: Yes     Partners: Male       Allergies   Allergen Reactions    Sulfa Antibiotics Rash         Current Outpatient Medications:     Cholecalciferol (Vitamin D3) 50 MCG (2000 UT) capsule, Take 1 capsule (2,000 Units total) by mouth daily, Disp: , Rfl: 0    ibuprofen (MOTRIN) 200 mg tablet, Take 200 mg by mouth every 6 (six) hours as needed, Disp: , Rfl:     levothyroxine 75 mcg tablet, Take 1 tablet (75 mcg total) by mouth daily, Disp: 30 tablet, Rfl: 11    estradiol (ESTRACE) 0 1 mg/g vaginal cream, Has on hand, has not started yet (2021) (Patient not taking: Reported on 2022), Disp: , Rfl:     fluticasone (FLONASE) 50 mcg/act nasal spray, 1 spray into each nostril (Patient not taking: Reported on 2022 ), Disp: , Rfl:     mometasone (ELOCON) 0 1 % lotion, Apply topically daily To affected ear canal (Patient not taking: Reported on 2022 ), Disp: 60 mL, Rfl: 0      [employer]    I,:  Bethany Rojas am acting as a scribe while in the presence of the attending physician :       I,:  Leo Conrad MD personally performed the services described in this documentation    as scribed in my presence :

## 2022-05-05 ENCOUNTER — OFFICE VISIT (OUTPATIENT)
Dept: PHYSICAL THERAPY | Facility: MEDICAL CENTER | Age: 67
End: 2022-05-05
Payer: COMMERCIAL

## 2022-05-05 DIAGNOSIS — M75.81 TENDINITIS OF RIGHT ROTATOR CUFF: Primary | ICD-10-CM

## 2022-05-05 DIAGNOSIS — M25.511 CHRONIC RIGHT SHOULDER PAIN: ICD-10-CM

## 2022-05-05 DIAGNOSIS — M75.81 RIGHT ROTATOR CUFF TENDONITIS: ICD-10-CM

## 2022-05-05 DIAGNOSIS — G89.29 CHRONIC RIGHT SHOULDER PAIN: ICD-10-CM

## 2022-05-05 PROCEDURE — 97140 MANUAL THERAPY 1/> REGIONS: CPT

## 2022-05-05 PROCEDURE — 97110 THERAPEUTIC EXERCISES: CPT

## 2022-05-05 NOTE — PROGRESS NOTES
Daily Note     Today's date: 2022  Patient name: Tatiana Jordan  : 1955  MRN: [id number]  Referring provider: Sergio Wyatt, *  Dx:   Encounter Diagnosis     ICD-10-CM    1  Tendinitis of right rotator cuff  M75 81    2  Right rotator cuff tendonitis  M75 81    3  Chronic right shoulder pain  M25 511     G89 29                   Subjective: Pt reports that she feels better since taking a break from her exercises  Objective: See treatment diary below      Assessment: Tatiana Jordan has been compliant with attending PT and home exercise program since initial eval   Reeda Sour  has made improvements in objective data since initial evalulation and has achieved all goals  Patient reports having returned to their prior level or function  Patient provided with updated Home Exercise Program, all questions answered, verbalized understanding and agreement to plan of care  Thus it was mutually decided to discontinue this episode of care and transition to Home Exercise Program       Plan: Discharge to HEP        Precautions:     HEP: scap 4, thoracic ext, 1st rib mob, IR stretch  Manuals 2/21 2/28 3/4 3/8 3/11 3/15 3/25 4/5 4/8 5/5   GHJ mobs        JH post, inf Jh post, inf, ant JH post, inf   Thoracic mobs PA gr4 P agr 4           Lat release             STM Sub scap/scalenes  subscap/scalenes     subscap   SS  pec minor JH pec minor [facility] subscap   scap mobs [facility] dep/retr   dep ret  dep ret  dep ret  dep ret  dep ret  dep ret    Post capsule stretch   prone           1st rib mob [facility]           Ir MWM [facility]           T1-T2 mob    R SB/R ROT  R SB/R ROT  R SB/R ROT  R SB/R ROT  R SB/R ROT  R SB/R ROT  R SB/R ROT    AC joint mob      Post  with MET Post  with MET Post  with MET Post  with MEt Post                 Neuro Re-Ed             scap 4             Band pull aparts 3x10 RTB 3x10 GTB 3x10 GTB 3x10 GTB 3x10 GTB 3x10 BTB 3x10 BTB 3x10 BTB     No monies Supply/Equipment Prescribed  ----------------------------------------    PULSE OXIMETER, WITH TIFER     1. Please describe pertinent medical history that explains need for oximeter: recent COVID-19 infection, SOPHIA  2. What is the plan of care to be used in case of desaturation?: call primary clinic  3. What other equipment is being used and how will this work together for plan of care?: home BP monitor, home CPAP for sleep apnea  4. What model of oximeter is requested?: standard, no preference  5. Why would less costly oximeters not be suitable to meet patient's needs?: standard less costly oximeter is fine  6. How will oximeter be used? Overnight.  Continuous use.   Intermittent use.   7. Estimated Length of Need (Months):__3____1-99 (99=Lifetime)   Comments/Justification:     David Vera, DO     3x10 RTB 3x10 GTB 3x10 GTB 3x10 GTB 3x10 GTB 3x10 BTB 3x10 BTB 3x10 BTB     Prone row, I , T, Y             KB BU                                       Ther Ex             UBE 3' F, 3' B 3' F, 3'B 3'f 3'b 3'f 3'b 3' f 3'b        IR strap stretch             ER/IR             Open books 4' 2 ea side 4' 2 ea side 4' 2 ea side x20 ea side with IR x20 ea side with IR x20 ea side with IR x20 ea side with IR x20 ea side with IR     Thoracic ext over pillow             IR stretch at wall/1st rib mob             Sleeper stretch  3x30s           Wand OH stretch 3# 10x10s 3# 10x10s           Lat stretch       10x10s      HEP review and pt education          25'   Ther Activity                                       Gait Training                                       Modalities

## 2022-06-09 ENCOUNTER — APPOINTMENT (OUTPATIENT)
Dept: LAB | Facility: CLINIC | Age: 67
End: 2022-06-09
Payer: COMMERCIAL

## 2022-06-09 DIAGNOSIS — E55.9 VITAMIN D DEFICIENCY: ICD-10-CM

## 2022-06-09 DIAGNOSIS — K59.09 CHRONIC CONSTIPATION: ICD-10-CM

## 2022-06-09 DIAGNOSIS — R73.9 HYPERGLYCEMIA: ICD-10-CM

## 2022-06-09 DIAGNOSIS — E78.00 HYPERCHOLESTEROLEMIA: ICD-10-CM

## 2022-06-09 DIAGNOSIS — E03.9 ACQUIRED HYPOTHYROIDISM: ICD-10-CM

## 2022-06-09 LAB
25(OH)D3 SERPL-MCNC: 28.8 NG/ML (ref 30–100)
ALBUMIN SERPL BCP-MCNC: 3.9 G/DL (ref 3.5–5)
ALP SERPL-CCNC: 58 U/L (ref 46–116)
ALT SERPL W P-5'-P-CCNC: 27 U/L (ref 12–78)
ANION GAP SERPL CALCULATED.3IONS-SCNC: 7 MMOL/L (ref 4–13)
AST SERPL W P-5'-P-CCNC: 15 U/L (ref 5–45)
BASOPHILS # BLD AUTO: 0.03 THOUSANDS/ΜL (ref 0–0.1)
BASOPHILS NFR BLD AUTO: 1 % (ref 0–1)
BILIRUB SERPL-MCNC: 1.03 MG/DL (ref 0.2–1)
BUN SERPL-MCNC: 11 MG/DL (ref 5–25)
CALCIUM SERPL-MCNC: 9.5 MG/DL (ref 8.3–10.1)
CHLORIDE SERPL-SCNC: 106 MMOL/L (ref 100–108)
CHOLEST SERPL-MCNC: 221 MG/DL
CO2 SERPL-SCNC: 27 MMOL/L (ref 21–32)
CREAT SERPL-MCNC: 0.53 MG/DL (ref 0.6–1.3)
EOSINOPHIL # BLD AUTO: 0.03 THOUSAND/ΜL (ref 0–0.61)
EOSINOPHIL NFR BLD AUTO: 1 % (ref 0–6)
ERYTHROCYTE [DISTWIDTH] IN BLOOD BY AUTOMATED COUNT: 12.5 % (ref 11.6–15.1)
EST. AVERAGE GLUCOSE BLD GHB EST-MCNC: 105 MG/DL
GFR SERPL CREATININE-BSD FRML MDRD: 100 ML/MIN/1.73SQ M
GLUCOSE P FAST SERPL-MCNC: 94 MG/DL (ref 65–99)
HBA1C MFR BLD: 5.3 %
HCT VFR BLD AUTO: 39.6 % (ref 34.8–46.1)
HDLC SERPL-MCNC: 50 MG/DL
HGB BLD-MCNC: 13 G/DL (ref 11.5–15.4)
IMM GRANULOCYTES # BLD AUTO: 0.01 THOUSAND/UL (ref 0–0.2)
IMM GRANULOCYTES NFR BLD AUTO: 0 % (ref 0–2)
LDLC SERPL CALC-MCNC: 139 MG/DL (ref 0–100)
LYMPHOCYTES # BLD AUTO: 1.92 THOUSANDS/ΜL (ref 0.6–4.47)
LYMPHOCYTES NFR BLD AUTO: 45 % (ref 14–44)
MCH RBC QN AUTO: 31.7 PG (ref 26.8–34.3)
MCHC RBC AUTO-ENTMCNC: 32.8 G/DL (ref 31.4–37.4)
MCV RBC AUTO: 97 FL (ref 82–98)
MONOCYTES # BLD AUTO: 0.34 THOUSAND/ΜL (ref 0.17–1.22)
MONOCYTES NFR BLD AUTO: 8 % (ref 4–12)
NEUTROPHILS # BLD AUTO: 1.86 THOUSANDS/ΜL (ref 1.85–7.62)
NEUTS SEG NFR BLD AUTO: 45 % (ref 43–75)
NRBC BLD AUTO-RTO: 0 /100 WBCS
PLATELET # BLD AUTO: 284 THOUSANDS/UL (ref 149–390)
PMV BLD AUTO: 9.8 FL (ref 8.9–12.7)
POTASSIUM SERPL-SCNC: 4 MMOL/L (ref 3.5–5.3)
PROT SERPL-MCNC: 7 G/DL (ref 6.4–8.2)
RBC # BLD AUTO: 4.1 MILLION/UL (ref 3.81–5.12)
SODIUM SERPL-SCNC: 140 MMOL/L (ref 136–145)
TRIGL SERPL-MCNC: 162 MG/DL
TSH SERPL DL<=0.05 MIU/L-ACNC: 1.41 UIU/ML (ref 0.45–4.5)
WBC # BLD AUTO: 4.19 THOUSAND/UL (ref 4.31–10.16)

## 2022-06-09 PROCEDURE — 82306 VITAMIN D 25 HYDROXY: CPT

## 2022-06-09 PROCEDURE — 84443 ASSAY THYROID STIM HORMONE: CPT

## 2022-06-09 PROCEDURE — 80061 LIPID PANEL: CPT

## 2022-06-09 PROCEDURE — 85025 COMPLETE CBC W/AUTO DIFF WBC: CPT

## 2022-06-09 PROCEDURE — 80053 COMPREHEN METABOLIC PANEL: CPT

## 2022-06-09 PROCEDURE — 36415 COLL VENOUS BLD VENIPUNCTURE: CPT

## 2022-06-09 PROCEDURE — 83036 HEMOGLOBIN GLYCOSYLATED A1C: CPT

## 2022-06-10 ENCOUNTER — OFFICE VISIT (OUTPATIENT)
Dept: FAMILY MEDICINE CLINIC | Facility: CLINIC | Age: 67
End: 2022-06-10
Payer: COMMERCIAL

## 2022-06-10 VITALS
WEIGHT: 139 LBS | DIASTOLIC BLOOD PRESSURE: 78 MMHG | OXYGEN SATURATION: 99 % | SYSTOLIC BLOOD PRESSURE: 112 MMHG | HEART RATE: 64 BPM | BODY MASS INDEX: 25.58 KG/M2 | RESPIRATION RATE: 18 BRPM | HEIGHT: 62 IN

## 2022-06-10 DIAGNOSIS — E03.9 ACQUIRED HYPOTHYROIDISM: ICD-10-CM

## 2022-06-10 DIAGNOSIS — Z00.00 ANNUAL PHYSICAL EXAM: Primary | ICD-10-CM

## 2022-06-10 DIAGNOSIS — K59.09 CHRONIC CONSTIPATION: ICD-10-CM

## 2022-06-10 DIAGNOSIS — R73.9 HYPERGLYCEMIA: ICD-10-CM

## 2022-06-10 DIAGNOSIS — R31.0 GROSS HEMATURIA: ICD-10-CM

## 2022-06-10 DIAGNOSIS — M62.89 PELVIC FLOOR DYSFUNCTION: ICD-10-CM

## 2022-06-10 DIAGNOSIS — R13.19 ESOPHAGEAL DYSPHAGIA: ICD-10-CM

## 2022-06-10 DIAGNOSIS — E55.9 VITAMIN D DEFICIENCY: ICD-10-CM

## 2022-06-10 DIAGNOSIS — Z78.0 POST-MENOPAUSAL: ICD-10-CM

## 2022-06-10 DIAGNOSIS — M75.81 TENDINITIS OF RIGHT ROTATOR CUFF: ICD-10-CM

## 2022-06-10 DIAGNOSIS — E78.00 HYPERCHOLESTEROLEMIA: ICD-10-CM

## 2022-06-10 PROCEDURE — 99396 PREV VISIT EST AGE 40-64: CPT | Performed by: FAMILY MEDICINE

## 2022-06-10 PROCEDURE — 3725F SCREEN DEPRESSION PERFORMED: CPT | Performed by: FAMILY MEDICINE

## 2022-06-10 PROCEDURE — 3008F BODY MASS INDEX DOCD: CPT | Performed by: FAMILY MEDICINE

## 2022-06-10 RX ORDER — MELOXICAM 15 MG/1
15 TABLET ORAL DAILY
Qty: 30 TABLET | Refills: 1 | Status: SHIPPED | OUTPATIENT
Start: 2022-06-10

## 2022-06-10 RX ORDER — LEVOTHYROXINE SODIUM 0.07 MG/1
75 TABLET ORAL DAILY
Qty: 90 TABLET | Refills: 3 | Status: SHIPPED | OUTPATIENT
Start: 2022-06-10

## 2022-06-10 NOTE — PROGRESS NOTES
ADULT ANNUAL [facility]    NAME: Wende Hodgkin  AGE: 61 y o  SEX: female  : 1955     DATE: 6/10/2022     Assessment and Plan:     Problem List Items Addressed This Visit        Digestive    Chronic constipation     Stable at this time  Esophageal dysphagia     She has had no significant dysphagia but does occasionally feel as though she has difficulty swallowing  She declines further follow-up  She did have a normal EGD in 2020  Endocrine    Acquired hypothyroidism     Continue monitoring TSH  Continue levothyroxine           Relevant Medications    levothyroxine 75 mcg tablet    Other Relevant Orders    TSH, 3rd generation with Free T4 reflex       Musculoskeletal and Integument    Pelvic floor dysfunction     She was noted to have a potential uterine prolapse during her recent cystoscopy  Fortunately, she is having no further hematuria  I am going to have her see Gynecology  Tendinitis of right rotator cuff     She has completed physical therapy for now  I did prescribe meloxicam to use as needed  She also be a good candidate for an injection and she will consider that as well  Relevant Medications    meloxicam (MOBIC) 15 mg tablet       Genitourinary    Gross hematuria    Relevant Orders    CBC and differential       Other    Hypercholesterolemia    Relevant Orders    Comprehensive metabolic panel    CBC and differential    Vitamin D deficiency    Relevant Orders    Vitamin D 25 hydroxy    Hyperglycemia    Relevant Orders    Hemoglobin A1C      Other Visit Diagnoses     Annual physical exam    -  Primary    Relevant Orders    Ambulatory referral to Obstetrics / Gynecology    Post-menopausal        Relevant Orders    DXA bone density spine hip and pelvis          Immunizations and preventive care screenings were discussed with patient today   Appropriate education was printed on patient's after visit summary  Counseling:  Alcohol/drug use: discussed moderation in alcohol intake, the recommendations for healthy alcohol use, and avoidance of illicit drug use  Dental Health: discussed importance of regular tooth brushing, flossing, and dental visits  Exercise: the importance of regular exercise/physical activity was discussed  Recommend exercise 3-5 times per week for at least 30 minutes  Return in about 6 months (around 12/10/2022)  Chief Complaint:     Chief Complaint   Patient presents with    Annual Exam      History of Present Illness:     Adult Annual Physical   Patient here for a comprehensive physical exam  The patient reports that she is still having R shoulder pain     She did see Orthopedics and underwent physical therapy  She has declined an injection at this time  Pain is worse with overhead reaching  She has history of hypothyroidism and denies excessive fatigue, constipation or dry skin  She had an episode hematuria with a history of UTI  Fortunately, recent workup with Urology was negative  Diet and Physical Activity  Diet/Nutrition: well balanced diet  Exercise: moderate cardiovascular exercise  Depression Screening  PHQ-2/9 Depression Screening    Little interest or pleasure in doing things: 0 - not at all  Feeling down, depressed, or hopeless: 0 - not at all  PHQ-2 Score: 0  PHQ-2 Interpretation: Negative depression screen       General Health  Sleep: sleeps well  Hearing: normal - bilateral   Vision: no vision problems  Dental: regular dental visits  /GYN Health  Patient is: postmenopausal     Review of Systems:     Review of Systems   Constitutional: Negative for appetite change, chills, fatigue, fever and unexpected weight change  HENT: Negative for trouble swallowing  Eyes: Negative for visual disturbance  Respiratory: Negative for cough, chest tightness, shortness of breath and wheezing      Cardiovascular: Negative for chest pain, palpitations and leg swelling  Gastrointestinal: Negative for abdominal distention, abdominal pain, blood in stool, constipation and diarrhea  Endocrine: Negative for polyuria  Genitourinary: Negative for difficulty urinating and flank pain  Musculoskeletal: Negative for arthralgias and myalgias  Skin: Negative for rash  Neurological: Negative for dizziness and light-headedness  Hematological: Negative for adenopathy  Does not bruise/bleed easily  Psychiatric/Behavioral: Negative for dysphoric mood and sleep disturbance  The patient is not nervous/anxious  Past Medical History:     Past Medical History:   Diagnosis Date    Anxiety     last assessed 14, resolved 16    Closed displaced fracture of proximal phalanx of lesser toe of right foot 2020    Disease of thyroid gland     Headache syndrome 2017    Hematuria     last assessed 4/23/15, resolved 16    Left breast mass 2019     Questionable mass on exam 2019 May be patch of more cystic breast tissue, not well defined  Will check dx mammo  Mammogram was normal     PONV (postoperative nausea and vomiting)     Skin neoplasm     last assessed 9/18/15, resolved 16      Past Surgical History:     Past Surgical History:   Procedure Laterality Date    COLONOSCOPY      HYSTEROSCOPY WITH  RESECTION UTERINE TUMOR/FIBROID  2020    SHOULDER SURGERY Left 2009    tear repair    TOOTH EXTRACTION        Social History:     Social History     Socioeconomic History    Marital status:      Spouse name: None    Number of children: None    Years of education: None    Highest education level: None   Occupational History    None   Tobacco Use    Smoking status: Former Smoker     Types: Cigarettes     Quit date: 1990     Years since quittin 4    Smokeless tobacco: Never Used   Vaping Use    Vaping Use: Never used   Substance and Sexual Activity    Alcohol use:  Yes Alcohol/week: 5 0 standard drinks     Types: 5 Glasses of wine per week     Comment: occasionally / social     Drug use: No    Sexual activity: Yes     Partners: Male   Other Topics Concern    None   Social History Narrative    None     Social Determinants of Health     Financial Resource Strain: Not on file   Food Insecurity: Not on file   Transportation Needs: Not on file   Physical Activity: Not on file   Stress: Not on file   Social Connections: Not on file   Intimate Partner Violence: Not on file   Housing Stability: Not on file      Family History:     Family History   Problem Relation Age of Onset    Hyperlipidemia Mother     Cancer Father         of spine    Stroke Father         syndrome     No Known Problems Sister     Breast cancer Maternal Aunt 72    No Known Problems Maternal Aunt       Current Medications:     Current Outpatient Medications   Medication Sig Dispense Refill    Cholecalciferol (Vitamin D3) 50 MCG (2000 UT) capsule Take 1 capsule (2,000 Units total) by mouth daily  0    fluticasone (FLONASE) 50 mcg/act nasal spray 1 spray into each nostril      levothyroxine 75 mcg tablet Take 1 tablet (75 mcg total) by mouth daily 90 tablet 3    meloxicam (MOBIC) 15 mg tablet Take 1 tablet (15 mg total) by mouth daily 30 tablet 1    ibuprofen (MOTRIN) 200 mg tablet Take 200 mg by mouth every 6 (six) hours as needed       No current facility-administered medications for this visit  Allergies: Allergies   Allergen Reactions    Sulfa Antibiotics Rash      Physical Exam:     /78 (BP Location: Left arm, Patient Position: Sitting, Cuff Size: Standard)   Pulse 64   Resp 18   Ht 5' 2" (1 575 m)   Wt 63 kg (139 lb)   SpO2 99%   BMI 25 42 kg/m²     Physical Exam  Constitutional:       General: She is not in acute distress  Appearance: She is well-developed  She is not diaphoretic  HENT:      Head: Normocephalic        Right Ear: External ear normal       Left Ear: External ear normal       Nose: Nose normal    Eyes:      General: No scleral icterus  Right eye: No discharge  Left eye: No discharge  Conjunctiva/sclera: Conjunctivae normal       Pupils: Pupils are equal, round, and reactive to light  Neck:      Thyroid: No thyromegaly  Trachea: No tracheal deviation  Cardiovascular:      Rate and Rhythm: Normal rate and regular rhythm  Heart sounds: Normal heart sounds  No murmur heard  Pulmonary:      Effort: Pulmonary effort is normal  No respiratory distress  Breath sounds: Normal breath sounds  Abdominal:      General: Bowel sounds are normal  There is no distension  Palpations: Abdomen is soft  Tenderness: There is no abdominal tenderness  Musculoskeletal:         General: No tenderness or deformity  Normal range of motion  Lymphadenopathy:      Cervical: No cervical adenopathy  Skin:     General: Skin is warm  Coloration: Skin is not pale  Findings: No erythema or rash  Neurological:      Cranial Nerves: No cranial nerve deficit        Coordination: Coordination normal    Psychiatric:         Behavior: Behavior normal           MD Katarina Christy [id number]

## 2022-06-10 NOTE — ASSESSMENT & PLAN NOTE
She has had no significant dysphagia but does occasionally feel as though she has difficulty swallowing  She declines further follow-up  She did have a normal EGD in December of 2020

## 2022-06-10 NOTE — PATIENT INSTRUCTIONS

## 2022-06-10 NOTE — ASSESSMENT & PLAN NOTE
She has completed physical therapy for now  I did prescribe meloxicam to use as needed  She also be a good candidate for an injection and she will consider that as well

## 2022-06-10 NOTE — ASSESSMENT & PLAN NOTE
She was noted to have a potential uterine prolapse during her recent cystoscopy  Fortunately, she is having no further hematuria  I am going to have her see Gynecology

## 2022-06-28 ENCOUNTER — TELEPHONE (OUTPATIENT)
Dept: FAMILY MEDICINE CLINIC | Facility: CLINIC | Age: 67
End: 2022-06-28

## 2022-06-28 NOTE — TELEPHONE ENCOUNTER
Pt called in , she just tested positive and wants to know if she's able to get paxlovid    Urgent care will not give paxlovid and is telling patients to see PCP

## 2022-06-29 ENCOUNTER — TELEMEDICINE (OUTPATIENT)
Dept: FAMILY MEDICINE CLINIC | Facility: CLINIC | Age: 67
End: 2022-06-29
Payer: COMMERCIAL

## 2022-06-29 DIAGNOSIS — U07.1 COVID-19 VIRUS INFECTION: Primary | ICD-10-CM

## 2022-06-29 PROCEDURE — 1036F TOBACCO NON-USER: CPT | Performed by: FAMILY MEDICINE

## 2022-06-29 PROCEDURE — 99214 OFFICE O/P EST MOD 30 MIN: CPT | Performed by: FAMILY MEDICINE

## 2022-06-29 NOTE — PROGRESS NOTES
Virtual Brief Visit    Patient is located in the following state in which I hold an active license [place]      Assessment/Plan:    1  COVID-19 virus infection  Recommendation to increase fluids - particularly water, rest, saline nasal spray and humidified air    Pt is interested in starting paxlovid  Discussed that it is EUA  Discussed possible adr's  Pt not on any meds that interact adversely with paxlovid    Tylenol prn  Robitussin DM prn cough/postnasal drip  Discussed isolation with patient      Problem List Items Addressed This Visit    None         Recent Visits  Date Type Provider Dept   06/28/22 Telephone [facility]   Showing recent visits within past 7 days and meeting all other requirements  Today's Visits  Date Type Provider Dept   06/29/22 Telemedicine DO Bull [facility]  Primary Care   Showing today's visits and meeting all other requirements  Future Appointments  No visits were found meeting these conditions    Showing future appointments within next 150 days and meeting all other requirements     Pt presents today for acute visit for covid infection  Tested (+) at home yesterday  Symptoms began yesterday am  To include -   Nasal congestion  Coughing  Possible fever yesterday - but did not check temp  Taking tylenol  Sore throat  Pt had covid vaccines and 2 boosters        I spent 20 minutes directly with the patient during this visit

## 2022-08-10 DIAGNOSIS — M75.81 TENDINITIS OF RIGHT ROTATOR CUFF: ICD-10-CM

## 2022-08-11 RX ORDER — MELOXICAM 15 MG/1
15 TABLET ORAL DAILY
Qty: 30 TABLET | Refills: 1 | Status: SHIPPED | OUTPATIENT
Start: 2022-08-11 | End: 2022-10-17

## 2022-10-17 DIAGNOSIS — M75.81 TENDINITIS OF RIGHT ROTATOR CUFF: ICD-10-CM

## 2022-10-17 RX ORDER — MELOXICAM 15 MG/1
15 TABLET ORAL DAILY
Qty: 30 TABLET | Refills: 1 | Status: SHIPPED | OUTPATIENT
Start: 2022-10-17

## 2022-11-10 ENCOUNTER — IMMUNIZATIONS (OUTPATIENT)
Dept: FAMILY MEDICINE CLINIC | Facility: CLINIC | Age: 67
End: 2022-11-10

## 2022-11-10 DIAGNOSIS — Z23 NEED FOR INFLUENZA VACCINATION: Primary | ICD-10-CM

## 2022-12-05 ENCOUNTER — RA CDI HCC (OUTPATIENT)
Dept: OTHER | Facility: HOSPITAL | Age: 67
End: 2022-12-05

## 2022-12-05 NOTE — PROGRESS NOTES
[facility]  coding opportunities       Chart reviewed, no opportunity found: CHART REVIEWED, NO OPPORTUNITY FOUND        Patients Insurance        Commercial Insurance: [address] [place] [address]

## 2023-01-17 ENCOUNTER — OFFICE VISIT (OUTPATIENT)
Dept: FAMILY MEDICINE CLINIC | Facility: CLINIC | Age: 68
End: 2023-01-17

## 2023-01-17 VITALS
OXYGEN SATURATION: 98 % | HEIGHT: 62 IN | DIASTOLIC BLOOD PRESSURE: 70 MMHG | WEIGHT: 134.2 LBS | HEART RATE: 71 BPM | BODY MASS INDEX: 24.69 KG/M2 | TEMPERATURE: 98.7 F | SYSTOLIC BLOOD PRESSURE: 120 MMHG

## 2023-01-17 DIAGNOSIS — H61.21 EXCESSIVE CERUMEN IN EAR CANAL, RIGHT: ICD-10-CM

## 2023-01-17 DIAGNOSIS — J01.00 ACUTE NON-RECURRENT MAXILLARY SINUSITIS: Primary | ICD-10-CM

## 2023-01-17 RX ORDER — AMOXICILLIN AND CLAVULANATE POTASSIUM 875; 125 MG/1; MG/1
1 TABLET, FILM COATED ORAL EVERY 12 HOURS SCHEDULED
Qty: 14 TABLET | Refills: 0 | Status: SHIPPED | OUTPATIENT
Start: 2023-01-17 | End: 2023-01-24

## 2023-01-17 NOTE — PROGRESS NOTES
Assessment/Plan:       Problem List Items Addressed This Visit    None  Visit Diagnoses     Acute non-recurrent maxillary sinusitis    -  Primary    Relevant Medications    amoxicillin-clavulanate (Augmentin) 875-125 mg per tablet    Excessive cerumen in ear canal, right              Start abx due to no improvement with OTC medication for greater than 10 days, follow up if not improving  Start debrox for cerumen, return if not improving  Subjective:      Patient ID: Theresa Zee is a 74 y o  female  HPI     74year old female presenting for 10 days of congestion and sinus pressure  She has tried saline and decongestant, symptoms have remained about hte same  Right ear clogged  The following portions of the patient's history were reviewed and updated as appropriate: allergies, current medications, past family history, past medical history, past social history, past surgical history and problem list       Current Outpatient Medications:   •  amoxicillin-clavulanate (Augmentin) 875-125 mg per tablet, Take 1 tablet by mouth every 12 (twelve) hours for 7 days, Disp: 14 tablet, Rfl: 0  •  Cholecalciferol (Vitamin D3) 50 MCG (2000 UT) capsule, Take 1 capsule (2,000 Units total) by mouth daily, Disp: , Rfl: 0  •  fluticasone (FLONASE) 50 mcg/act nasal spray, 1 spray into each nostril, Disp: , Rfl:   •  ibuprofen (MOTRIN) 200 mg tablet, Take 200 mg by mouth every 6 (six) hours as needed, Disp: , Rfl:   •  levothyroxine 75 mcg tablet, Take 1 tablet (75 mcg total) by mouth daily, Disp: 90 tablet, Rfl: 3  •  meloxicam (MOBIC) 15 mg tablet, TAKE 1 TABLET (15 MG TOTAL) BY MOUTH DAILY  , Disp: 30 tablet, Rfl: 1     Review of Systems   Constitutional: Negative for activity change and appetite change  Respiratory: Negative for cough, chest tightness and shortness of breath  Cardiovascular: Negative for chest pain and palpitations  Gastrointestinal: Negative for abdominal distention and abdominal pain  Musculoskeletal: Negative for arthralgias and back pain  Objective:      /70 (BP Location: Left arm, Cuff Size: Standard)   Pulse 71   Temp 98 7 °F (37 1 °C) (Tympanic)   Ht 5' 2" (1 575 m)   Wt 60 9 kg (134 lb 3 2 oz)   SpO2 98%   BMI 24 55 kg/m²          Physical Exam  HENT:      Right Ear: There is impacted cerumen  Nose: Congestion present  Right Turbinates: Not enlarged  Left Turbinates: Not enlarged  Right Sinus: No maxillary sinus tenderness or frontal sinus tenderness  Left Sinus: No maxillary sinus tenderness or frontal sinus tenderness  Cardiovascular:      Rate and Rhythm: Normal rate and regular rhythm  Pulses: Normal pulses  Pulmonary:      Effort: Pulmonary effort is normal    Neurological:      Mental Status: She is alert             Brook Campos MD

## 2023-01-23 ENCOUNTER — TELEMEDICINE (OUTPATIENT)
Dept: FAMILY MEDICINE CLINIC | Facility: CLINIC | Age: 68
End: 2023-01-23

## 2023-01-23 DIAGNOSIS — U07.1 COVID-19: Primary | ICD-10-CM

## 2023-01-23 RX ORDER — NIRMATRELVIR AND RITONAVIR 300-100 MG
3 KIT ORAL 2 TIMES DAILY
Qty: 30 TABLET | Refills: 0 | Status: SHIPPED | OUTPATIENT
Start: 2023-01-23 | End: 2023-01-28

## 2023-01-23 NOTE — PROGRESS NOTES
COVID-19 Outpatient Progress Note    Assessment/Plan:    Problem List Items Addressed This Visit    None  Visit Diagnoses     COVID-19    -  Primary    Relevant Medications    nirmatrelvir & ritonavir (Paxlovid, 300/100,) tablet therapy pack       1  COVID-19  Start paxlovid, follow up if worsening, will go the ER for chest pain and/or shortness of breath  - nirmatrelvir & ritonavir (Paxlovid, 300/100,) tablet therapy pack; Take 3 tablets by mouth 2 (two) times a day for 5 days Take 2 nirmatrelvir tablets + 1 ritonavir tablet together per dose  Dispense: 30 tablet; Refill: 0    Disposition:     Recommended patient to come to the office to test for COVID-19  Patient meets criteria for PAXLOVID and they have been counseled appropriately according to EUA documentation released by the FDA  After discussion, patient agrees to treatment  [address] is an investigational medicine used to treat mild-to-moderate COVID-19 in adults and children (15years of age and older weighing at least 80 pounds (40 kg)) with positive results of direct SARS-CoV-2 viral testing, and who are at high risk for progression to severe COVID-19, including hospitalization or death  PAXLOVID is investigational because it is still being studied  There is limited information about the safety and effectiveness of using PAXLOVID to treat people with mild-to-moderate COVID-19  The FDA has authorized the emergency use of PAXLOVID for the treatment of mild-tomoderate COVID-19 in adults and children (15years of age and older weighing at least 80 pounds (40 kg)) with a positive test for the virus that causes COVID-19, and who are at high risk for progression to severe COVID-19, including hospitalization or death, under an EUA  What should I tell my healthcare provider before I take PAXLOVID?     Tell your healthcare provider if you:  - Have any allergies  - Have liver or kidney disease  - Are pregnant or plan to become pregnant  - Are breastfeeding a child  - Have any serious illnesses    Tell your healthcare provider about all the medicines you take, including prescription and over-the-counter medicines, vitamins, and herbal supplements  Some medicines may interact with PAXLOVID and may cause serious side effects  Keep a list of your medicines to show your healthcare provider and pharmacist when you get a new medicine  You can ask your healthcare provider or pharmacist for a list of medicines that interact with PAXLOVID  Do not start taking a new medicine without telling your healthcare provider  Your healthcare provider can tell you if it is safe to take PAXLOVID with other medicines  Tell your healthcare provider if you are taking combined hormonal contraceptive  PAXLOVID may affect how your birth control pills work  Females who are able to become pregnant should use another effective alternative form of contraception or an additional barrier method of contraception  Talk to your healthcare provider if you have any questions about contraceptive methods that might be right for you  How do I take PAXLOVID? PAXLOVID consists of 2 medicines: nirmatrelvir and ritonavir  - Take 2 pink tablets of nirmatrelvir with 1 white tablet of ritonavir by mouth 2 times each day (in the morning and in the evening) for 5 days  For each dose, take all 3 tablets at the same time  - If you have kidney disease, talk to your healthcare provider  You may need a different dose  - Swallow the tablets whole  Do not chew, break, or crush the tablets  - Take PAXLOVID with or without food  - Do not stop taking PAXLOVID without talking to your healthcare provider, even if you feel better  - If you miss a dose of PAXLOVID within 8 hours of the time it is usually taken, take it as soon as you remember  If you miss a dose by more than 8 hours, skip the missed dose and take the next dose at your regular time  Do not take 2 doses of PAXLOVID at the same time    - If you take too much PAXLOVID, call your healthcare provider or go to the nearest hospital emergency room right away  - If you are taking a ritonavir- or cobicistat-containing medicine to treat hepatitis C or Human Immunodeficiency Virus (HIV), you should continue to take your medicine as prescribed by your healthcare provider   - Talk to your healthcare provider if you do not feel better or if you feel worse after 5 days  Who should generally not take PAXLOVID? Do not take PAXLOVID if:  You are allergic to nirmatrelvir, ritonavir, or any of the ingredients in PAXLOVID  You are taking any of the following medicines:  - Alfuzosin  - Pethidine, piroxicam, propoxyphene  - Ranolazine  - Amiodarone, dronedarone, flecainide, propafenone, quinidine  - Colchicine  - Lurasidone, pimozide, clozapine  - Dihydroergotamine, ergotamine, methylergonovine  - Lovastatin, simvastatin  - Sildenafil (Revatio®) for pulmonary arterial hypertension (PAH)  - Triazolam, oral midazolam  - Apalutamide  - Carbamazepine, phenobarbital, phenytoin  - Rifampin  - St  José Miguel’s Wort (hypericum perforatum)    What are the important possible side effects of PAXLOVID? Possible side effects of PAXLOVID are:  - Liver Problems  Tell your healthcare provider right away if you have any of these signs and symptoms of liver problems: loss of appetite, yellowing of your skin and the whites of eyes (jaundice), dark-colored urine, pale colored stools and itchy skin, stomach area (abdominal) pain  - Resistance to HIV Medicines  If you have untreated HIV infection, PAXLOVID may lead to some HIV medicines not working as well in the future  - Other possible side effects include: altered sense of taste, diarrhea, high blood pressure, or muscle aches    These are not all the possible side effects of PAXLOVID  Not many people have taken PAXLOVID  Serious and unexpected side effects may happen   Abram Jain is still being studied, so it is possible that all of the risks are not known at this time  What other treatment choices are there? Like Alejandro Hdz may allow for the emergency use of other medicines to treat people with COVID-19  Go to https://[employer]/ for information on the emergency use of other medicines that are authorized by FDA to treat people with COVID-19  Your healthcare provider may talk with you about clinical trials for which you may be eligible  It is your choice to be treated or not to be treated with PAXLOVID  Should you decide not to receive it or for your child not to receive it, it will not change your standard medical care  What if I am pregnant or breastfeeding? There is no experience treating pregnant women or breastfeeding mothers with PAXLOVID  For a mother and unborn baby, the benefit of taking PAXLOVID may be greater than the risk from the treatment  If you are pregnant, discuss your options and specific situation with your healthcare provider  It is recommended that you use effective barrier contraception or do not have sexual activity while taking PAXLOVID  If you are breastfeeding, discuss your options and specific situation with your healthcare provider  How do I report side effects with PAXLOVID? Contact your healthcare provider if you have any side effects that bother you or do not go away  Report side effects to FDA MedWatch at www fda gov/medwatch or call 6-803-NRP8798 or you can report side effects to [facility]  at the contact information provided below  Website Fax number Telephone number   [employer] [phone]     How should I store Steve Daner? Store PAXLOVID tablets at room temperature between 68°F to 77°F (20°C to 25°C)      Full fact sheet for patients, parents, and caregivers can be found at: [employer]    I have spent 8 minutes directly with the patient  Greater than 50% of this time was spent in counseling/coordination of care regarding: diagnostic results  Encounter provider: Willi Cook MD     Provider located at: [place] [address] [facility]  [address]  [facility] [address] [phone]     Recent Visits  Date Type Provider Dept   01/17/23 Office Visit Ye [facility]   Showing recent visits within past 7 days and meeting all other requirements  Today's Visits  Date Type Provider Dept   01/23/23 Telemedicine Willi Cook MD [facility]   Showing today's visits and meeting all other requirements  Future Appointments  No visits were found meeting these conditions  Showing future appointments within next 150 days and meeting all other requirements     This virtual check-in was done via [employer] [address] and patient was informed that this is a secure, HIPAA-compliant platform  She agrees to proceed  Patient agrees to participate in a virtual check in via telephone or video visit instead of presenting to the office to address urgent/immediate medical needs  Patient is aware this is a billable service  She acknowledged consent and understanding of privacy and security of the video platform  The patient has agreed to participate and understands they can discontinue the visit at any time  After connecting through [facility], the patient was identified by name and date of birth  Phani Dhillon was informed that this was a telemedicine visit and that the exam was being conducted confidentially over secure lines  Phani Dhillon acknowledged consent and understanding of privacy and security of the telemedicine visit  I informed the patient that I have reviewed her record in Epic and presented the opportunity for her to ask any questions regarding the visit today   The patient agreed to participate  Verification of patient location:  Patient is located in the following state in which I hold an active license: [place]    Subjective:   Dontae Branch is a 74 y o  female who is concerned about COVID-19  Patient's symptoms include nasal congestion, sore throat and cough  Patient denies fever, chills, shortness of breath, nausea and diarrhea  - Date of symptom onset: 1/22/2023      COVID-19 vaccination status: Fully vaccinated with booster    Patient seen one week and treated for sinus infection, symptoms were resolving until yesterday when patient began worsening again, with sore throat and congestion, after this took a COVID test which was positive,    Lab Results   Component Value Date    SARSCORONAVI Not Detected 01/10/2023       Review of Systems   Constitutional: Negative for chills and fever  HENT: Positive for congestion and sore throat  Respiratory: Positive for cough  Negative for shortness of breath  Gastrointestinal: Negative for diarrhea and nausea  Genitourinary: Negative for difficulty urinating and dyspareunia  Psychiatric/Behavioral: Negative for agitation and behavioral problems  Current Outpatient Medications on File Prior to Visit   Medication Sig   • amoxicillin-clavulanate (Augmentin) 875-125 mg per tablet Take 1 tablet by mouth every 12 (twelve) hours for 7 days   • Cholecalciferol (Vitamin D3) 50 MCG (2000 UT) capsule Take 1 capsule (2,000 Units total) by mouth daily   • fluticasone (FLONASE) 50 mcg/act nasal spray 1 spray into each nostril   • ibuprofen (MOTRIN) 200 mg tablet Take 200 mg by mouth every 6 (six) hours as needed   • levothyroxine 75 mcg tablet Take 1 tablet (75 mcg total) by mouth daily   • meloxicam (MOBIC) 15 mg tablet TAKE 1 TABLET (15 MG TOTAL) BY MOUTH DAILY  Objective: There were no vitals taken for this visit  Physical Exam  Constitutional:       Appearance: Normal appearance        Comments: Normal appearance on video, no acute distress  Neurological:      Mental Status: She is alert         Sumeet Liu MD

## 2023-02-02 ENCOUNTER — HOSPITAL ENCOUNTER (OUTPATIENT)
Dept: BONE DENSITY | Facility: MEDICAL CENTER | Age: 68
Discharge: HOME | End: 2023-02-02

## 2023-02-02 DIAGNOSIS — Z78.0 POST-MENOPAUSAL: ICD-10-CM

## 2023-02-17 ENCOUNTER — OFFICE VISIT (OUTPATIENT)
Dept: OBGYN CLINIC | Facility: MEDICAL CENTER | Age: 68
End: 2023-02-17

## 2023-02-17 VITALS
WEIGHT: 130 LBS | SYSTOLIC BLOOD PRESSURE: 100 MMHG | HEIGHT: 62 IN | DIASTOLIC BLOOD PRESSURE: 70 MMHG | BODY MASS INDEX: 23.92 KG/M2

## 2023-02-17 DIAGNOSIS — N81.10 CYSTOCELE WITH RECTOCELE: ICD-10-CM

## 2023-02-17 DIAGNOSIS — Z01.419 ENCOUNTER FOR WELL WOMAN EXAM WITH ROUTINE GYNECOLOGICAL EXAM: Primary | ICD-10-CM

## 2023-02-17 DIAGNOSIS — N95.0 POSTMENOPAUSAL BLEEDING: ICD-10-CM

## 2023-02-17 DIAGNOSIS — Z12.31 ENCOUNTER FOR SCREENING MAMMOGRAM FOR MALIGNANT NEOPLASM OF BREAST: ICD-10-CM

## 2023-02-17 DIAGNOSIS — N81.6 CYSTOCELE WITH RECTOCELE: ICD-10-CM

## 2023-02-17 NOTE — PROGRESS NOTES
[facility], [address] [place], [place]    ASSESSMENT/PLAN: Kenneth Yarbrough is a 74 y o  [id number] who presents for annual gynecologic exam     Encounter for routine gynecologic examination  - Routine well woman exam completed today  - Cervical Cancer Screening pap done today  - Breast Cancer Screening: Last Mammogram 01/13/2022, mammo ordered  - Colorectal cancer screening was not ordered  - The following were reviewed in today's visit: breast self exam and mammography screening ordered    Additional problems addressed at this visit:  1  Encounter for well woman exam with routine gynecological exam  -     Liquid-based pap, screening    2  Encounter for screening mammogram for malignant neoplasm of breast  -     Mammo screening bilateral w 3d & cad; Future    3  Postmenopausal bleeding  -     Tissue Exam    4  Cystocele with rectocele  -     Ambulatory Referral to Urogynecology; Future  -     Ambulatory Referral to Physical Therapy; Future          CC:  Annual Gynecologic Examination    HPI: Kenneth Yarbrough is a 74 y o  [id number] who presents for annual gynecologic examination  HPI  Patient presents for annual exam   She reports multiple complaints including postmenopausal bleeding, increased vaginal pressure and concern for rectocele and cystocele  Patient does report a history of postmenopausal bleeding and was subsequently diagnosed with endometrial polyps  She had a D&C in 2021 which included polypectomy and had benign findings  She states she started experiencing dark blood discharge a few weeks ago  She also reports increased vaginal pressure  She denies loss of urine, urinary urgency or hesitancy, increased frequency  She does report constipation but has been changing her diet with improvement      The following portions of the patient's history were reviewed and updated as appropriate: allergies, current medications, past family history, past medical history, obstetric history, gynecologic history, past social history, past surgical history and problem list     Review of Systems   Constitutional: Negative  HENT: Negative  Eyes: Negative  Respiratory: Negative  Cardiovascular: Negative  Gastrointestinal: Negative  Genitourinary: Negative  Negative for difficulty urinating and dysuria  Vaginal pain:  Increased pressure  Musculoskeletal: Negative  All other systems reviewed and are negative  Objective:  /70 (BP Location: Right arm)   Ht 5' 2" (1 575 m)   Wt 59 kg (130 lb)   BMI 23 78 kg/m²    Physical Exam  Vitals reviewed  Constitutional:       General: She is not in acute distress  Appearance: She is well-developed  HENT:      Head: Normocephalic and atraumatic  Nose: Nose normal    Cardiovascular:      Rate and Rhythm: Normal rate  Pulmonary:      Effort: Pulmonary effort is normal  No respiratory distress  Chest:   Breasts:     Breasts are symmetrical       Right: Normal  No mass, nipple discharge, skin change or tenderness  Left: Normal  No mass, nipple discharge, skin change or tenderness  Abdominal:      General: There is no distension  Palpations: Abdomen is soft  There is no mass  Tenderness: There is no abdominal tenderness  There is no guarding or rebound  Genitourinary:     General: Normal vulva  Exam position: Lithotomy position  Labia:         Right: No lesion  Left: No lesion  Urethra: No prolapse (urethral meatus normal)  Vagina: Prolapsed vaginal walls ( Cystocele and rectocele noted) present  No vaginal discharge, erythema or bleeding  Cervix: Normal       Uterus: Normal        Adnexa: Right adnexa normal and left adnexa normal    Musculoskeletal:         General: Normal range of motion  Cervical back: Normal range of motion  Lymphadenopathy:      Upper Body:      Right upper body: No supraclavicular, axillary or pectoral adenopathy        Left upper body: No supraclavicular, axillary or pectoral adenopathy  Lower Body: No right inguinal adenopathy  No left inguinal adenopathy  Skin:     General: Skin is warm and dry  Neurological:      Mental Status: She is alert and oriented to person, place, and time  Psychiatric:         Behavior: Behavior normal          Thought Content:  Thought content normal          Judgment: Judgment normal              Kevin Cason MD  [facility]  02/17/23 3:28 PM

## 2023-02-17 NOTE — PROGRESS NOTES
Endometrial biopsy    Date/Time: 2/17/2023 2:30 AM  Performed by: Dori Ulrich MD  Authorized by: Dori Ulrich MD   Universal Protocol:  Consent: Verbal consent obtained    Risks and benefits: risks, benefits and alternatives were discussed  Consent given by: patient  Patient understanding: patient states understanding of the procedure being performed  Patient identity confirmed: verbally with patient      Indication:     Indications: Post-menopausal bleeding      Chronicity of post-menopausal bleeding:  Recurrent    Progression of post-menopausal bleeding:  Waxing and waning  Procedure:     Procedure: endometrial biopsy with Pipelle      A bivalve speculum was placed in the vagina: yes      Cervix cleaned and prepped: yes      The cervix was dilated: no      Uterus sounded: yes      Uterus sound depth (cm):  7    Specimen collected: specimen collected and sent to pathology      Patient tolerated procedure well with no complications: yes

## 2023-02-24 LAB
HPV HR 12 DNA CVX QL NAA+PROBE: NEGATIVE
HPV16 DNA CVX QL NAA+PROBE: NEGATIVE
HPV18 DNA CVX QL NAA+PROBE: NEGATIVE

## 2023-02-28 ENCOUNTER — TELEPHONE (OUTPATIENT)
Dept: OBGYN CLINIC | Facility: MEDICAL CENTER | Age: 68
End: 2023-02-28

## 2023-02-28 NOTE — TELEPHONE ENCOUNTER
Pt called regarding her results and follow up care,pt made aware of result & pt made a virtual appt to diiscus her options with her provider in detail

## 2023-03-01 LAB
LAB AP GYN PRIMARY INTERPRETATION: NORMAL
Lab: NORMAL

## 2023-04-05 ENCOUNTER — TELEPHONE (OUTPATIENT)
Dept: OBGYN CLINIC | Facility: MEDICAL CENTER | Age: 68
End: 2023-04-05

## 2023-04-05 ENCOUNTER — TELEMEDICINE (OUTPATIENT)
Dept: OBGYN CLINIC | Facility: MEDICAL CENTER | Age: 68
End: 2023-04-05

## 2023-04-05 DIAGNOSIS — N95.0 POSTMENOPAUSAL BLEEDING: ICD-10-CM

## 2023-04-05 DIAGNOSIS — N84.0 ENDOMETRIAL POLYP: Primary | ICD-10-CM

## 2023-04-05 NOTE — TELEPHONE ENCOUNTER
----- Message from Sharlene Orona MD sent at 4/5/2023  1:56 PM EDT -----  Can you please schedule for diagnostic hysteroscopy, D&C, polypectomy? Patient is looking at the fall

## 2023-04-05 NOTE — PROGRESS NOTES
Virtual Regular Visit    Verification of patient location:    Patient is located in the following state in which I hold an active license [place]      Assessment/Plan:    Problem List Items Addressed This Visit    None  Visit Diagnoses     Endometrial polyp    -  Primary    Postmenopausal bleeding                   Reason for visit is   Chief Complaint   Patient presents with   • Results   • Virtual Regular Visit        Encounter provider Jimmie Mcneill MD    Provider located at [address]  [employer] [facility] [address]  [facility] [address]      Recent Visits  No visits were found meeting these conditions  Showing recent visits within past 7 days and meeting all other requirements  Today's Visits  Date Type Provider Dept   04/05/23 Telemedicine Jimmie Mcneill MD [facility] today's visits and meeting all other requirements  Future Appointments  No visits were found meeting these conditions  Showing future appointments within next 150 days and meeting all other requirements       The patient was identified by name and date of birth  Mi Salazar was informed that this is a telemedicine visit and that the visit is being conducted through the [address] [employer] platform  She agrees to proceed     My office door was closed  No one else was in the room  She acknowledged consent and understanding of privacy and security of the video platform  The patient has agreed to participate and understands they can discontinue the visit at any time  Patient is aware this is a billable service  Subjective  Mi Salazar is a 74 y o  female for discussion of recent EMB  Patient had an endometrial biopsy for postmenopausal bleeding  The results were consistent with fragments of endometrial polyp, no evidence of hyperplasia or EIN  Patient had same procedure in 2020  All questions answered  Will proceed with procedure     Will schedule follow up with Urogyn regarding rectocele/prolapse  HPI     Past Medical History:   Diagnosis Date   • Anxiety     last assessed 12/1/14, resolved 2/1/16   • Closed displaced fracture of proximal phalanx of lesser toe of right foot 9/11/2020   • Disease of thyroid gland    • Headache syndrome 5/12/2017   • Hematuria     last assessed 4/23/15, resolved 2/1/16   • Left breast mass 12/18/2019     Questionable mass on exam 2019 May be patch of more cystic breast tissue, not well defined  Will check dx mammo  Mammogram was normal 9/20   • PONV (postoperative nausea and vomiting)    • Skin neoplasm     last assessed 9/18/15, resolved 2/1/16       Past Surgical History:   Procedure Laterality Date   • COLONOSCOPY     • HYSTEROSCOPY WITH  RESECTION UTERINE TUMOR/FIBROID  03/2020   • SHOULDER SURGERY Left 2009    tear repair   • TOOTH EXTRACTION         Current Outpatient Medications   Medication Sig Dispense Refill   • Cholecalciferol (Vitamin D3) 50 MCG (2000 UT) capsule Take 1 capsule (2,000 Units total) by mouth daily  0   • ibuprofen (MOTRIN) 200 mg tablet Take 200 mg by mouth every 6 (six) hours as needed     • levothyroxine 75 mcg tablet Take 1 tablet (75 mcg total) by mouth daily 90 tablet 3   • meloxicam (MOBIC) 15 mg tablet TAKE 1 TABLET (15 MG TOTAL) BY MOUTH DAILY  30 tablet 1     No current facility-administered medications for this visit  Allergies   Allergen Reactions   • Sulfa Antibiotics Rash       Review of Systems   Constitutional: Negative  HENT: Negative  Eyes: Negative  Respiratory: Negative  Cardiovascular: Negative  Gastrointestinal: Negative  Genitourinary: Positive for menstrual problem (pmb)  Musculoskeletal: Negative  All other systems reviewed and are negative  Video Exam    There were no vitals filed for this visit  Physical Exam   General: Patient appears well-developed  Patient is adequately nourished  Patient is not diaphoretic   Patient is not in distress  Neck: Visualization of the neck demonstrates no grossly visible masses  Neck mobility is not compromised, neck appears supple  HEENT: Oral mucosa appears moist  Patient does not identify palpable neck masses  Patient reports no oral tenderness or readily identifiable masses  Eyes: Conjunctivae appear normal bilaterally  Right eye with no discharge  Left eye with no discharge  No evidence of scleral icterus  No evidence of strabismus  Respiratory: Respiratory effort appears normal  There is no respiratory distress  Patient able to speak in full sentences  There was no audible stridor or cough  Abdomen: Patient states her abdomen is soft  States abdomen is non-tender  States abdomen is non-distended  Patient denies visible or palpable bulges to suggest hernias  Musculoskeletal: Patient reports and I can confirm no visible deformities in 4 extremities  Patient reports and I can confirm full mobility in 4 extremities  There is no grossly visible limb edema  There is no evidence of clubbing or peripheral cyanosis  Neurologic: Patient is fully alert and responsive  Patient is oriented to time, place and person  Gross evaluation of CNs III-IV-VI-VII-VIII and XI demonstrates no deficits  Patient reports normal gait and balance  Skin: My evaluation of exposed skin areas reveals no evidence of pallor  My evaluation of exposed skin areas reveals no obvious rashes  My evaluation of exposed skin areas reveals no grossly visible lesions  My evaluation of exposed skin areas reveals no evidence of erythema  Psychiatric / Behavioral: Patient's mood and affect appears normal  Patient's judgement is preserved  Patient is coherent and thought content appears directionally and contextually appropriate for age and health status      I spent 15 minutes directly with the patient during this visit

## 2023-04-27 ENCOUNTER — TELEPHONE (OUTPATIENT)
Dept: OBGYN CLINIC | Facility: MEDICAL CENTER | Age: 68
End: 2023-04-27

## 2023-04-27 ENCOUNTER — APPOINTMENT (OUTPATIENT)
Dept: LAB | Facility: MEDICAL CENTER | Age: 68
End: 2023-04-27

## 2023-04-27 DIAGNOSIS — R73.9 HYPERGLYCEMIA: ICD-10-CM

## 2023-04-27 DIAGNOSIS — R31.0 GROSS HEMATURIA: ICD-10-CM

## 2023-04-27 DIAGNOSIS — E03.9 ACQUIRED HYPOTHYROIDISM: ICD-10-CM

## 2023-04-27 DIAGNOSIS — E55.9 VITAMIN D DEFICIENCY: ICD-10-CM

## 2023-04-27 DIAGNOSIS — E78.00 HYPERCHOLESTEROLEMIA: ICD-10-CM

## 2023-04-27 LAB
25(OH)D3 SERPL-MCNC: 22.9 NG/ML (ref 30–100)
ALBUMIN SERPL BCP-MCNC: 4 G/DL (ref 3.5–5)
ALP SERPL-CCNC: 62 U/L (ref 46–116)
ALT SERPL W P-5'-P-CCNC: 31 U/L (ref 12–78)
ANION GAP SERPL CALCULATED.3IONS-SCNC: 3 MMOL/L (ref 4–13)
AST SERPL W P-5'-P-CCNC: 19 U/L (ref 5–45)
BASOPHILS # BLD AUTO: 0.04 THOUSANDS/ΜL (ref 0–0.1)
BASOPHILS NFR BLD AUTO: 1 % (ref 0–1)
BILIRUB SERPL-MCNC: 0.6 MG/DL (ref 0.2–1)
BUN SERPL-MCNC: 16 MG/DL (ref 5–25)
CALCIUM SERPL-MCNC: 9.2 MG/DL (ref 8.3–10.1)
CHLORIDE SERPL-SCNC: 107 MMOL/L (ref 96–108)
CO2 SERPL-SCNC: 27 MMOL/L (ref 21–32)
CREAT SERPL-MCNC: 0.58 MG/DL (ref 0.6–1.3)
EOSINOPHIL # BLD AUTO: 0.06 THOUSAND/ΜL (ref 0–0.61)
EOSINOPHIL NFR BLD AUTO: 1 % (ref 0–6)
ERYTHROCYTE [DISTWIDTH] IN BLOOD BY AUTOMATED COUNT: 12.7 % (ref 11.6–15.1)
GFR SERPL CREATININE-BSD FRML MDRD: 97 ML/MIN/1.73SQ M
GLUCOSE SERPL-MCNC: 103 MG/DL (ref 65–140)
HCT VFR BLD AUTO: 41.2 % (ref 34.8–46.1)
HGB BLD-MCNC: 13 G/DL (ref 11.5–15.4)
IMM GRANULOCYTES # BLD AUTO: 0.01 THOUSAND/UL (ref 0–0.2)
IMM GRANULOCYTES NFR BLD AUTO: 0 % (ref 0–2)
LYMPHOCYTES # BLD AUTO: 2.2 THOUSANDS/ΜL (ref 0.6–4.47)
LYMPHOCYTES NFR BLD AUTO: 47 % (ref 14–44)
MCH RBC QN AUTO: 31.2 PG (ref 26.8–34.3)
MCHC RBC AUTO-ENTMCNC: 31.6 G/DL (ref 31.4–37.4)
MCV RBC AUTO: 99 FL (ref 82–98)
MONOCYTES # BLD AUTO: 0.35 THOUSAND/ΜL (ref 0.17–1.22)
MONOCYTES NFR BLD AUTO: 8 % (ref 4–12)
NEUTROPHILS # BLD AUTO: 2.03 THOUSANDS/ΜL (ref 1.85–7.62)
NEUTS SEG NFR BLD AUTO: 43 % (ref 43–75)
NRBC BLD AUTO-RTO: 0 /100 WBCS
PLATELET # BLD AUTO: 297 THOUSANDS/UL (ref 149–390)
PMV BLD AUTO: 10.5 FL (ref 8.9–12.7)
POTASSIUM SERPL-SCNC: 4.1 MMOL/L (ref 3.5–5.3)
PROT SERPL-MCNC: 7.2 G/DL (ref 6.4–8.4)
RBC # BLD AUTO: 4.17 MILLION/UL (ref 3.81–5.12)
SODIUM SERPL-SCNC: 137 MMOL/L (ref 135–147)
TSH SERPL DL<=0.05 MIU/L-ACNC: 0.86 UIU/ML (ref 0.45–4.5)
WBC # BLD AUTO: 4.69 THOUSAND/UL (ref 4.31–10.16)

## 2023-04-27 NOTE — TELEPHONE ENCOUNTER
----- Message from Nemo Hernandez MD sent at 4/5/2023  1:56 PM EDT -----  Can you please schedule for diagnostic hysteroscopy, D&C, polypectomy? Patient is looking at the fall

## 2023-04-30 LAB
EST. AVERAGE GLUCOSE BLD GHB EST-MCNC: 105 MG/DL
HBA1C MFR BLD: 5.3 %

## 2023-05-02 ENCOUNTER — TELEPHONE (OUTPATIENT)
Dept: OBGYN CLINIC | Facility: MEDICAL CENTER | Age: 68
End: 2023-05-02

## 2023-05-03 ENCOUNTER — RA CDI HCC (OUTPATIENT)
Dept: OTHER | Facility: HOSPITAL | Age: 68
End: 2023-05-03

## 2023-05-03 NOTE — PROGRESS NOTES
[facility]  coding opportunities       Chart reviewed, no opportunity found: CHART REVIEWED, NO OPPORTUNITY FOUND     This is a reminder to address ALL HCC (risk adjustment) codes as found on active problem list for 2023 as patient scores reset to zero FLORA      Patients Insurance     Medicare Insurance: [employer]

## 2023-05-10 ENCOUNTER — APPOINTMENT (OUTPATIENT)
Dept: RADIOLOGY | Facility: MEDICAL CENTER | Age: 68
End: 2023-05-10

## 2023-05-10 ENCOUNTER — TELEPHONE (OUTPATIENT)
Dept: OBGYN CLINIC | Facility: OTHER | Age: 68
End: 2023-05-10

## 2023-05-10 ENCOUNTER — OFFICE VISIT (OUTPATIENT)
Dept: FAMILY MEDICINE CLINIC | Facility: CLINIC | Age: 68
End: 2023-05-10

## 2023-05-10 VITALS
HEIGHT: 62 IN | TEMPERATURE: 97.7 F | DIASTOLIC BLOOD PRESSURE: 70 MMHG | OXYGEN SATURATION: 97 % | BODY MASS INDEX: 23.74 KG/M2 | WEIGHT: 129 LBS | SYSTOLIC BLOOD PRESSURE: 112 MMHG | HEART RATE: 65 BPM

## 2023-05-10 DIAGNOSIS — J06.9 VIRAL UPPER RESPIRATORY TRACT INFECTION: ICD-10-CM

## 2023-05-10 DIAGNOSIS — M25.531 CHRONIC PAIN OF RIGHT WRIST: ICD-10-CM

## 2023-05-10 DIAGNOSIS — M62.89 PELVIC FLOOR DYSFUNCTION: ICD-10-CM

## 2023-05-10 DIAGNOSIS — E03.9 ACQUIRED HYPOTHYROIDISM: ICD-10-CM

## 2023-05-10 DIAGNOSIS — M75.81 TENDINITIS OF RIGHT ROTATOR CUFF: ICD-10-CM

## 2023-05-10 DIAGNOSIS — G89.29 CHRONIC PAIN OF RIGHT WRIST: ICD-10-CM

## 2023-05-10 DIAGNOSIS — H61.21 IMPACTED CERUMEN OF RIGHT EAR: ICD-10-CM

## 2023-05-10 DIAGNOSIS — E55.9 VITAMIN D DEFICIENCY: ICD-10-CM

## 2023-05-10 DIAGNOSIS — E78.00 HYPERCHOLESTEROLEMIA: ICD-10-CM

## 2023-05-10 DIAGNOSIS — R73.9 HYPERGLYCEMIA: ICD-10-CM

## 2023-05-10 DIAGNOSIS — Z00.00 ENCOUNTER FOR INITIAL ANNUAL WELLNESS VISIT (AWV) IN MEDICARE PATIENT: Primary | ICD-10-CM

## 2023-05-10 DIAGNOSIS — Z00.00 WELCOME TO MEDICARE PREVENTIVE VISIT: ICD-10-CM

## 2023-05-10 DIAGNOSIS — K59.09 CHRONIC CONSTIPATION: ICD-10-CM

## 2023-05-10 DIAGNOSIS — Z23 NEED FOR PROPHYLACTIC VACCINATION WITH STREPTOCOCCUS PNEUMONIAE (PNEUMOCOCCUS) AND INFLUENZA VACCINES: ICD-10-CM

## 2023-05-10 DIAGNOSIS — N39.0 RECURRENT UTI: ICD-10-CM

## 2023-05-10 NOTE — PATIENT INSTRUCTIONS
Medicare Preventive Visit Patient Instructions  Thank you for completing your Welcome to Medicare Visit or Medicare Annual Wellness Visit today  Your next wellness visit will be due in one year (5/10/2024)  The screening/preventive services that you may require over the next 5-10 years are detailed below  Some tests may not apply to you based off risk factors and/or age  Screening tests ordered at today's visit but not completed yet may show as past due  Also, please note that scanned in results may not display below  Preventive Screenings:  Service Recommendations Previous Testing/Comments   Colorectal Cancer Screening  * Colonoscopy    * Fecal Occult Blood Test (FOBT)/Fecal Immunochemical Test (FIT)  * Fecal DNA/Cologuard Test  * Flexible Sigmoidoscopy Age: 39-70 years old   Colonoscopy: every 10 years (may be performed more frequently if at higher risk)  OR  FOBT/FIT: every 1 year  OR  Cologuard: every 3 years  OR  Sigmoidoscopy: every 5 years  Screening may be recommended earlier than age 39 if at higher risk for colorectal cancer  Also, an individualized decision between you and your healthcare provider will decide whether screening between the ages of 74-80 would be appropriate  Colonoscopy: 05/06/2021  FOBT/FIT: Not on file  Cologuard: Not on file  Sigmoidoscopy: Not on file    Screening Current     Breast Cancer Screening Age: 36 years old  Frequency: every 1-2 years  Not required if history of left and right mastectomy Mammogram: 01/13/2022    Screening Current   Cervical Cancer Screening Between the ages of 21-29, pap smear recommended once every 3 years  Between the ages of 33-67, can perform pap smear with HPV co-testing every 5 years     Recommendations may differ for women with a history of total hysterectomy, cervical cancer, or abnormal pap smears in past  Pap Smear: 02/17/2023    Screening Not Indicated   Hepatitis C Screening Once for adults born between 1945 and 1965  More frequently in patients at high risk for Hepatitis C Hep C Antibody: 03/22/2019    Screening Current   Diabetes Screening 1-2 times per year if you're at risk for diabetes or have pre-diabetes Fasting glucose: 94 mg/dL (6/9/2022)  A1C: 5 3 % (4/27/2023)  Screening Current   Cholesterol Screening Once every 5 years if you don't have a lipid disorder  May order more often based on risk factors  Lipid panel: 06/09/2022    Screening Not Indicated  History Lipid Disorder     Other Preventive Screenings Covered by Medicare:  1  Abdominal Aortic Aneurysm (AAA) Screening: covered once if your at risk  You're considered to be at risk if you have a family history of AAA  2  Lung Cancer Screening: covers low dose CT scan once per year if you meet all of the following conditions: (1) Age 50-69; (2) No signs or symptoms of lung cancer; (3) Current smoker or have quit smoking within the last 15 years; (4) You have a tobacco smoking history of at least 20 pack years (packs per day multiplied by number of years you smoked); (5) You get a written order from a healthcare provider  3  Glaucoma Screening: covered annually if you're considered high risk: (1) You have diabetes OR (2) Family history of glaucoma OR (3)  aged 48 and older OR (3)  American aged 72 and older  3  Osteoporosis Screening: covered every 2 years if you meet one of the following conditions: (1) You're estrogen deficient and at risk for osteoporosis based off medical history and other findings; (2) Have a vertebral abnormality; (3) On glucocorticoid therapy for more than 3 months; (4) Have primary hyperparathyroidism; (5) On osteoporosis medications and need to assess response to drug therapy  · Last bone density test (DXA Scan): 02/06/2023   5  HIV Screening: covered annually if you're between the age of 15-65  Also covered annually if you are younger than 13 and older than 72 with risk factors for HIV infection   For pregnant patients, it is covered up to 3 times per pregnancy  Immunizations:  Immunization Recommendations   Influenza Vaccine Annual influenza vaccination during flu season is recommended for all persons aged >= 6 months who do not have contraindications   Pneumococcal Vaccine   * Pneumococcal conjugate vaccine = PCV13 (Prevnar 13), PCV15 (Vaxneuvance), PCV20 (Prevnar 20)  * Pneumococcal polysaccharide vaccine = PPSV23 (Pneumovax) Adults 25-60 years old: 1-3 doses may be recommended based on certain risk factors  Adults 72 years old: 1-2 doses may be recommended based off what pneumonia vaccine you previously received   Hepatitis B Vaccine 3 dose series if at intermediate or high risk (ex: diabetes, end stage renal disease, liver disease)   Tetanus (Td) Vaccine - COST NOT COVERED BY MEDICARE PART B Following completion of primary series, a booster dose should be given every 10 years to maintain immunity against tetanus  Td may also be given as tetanus wound prophylaxis  Tdap Vaccine - COST NOT COVERED BY MEDICARE PART B Recommended at least once for all adults  For pregnant patients, recommended with each pregnancy  Shingles Vaccine (Shingrix) - COST NOT COVERED BY MEDICARE PART B  2 shot series recommended in those aged 48 and above     Health Maintenance Due:      Topic Date Due   • Breast Cancer Screening: Mammogram  01/13/2023   • DXA SCAN  02/02/2025   • Cervical Cancer Screening  02/17/2026   • Colorectal Cancer Screening  05/06/2026   • HIV Screening  Completed   • Hepatitis C Screening  Completed     Immunizations Due:      Topic Date Due   • Pneumococcal Vaccine: 65+ Years (1 - PCV) Never done     Advance Directives   What are advance directives? Advance directives are legal documents that state your wishes and plans for medical care  These plans are made ahead of time in case you lose your ability to make decisions for yourself   Advance directives can apply to any medical decision, such as the treatments you want, and if you want to donate organs  What are the types of advance directives? There are many types of advance directives, and each state has rules about how to use them  You may choose a combination of any of the following:  · Living will: This is a written record of the treatment you want  You can also choose which treatments you do not want, which to limit, and which to stop at a certain time  This includes surgery, medicine, IV fluid, and tube feedings  · Durable power of  for healthcare [facility]): This is a written record that states who you want to make healthcare choices for you when you are unable to make them for yourself  This person, called a proxy, is usually a family member or a friend  You may choose more than 1 proxy  · Do not resuscitate (DNR) order:  A DNR order is used in case your heart stops beating or you stop breathing  It is a request not to have certain forms of treatment, such as CPR  A DNR order may be included in other types of advance directives  · Medical directive: This covers the care that you want if you are in a coma, near death, or unable to make decisions for yourself  You can list the treatments you want for each condition  Treatment may include pain medicine, surgery, blood transfusions, dialysis, IV or tube feedings, and a ventilator (breathing machine)  · Values history: This document has questions about your views, beliefs, and how you feel and think about life  This information can help others choose the care that you would choose  Why are advance directives important? An advance directive helps you control your care  Although spoken wishes may be used, it is better to have your wishes written down  Spoken wishes can be misunderstood, or not followed  Treatments may be given even if you do not want them  An advance directive may make it easier for your family to make difficult choices about your care         © Copyright [employer] 2018 Information is for End User's use only and may not be sold, redistributed or otherwise used for commercial purposes   All illustrations and images included in CareNotes® are the copyrighted property of A [employer] A M [employer]  or Tommy Dawson

## 2023-05-10 NOTE — ASSESSMENT & PLAN NOTE
Removed a moderate amount of cerumen from her right ear canal but it was tender so we stopped  She still has a little bit of cerumen behind and she is going to try Debrox drops once or twice a week

## 2023-05-10 NOTE — TELEPHONE ENCOUNTER
Patient is being referred to a orthopedics  Please schedule accordingly      [id number]   [phone]

## 2023-05-10 NOTE — PROGRESS NOTES
Assessment and Plan:     Problem List Items Addressed This Visit        Digestive    Chronic constipation     Seems to be pretty stable at this time  Endocrine    Acquired hypothyroidism    Relevant Orders    Comprehensive metabolic panel    CBC and differential    TSH, 3rd generation with Free T4 reflex       Nervous and Auditory    Impacted cerumen of right ear     Removed a moderate amount of cerumen from her right ear canal but it was tender so we stopped  She still has a little bit of cerumen behind and she is going to try Debrox drops once or twice a week  Relevant Orders    Ambulatory Referral to Hand Surgery    Ear cerumen removal (Completed)       Musculoskeletal and Integument    Pelvic floor dysfunction     She will be following with Dr Komal Mello which I think is an excellent idea  Tendinitis of right rotator cuff       Genitourinary    Recurrent UTI       Other    Hypercholesterolemia    Relevant Orders    XR wrist 3+ vw right (Completed)    Lipid Panel with Direct LDL reflex    Vitamin D deficiency    Relevant Orders    Vitamin D 25 hydroxy    Hyperglycemia    Relevant Orders    Comprehensive metabolic panel    Hemoglobin A1C    Chronic pain of right wrist    Relevant Orders    Ambulatory Referral to Hand Surgery   Other Visit Diagnoses     Encounter for initial annual wellness visit (AWV) in Medicare patient    -  Primary    Prevnar 20 today  I did recommend Shingrix at the pharmacy as well  Viral upper respiratory tract infection        Try Flonase and nasal saline    If symptoms persist consider treatment for sinusitis    Need for prophylactic vaccination with Streptococcus pneumoniae (Pneumococcus) and Influenza vaccines        Relevant Orders    Pneumococcal Conjugate Vaccine 20-valent (Pcv20) (Completed)    Welcome to Medicare preventive visit        Relevant Orders    POCT ECG (Completed)      Ear cerumen removal    Date/Time: 5/10/2023 2:15 PM  Performed by: Marcus Murphy Erinn Carballo MD  Authorized by: Quinn Rock MD   Universal Protocol:  Consent: Verbal consent obtained  Consent given by: patient      Patient location:  Bedside  Procedure details:     Location:  R ear    Procedure type: curette      Approach:  External  Post-procedure details:     Complication:  None    Hearing quality:  Normal    Patient tolerance of procedure: Tolerated well, no immediate complications  Comments:      Cerumen remained  Try Debrox  Depression Screening and Follow-up Plan: Patient was screened for depression during today's encounter  They screened negative with a PHQ-2 score of 0  Preventive health issues were discussed with patient, and age appropriate screening tests were ordered as noted in patient's After Visit Summary  Personalized health advice and appropriate referrals for health education or preventive services given if needed, as noted in patient's After Visit Summary  History of Present Illness:     Patient presents for a Medicare Wellness Visit    HPI patient presents today for Medicare wellness visit  She has some other concerns  She has some right wrist pain has been present for 2 or 3 months  She denies any acute injury  The pain is worse with lifting certain objects  She does have some nasal congestion with some yellow discharge and phlegm production  She denies fever or chills  Patient Care Team:  Quinn Rock MD as PCP - General     Review of Systems:     Review of Systems   Constitutional: Negative for appetite change, chills, fatigue, fever and unexpected weight change  HENT: Positive for rhinorrhea  Negative for trouble swallowing  Eyes: Negative for visual disturbance  Respiratory: Negative for cough, chest tightness, shortness of breath and wheezing  Cardiovascular: Negative for chest pain, palpitations and leg swelling     Gastrointestinal: Negative for abdominal distention, abdominal pain, blood in stool, constipation and diarrhea  Endocrine: Negative for polyuria  Genitourinary: Negative for difficulty urinating and flank pain  Musculoskeletal: Negative for arthralgias and myalgias  Skin: Negative for rash  Neurological: Negative for dizziness and light-headedness  Hematological: Negative for adenopathy  Does not bruise/bleed easily  Psychiatric/Behavioral: Negative for dysphoric mood and sleep disturbance  The patient is not nervous/anxious  Problem List:     Patient Active Problem List   Diagnosis   • Chronic constipation   • Hypercholesterolemia   • Acquired hypothyroidism   • Vitamin D deficiency   • Chronic neck pain   • Hip pain   • Recurrent UTI   • Osteopenia of left hip   • Pelvic floor dysfunction   • Atrophic vaginitis   • Hyperglycemia   • Overweight   • Esophageal dysphagia   • Chronic pain of left ankle   • Gross hematuria   • Tendinitis of right rotator cuff   • Impacted cerumen of right ear   • Chronic pain of right wrist      Past Medical and Surgical History:     Past Medical History:   Diagnosis Date   • Anxiety     last assessed 12/1/14, resolved 2/1/16   • Closed displaced fracture of proximal phalanx of lesser toe of right foot 9/11/2020   • Disease of thyroid gland    • Headache syndrome 5/12/2017   • Hematuria     last assessed 4/23/15, resolved 2/1/16   • Left breast mass 12/18/2019     Questionable mass on exam 2019 May be patch of more cystic breast tissue, not well defined   Will check dx mammo  Mammogram was normal 9/20   • PONV (postoperative nausea and vomiting)    • Skin neoplasm     last assessed 9/18/15, resolved 2/1/16     Past Surgical History:   Procedure Laterality Date   • COLONOSCOPY     • HYSTEROSCOPY WITH  RESECTION UTERINE TUMOR/FIBROID  03/2020   • SHOULDER SURGERY Left 2009    tear repair   • TOOTH EXTRACTION        Family History:     Family History   Problem Relation Age of Onset   • Hyperlipidemia Mother    • Cancer Father         of spine   • Stroke Father         syndrome    • No Known Problems Sister    • Breast cancer Maternal Aunt 72   • No Known Problems Maternal Aunt       Social History:     Social History     Socioeconomic History   • Marital status:      Spouse name: None   • Number of children: None   • Years of education: None   • Highest education level: None   Occupational History   • None   Tobacco Use   • Smoking status: Former     Types: Cigarettes     Quit date: 1990     Years since quittin 3   • Smokeless tobacco: Never   Vaping Use   • Vaping Use: Never used   Substance and Sexual Activity   • Alcohol use: Yes     Alcohol/week: 5 0 standard drinks     Types: 5 Glasses of wine per week     Comment: occasionally / social    • Drug use: No   • Sexual activity: Yes     Partners: Male   Other Topics Concern   • None   Social History Narrative   • None     Social Determinants of Health     Financial Resource Strain: Low Risk    • Difficulty of Paying Living Expenses: Not hard at all   Food Insecurity: Not on file   Transportation Needs: No Transportation Needs   • Lack of Transportation (Medical): No   • Lack of Transportation (Non-Medical): No   Physical Activity: Not on file   Stress: Not on file   Social Connections: Not on file   Intimate Partner Violence: Not on file   Housing Stability: Not on file      Medications and Allergies:     Current Outpatient Medications   Medication Sig Dispense Refill   • Cholecalciferol (Vitamin D3) 50 MCG (2000 UT) capsule Take 1 capsule (2,000 Units total) by mouth daily  0   • ibuprofen (MOTRIN) 200 mg tablet Take 200 mg by mouth every 6 (six) hours as needed     • levothyroxine 75 mcg tablet Take 1 tablet (75 mcg total) by mouth daily 90 tablet 3   • meloxicam (MOBIC) 15 mg tablet TAKE 1 TABLET (15 MG TOTAL) BY MOUTH DAILY  30 tablet 1     No current facility-administered medications for this visit       Allergies   Allergen Reactions   • Sulfa Antibiotics Rash      Immunizations: Immunization History   Administered Date(s) Administered   • COVID-19 MODERNA VACC 0 5 ML IM 10/29/2021, 04/08/2022   • COVID-19 Moderna Vac BIVALENT 12 Yr+ IM (BOOSTER ONLY) 0 5 ML 10/24/2022   • COVID-19 PFIZER VACCINE 0 3 ML IM 03/15/2021, 04/05/2021   • INFLUENZA 12/07/2010, 09/11/2015, 11/02/2017, 09/28/2020, 11/10/2022   • Influenza Quadrivalent Preservative Free 3 years and older IM 11/02/2017   • Influenza Quadrivalent, 6-35 Months IM 09/11/2015, 09/22/2016   • Influenza, high dose seasonal 0 7 mL 11/10/2022   • Influenza, recombinant, quadrivalent,injectable, preservative free 10/24/2018, 10/22/2019, 09/30/2020, 11/19/2021   • Pneumococcal Conjugate Vaccine 20-valent (Pcv20), Polysace 05/10/2023   • Tdap 02/01/2016      Health Maintenance:         Topic Date Due   • Breast Cancer Screening: Mammogram  01/13/2023   • DXA SCAN  02/02/2025   • Cervical Cancer Screening  02/17/2026   • Colorectal Cancer Screening  05/06/2026   • HIV Screening  Completed   • Hepatitis C Screening  Completed     There are no preventive care reminders to display for this patient  Medicare Screening Tests and Risk Assessments:     Lydia Dominique is here for her Subsequent Wellness visit  Health Risk Assessment:   Patient rates overall health as very good  Patient feels that their physical health rating is slightly worse  Patient is satisfied with their life  Eyesight was rated as same  Hearing was rated as same  Patient feels that their emotional and mental health rating is same  Patients states they are never, rarely angry  Patient states they are never, rarely unusually tired/fatigued  Pain experienced in the last 7 days has been some  Patient's pain rating has been 4/10  Patient states that she has experienced no weight loss or gain in last 6 months  Depression Screening:   PHQ-2 Score: 0      Fall Risk Screening:    In the past year, patient has experienced: no history of falling in past year      Urinary Incontinence Screening:   Patient has not leaked urine accidently in the last six months  Home Safety:  Patient does not have trouble with stairs inside or outside of their home  Patient has working smoke alarms and has working carbon monoxide detector  Home safety hazards include: none  Nutrition:   Current diet is Regular  Medications:   Patient is currently taking over-the-counter supplements  OTC medications include: see medication list  Patient is able to manage medications  Activities of Daily Living (ADLs)/Instrumental Activities of Daily Living (IADLs):   Walk and transfer into and out of bed and chair?: Yes  Dress and groom yourself?: Yes    Bathe or shower yourself?: Yes    Feed yourself? Yes  Do your laundry/housekeeping?: Yes  Manage your money, pay your bills and track your expenses?: Yes  Make your own meals?: Yes    Do your own shopping?: Yes    Previous Hospitalizations:   Any hospitalizations or ED visits within the last 12 months?: No      Advance Care Planning:   Living will: No    Durable POA for healthcare:  Yes    Advanced directive: Yes    Five wishes given: Yes    End of Life Decisions reviewed with patient: Yes      Cognitive Screening:   Provider or family/friend/caregiver concerned regarding cognition?: No    PREVENTIVE SCREENINGS      Cardiovascular Screening:    General: Screening Not Indicated and History Lipid Disorder      Diabetes Screening:     General: Screening Current      Colorectal Cancer Screening:     General: Screening Current      Breast Cancer Screening:     General: Screening Current      Cervical Cancer Screening:    General: Screening Not Indicated      Osteoporosis Screening:    General: Screening Current      Abdominal Aortic Aneurysm (AAA) Screening:        General: Screening Not Indicated      Lung Cancer Screening:     General: Screening Not Indicated      Hepatitis C Screening:    General: Screening Current    Screening, Brief Intervention, and Referral to "Treatment (SBIRT)    Screening    Typical number of drinks in a week: 5    Single Item Drug Screening:  How often have you used an illegal drug (including marijuana) or a prescription medication for non-medical reasons in the past year? never    Single Item Drug Screen Score: 0  Interpretation: Negative screen for possible drug use disorder    Vision Screening    Right eye Left eye Both eyes   Without correction 20/20 20/30 20/20   With correction           Physical Exam:     /70 (BP Location: Left arm, Patient Position: Sitting, Cuff Size: Standard)   Pulse 65   Temp 97 7 °F (36 5 °C)   Ht 5' 2\" (1 575 m)   Wt 58 5 kg (129 lb)   SpO2 97%   BMI 23 59 kg/m²     Physical Exam  Constitutional:       General: She is not in acute distress  Appearance: She is well-developed  She is not diaphoretic  HENT:      Head: Normocephalic  Right Ear: External ear normal       Left Ear: External ear normal       Nose: Nose normal    Eyes:      General: No scleral icterus  Right eye: No discharge  Left eye: No discharge  Conjunctiva/sclera: Conjunctivae normal       Pupils: Pupils are equal, round, and reactive to light  Neck:      Thyroid: No thyromegaly  Trachea: No tracheal deviation  Cardiovascular:      Rate and Rhythm: Normal rate and regular rhythm  Heart sounds: Normal heart sounds  No murmur heard  Pulmonary:      Effort: Pulmonary effort is normal  No respiratory distress  Breath sounds: Normal breath sounds  Abdominal:      General: Bowel sounds are normal  There is no distension  Palpations: Abdomen is soft  Tenderness: There is no abdominal tenderness  Musculoskeletal:         General: No tenderness or deformity  Normal range of motion  Right lower leg: No edema  Left lower leg: No edema  Lymphadenopathy:      Cervical: No cervical adenopathy  Skin:     General: Skin is warm  Coloration: Skin is not pale        Findings: No " erythema or rash  Neurological:      General: No focal deficit present  Cranial Nerves: No cranial nerve deficit        Coordination: Coordination normal    Psychiatric:         Behavior: Behavior normal           Sergey Castillo MD

## 2023-06-26 ENCOUNTER — TELEPHONE (OUTPATIENT)
Dept: OBGYN CLINIC | Facility: MEDICAL CENTER | Age: 68
End: 2023-06-26

## 2023-06-26 NOTE — TELEPHONE ENCOUNTER
----- Message from Rosi Kathleen sent at 6/22/2023  9:10 AM EDT -----    ----- Message -----  From: Karsten Rich MD  Sent: 4/5/2023   1:57 PM EDT  To: Rosi Kathleen    Can you please schedule for diagnostic hysteroscopy, D&C, polypectomy? Patient is looking at the fall

## 2023-07-06 ENCOUNTER — HOSPITAL ENCOUNTER (OUTPATIENT)
Dept: MAMMOGRAPHY | Facility: MEDICAL CENTER | Age: 68
Discharge: HOME | End: 2023-07-06
Payer: COMMERCIAL

## 2023-07-06 VITALS — WEIGHT: 128.97 LBS | BODY MASS INDEX: 23.73 KG/M2 | HEIGHT: 62 IN

## 2023-07-06 DIAGNOSIS — Z12.31 ENCOUNTER FOR SCREENING MAMMOGRAM FOR MALIGNANT NEOPLASM OF BREAST: ICD-10-CM

## 2023-07-06 PROCEDURE — 77067 SCR MAMMO BI INCL CAD: CPT

## 2023-07-06 PROCEDURE — 77063 BREAST TOMOSYNTHESIS BI: CPT

## 2023-07-09 PROBLEM — H61.21 IMPACTED CERUMEN OF RIGHT EAR: Status: RESOLVED | Noted: 2023-05-10 | Resolved: 2023-07-09

## 2023-08-10 DIAGNOSIS — E03.9 ACQUIRED HYPOTHYROIDISM: ICD-10-CM

## 2023-08-10 RX ORDER — LEVOTHYROXINE SODIUM 0.07 MG/1
75 TABLET ORAL DAILY
Qty: 90 TABLET | Refills: 3 | Status: SHIPPED | OUTPATIENT
Start: 2023-08-10

## 2023-09-18 ENCOUNTER — CLINICAL SUPPORT (OUTPATIENT)
Dept: FAMILY MEDICINE CLINIC | Facility: CLINIC | Age: 68
End: 2023-09-18
Payer: COMMERCIAL

## 2023-09-18 DIAGNOSIS — Z23 NEED FOR INFLUENZA VACCINATION: Primary | ICD-10-CM

## 2023-09-18 PROCEDURE — 90662 IIV NO PRSV INCREASED AG IM: CPT

## 2023-09-18 PROCEDURE — G0008 ADMIN INFLUENZA VIRUS VAC: HCPCS

## 2023-09-18 PROCEDURE — 96372 THER/PROPH/DIAG INJ SC/IM: CPT

## 2023-11-01 ENCOUNTER — RA CDI HCC (OUTPATIENT)
Dept: OTHER | Facility: HOSPITAL | Age: 68
End: 2023-11-01

## 2023-11-06 ENCOUNTER — APPOINTMENT (OUTPATIENT)
Dept: LAB | Facility: MEDICAL CENTER | Age: 68
End: 2023-11-06
Payer: COMMERCIAL

## 2023-11-06 DIAGNOSIS — R73.9 HYPERGLYCEMIA: ICD-10-CM

## 2023-11-06 DIAGNOSIS — E78.00 HYPERCHOLESTEROLEMIA: ICD-10-CM

## 2023-11-06 DIAGNOSIS — E55.9 VITAMIN D DEFICIENCY: ICD-10-CM

## 2023-11-06 DIAGNOSIS — E03.9 ACQUIRED HYPOTHYROIDISM: ICD-10-CM

## 2023-11-06 LAB
25(OH)D3 SERPL-MCNC: 35.6 NG/ML (ref 30–100)
ALBUMIN SERPL BCP-MCNC: 4.3 G/DL (ref 3.5–5)
ALP SERPL-CCNC: 44 U/L (ref 34–104)
ALT SERPL W P-5'-P-CCNC: 17 U/L (ref 7–52)
ANION GAP SERPL CALCULATED.3IONS-SCNC: 6 MMOL/L
AST SERPL W P-5'-P-CCNC: 17 U/L (ref 13–39)
BASOPHILS # BLD AUTO: 0.04 THOUSANDS/ÂΜL (ref 0–0.1)
BASOPHILS NFR BLD AUTO: 1 % (ref 0–1)
BILIRUB SERPL-MCNC: 0.82 MG/DL (ref 0.2–1)
BUN SERPL-MCNC: 15 MG/DL (ref 5–25)
CALCIUM SERPL-MCNC: 9.6 MG/DL (ref 8.4–10.2)
CHLORIDE SERPL-SCNC: 103 MMOL/L (ref 96–108)
CHOLEST SERPL-MCNC: 218 MG/DL
CO2 SERPL-SCNC: 30 MMOL/L (ref 21–32)
CREAT SERPL-MCNC: 0.54 MG/DL (ref 0.6–1.3)
EOSINOPHIL # BLD AUTO: 0.18 THOUSAND/ÂΜL (ref 0–0.61)
EOSINOPHIL NFR BLD AUTO: 4 % (ref 0–6)
ERYTHROCYTE [DISTWIDTH] IN BLOOD BY AUTOMATED COUNT: 12.5 % (ref 11.6–15.1)
EST. AVERAGE GLUCOSE BLD GHB EST-MCNC: 114 MG/DL
GFR SERPL CREATININE-BSD FRML MDRD: 98 ML/MIN/1.73SQ M
GLUCOSE P FAST SERPL-MCNC: 102 MG/DL (ref 65–99)
HBA1C MFR BLD: 5.6 %
HCT VFR BLD AUTO: 38.6 % (ref 34.8–46.1)
HDLC SERPL-MCNC: 64 MG/DL
HGB BLD-MCNC: 12.9 G/DL (ref 11.5–15.4)
IMM GRANULOCYTES # BLD AUTO: 0.01 THOUSAND/UL (ref 0–0.2)
IMM GRANULOCYTES NFR BLD AUTO: 0 % (ref 0–2)
LDLC SERPL CALC-MCNC: 134 MG/DL (ref 0–100)
LYMPHOCYTES # BLD AUTO: 2.08 THOUSANDS/ÂΜL (ref 0.6–4.47)
LYMPHOCYTES NFR BLD AUTO: 45 % (ref 14–44)
MCH RBC QN AUTO: 32.3 PG (ref 26.8–34.3)
MCHC RBC AUTO-ENTMCNC: 33.4 G/DL (ref 31.4–37.4)
MCV RBC AUTO: 97 FL (ref 82–98)
MONOCYTES # BLD AUTO: 0.39 THOUSAND/ÂΜL (ref 0.17–1.22)
MONOCYTES NFR BLD AUTO: 9 % (ref 4–12)
NEUTROPHILS # BLD AUTO: 1.87 THOUSANDS/ÂΜL (ref 1.85–7.62)
NEUTS SEG NFR BLD AUTO: 41 % (ref 43–75)
NRBC BLD AUTO-RTO: 0 /100 WBCS
PLATELET # BLD AUTO: 279 THOUSANDS/UL (ref 149–390)
PMV BLD AUTO: 10.3 FL (ref 8.9–12.7)
POTASSIUM SERPL-SCNC: 4.2 MMOL/L (ref 3.5–5.3)
PROT SERPL-MCNC: 6.5 G/DL (ref 6.4–8.4)
RBC # BLD AUTO: 4 MILLION/UL (ref 3.81–5.12)
SODIUM SERPL-SCNC: 139 MMOL/L (ref 135–147)
TRIGL SERPL-MCNC: 100 MG/DL
TSH SERPL DL<=0.05 MIU/L-ACNC: 0.93 UIU/ML (ref 0.45–4.5)
WBC # BLD AUTO: 4.57 THOUSAND/UL (ref 4.31–10.16)

## 2023-11-06 PROCEDURE — 84443 ASSAY THYROID STIM HORMONE: CPT

## 2023-11-06 PROCEDURE — 83036 HEMOGLOBIN GLYCOSYLATED A1C: CPT

## 2023-11-06 PROCEDURE — 82306 VITAMIN D 25 HYDROXY: CPT

## 2023-11-06 PROCEDURE — 80053 COMPREHEN METABOLIC PANEL: CPT

## 2023-11-06 PROCEDURE — 80061 LIPID PANEL: CPT

## 2023-11-06 PROCEDURE — 36415 COLL VENOUS BLD VENIPUNCTURE: CPT

## 2023-11-06 PROCEDURE — 85025 COMPLETE CBC W/AUTO DIFF WBC: CPT

## 2023-11-10 ENCOUNTER — TELEPHONE (OUTPATIENT)
Dept: FAMILY MEDICINE CLINIC | Facility: CLINIC | Age: 68
End: 2023-11-10

## 2023-11-10 ENCOUNTER — OFFICE VISIT (OUTPATIENT)
Dept: FAMILY MEDICINE CLINIC | Facility: CLINIC | Age: 68
End: 2023-11-10
Payer: COMMERCIAL

## 2023-11-10 VITALS
OXYGEN SATURATION: 100 % | TEMPERATURE: 98.9 F | BODY MASS INDEX: 23.19 KG/M2 | HEIGHT: 62 IN | DIASTOLIC BLOOD PRESSURE: 72 MMHG | SYSTOLIC BLOOD PRESSURE: 120 MMHG | WEIGHT: 126 LBS | HEART RATE: 64 BPM

## 2023-11-10 DIAGNOSIS — K59.09 CHRONIC CONSTIPATION: ICD-10-CM

## 2023-11-10 DIAGNOSIS — E55.9 VITAMIN D DEFICIENCY: ICD-10-CM

## 2023-11-10 DIAGNOSIS — R73.9 HYPERGLYCEMIA: ICD-10-CM

## 2023-11-10 DIAGNOSIS — E03.9 ACQUIRED HYPOTHYROIDISM: Primary | ICD-10-CM

## 2023-11-10 DIAGNOSIS — E78.00 HYPERCHOLESTEROLEMIA: ICD-10-CM

## 2023-11-10 DIAGNOSIS — M54.2 CHRONIC NECK PAIN: ICD-10-CM

## 2023-11-10 DIAGNOSIS — G89.29 CHRONIC NECK PAIN: ICD-10-CM

## 2023-11-10 DIAGNOSIS — R13.19 ESOPHAGEAL DYSPHAGIA: ICD-10-CM

## 2023-11-10 PROBLEM — E66.3 OVERWEIGHT: Status: RESOLVED | Noted: 2020-03-05 | Resolved: 2023-11-10

## 2023-11-10 PROCEDURE — 99214 OFFICE O/P EST MOD 30 MIN: CPT | Performed by: FAMILY MEDICINE

## 2023-11-10 RX ORDER — ACETAMINOPHEN 160 MG
TABLET,DISINTEGRATING ORAL
Refills: 0 | Status: SHIPPED
Start: 2023-11-10

## 2023-11-10 NOTE — ASSESSMENT & PLAN NOTE
I was able to curette and lavage moderate amount of cerumen from her right ear canal.  She does have some slight bleeding from the posterior canal and some pain with persistent lavage so I am going to stop and get ENT to evaluate this.

## 2023-11-10 NOTE — TELEPHONE ENCOUNTER
L/m for patient informing her that Dr. Gary Sam was made aware by Dr. Yokasta Padilla, ENT, to have patient arrive at his office ASAP for cerumen removal.

## 2023-11-10 NOTE — PROGRESS NOTES
Assessment/Plan:       Problem List Items Addressed This Visit        Digestive    Chronic constipation    Relevant Orders    CBC and differential    TSH, 3rd generation with Free T4 reflex    Esophageal dysphagia       Endocrine    Acquired hypothyroidism - Primary    Relevant Orders    TSH, 3rd generation with Free T4 reflex       Other    Hypercholesterolemia    Relevant Orders    CBC and differential    Comprehensive metabolic panel    Lipid Panel with Direct LDL reflex    Vitamin D deficiency    Relevant Medications    Cholecalciferol (Vitamin D3) 50 MCG (2000 UT) capsule    Other Relevant Orders    Vitamin D 25 hydroxy    Chronic neck pain    Relevant Orders    Ambulatory referral to Physical Therapy    Hyperglycemia    Relevant Orders    CBC and differential    Hemoglobin A1C         Subjective:      Patient ID: Davide Velazquez is a 79 y.o. female. HPI patient resents today for follow-up of chronic health issues. Overall, she doing pretty well. She has a history of hypothyroidism and remains compliant with levothyroxine. She denies excessive fatigue. She does have some intermittent constipation. Dysphagia has pretty much improved thankfully. The following portions of the patient's history were reviewed and updated as appropriate: allergies, current medications, past family history, past medical history, past social history, past surgical history and problem list.      Current Outpatient Medications:   •  Cholecalciferol (Vitamin D3) 50 MCG (2000 UT) capsule, 2 tabs daily, Disp: , Rfl: 0  •  ibuprofen (MOTRIN) 200 mg tablet, Take 200 mg by mouth every 6 (six) hours as needed, Disp: , Rfl:   •  levothyroxine 75 mcg tablet, Take 1 tablet (75 mcg total) by mouth daily, Disp: 90 tablet, Rfl: 3  •  meloxicam (MOBIC) 15 mg tablet, TAKE 1 TABLET (15 MG TOTAL) BY MOUTH DAILY. , Disp: 30 tablet, Rfl: 1     Review of Systems   Constitutional:  Negative for appetite change, chills, fatigue, fever and unexpected weight change. HENT:  Negative for trouble swallowing. Eyes:  Negative for visual disturbance. Respiratory:  Negative for cough, chest tightness, shortness of breath and wheezing. Cardiovascular:  Negative for chest pain, palpitations and leg swelling. Gastrointestinal:  Negative for abdominal distention, abdominal pain, blood in stool, constipation and diarrhea. Endocrine: Negative for polyuria. Genitourinary:  Negative for difficulty urinating and flank pain. Musculoskeletal:  Positive for myalgias. Negative for arthralgias. Skin:  Negative for rash. Neurological:  Negative for dizziness and light-headedness. Hematological:  Negative for adenopathy. Does not bruise/bleed easily. Psychiatric/Behavioral:  Negative for dysphoric mood and sleep disturbance. The patient is not nervous/anxious. Objective:      /72 (BP Location: Left arm, Patient Position: Sitting, Cuff Size: Adult)   Pulse 64   Temp 98.9 °F (37.2 °C) (Tympanic)   Ht 5' 2" (1.575 m)   Wt 57.2 kg (126 lb)   SpO2 100%   BMI 23.05 kg/m²          Physical Exam  Vitals reviewed. Constitutional:       General: She is not in acute distress. Appearance: She is well-developed. She is not diaphoretic. HENT:      Head: Normocephalic. Right Ear: There is impacted cerumen. Left Ear: There is no impacted cerumen. Eyes:      General:         Right eye: No discharge. Left eye: No discharge. Pupils: Pupils are equal, round, and reactive to light. Neck:      Thyroid: No thyromegaly. Trachea: No tracheal deviation. Cardiovascular:      Rate and Rhythm: Normal rate and regular rhythm. Heart sounds: Normal heart sounds. No murmur heard. Pulmonary:      Effort: Pulmonary effort is normal. No respiratory distress. Breath sounds: No wheezing or rales. Abdominal:      General: There is no distension. Palpations: Abdomen is soft. Tenderness:  There is no abdominal tenderness. Musculoskeletal:         General: Normal range of motion. Right lower leg: No edema. Left lower leg: No edema. Lymphadenopathy:      Cervical: No cervical adenopathy. Skin:     General: Skin is warm. Findings: No erythema. Neurological:      General: No focal deficit present. Mental Status: She is alert and oriented to person, place, and time. Gait: Gait normal.   Psychiatric:         Thought Content:  Thought content normal.         Judgment: Judgment normal.           Raphael Marques MD

## 2023-12-27 ENCOUNTER — ANESTHESIA EVENT (OUTPATIENT)
Dept: PERIOP | Facility: HOSPITAL | Age: 68
End: 2023-12-27
Payer: COMMERCIAL

## 2023-12-27 RX ORDER — MULTIVITAMIN
1 TABLET ORAL DAILY
COMMUNITY

## 2023-12-27 RX ORDER — CONJUGATED ESTROGENS 0.62 MG/G
0.5 CREAM VAGINAL 3 TIMES WEEKLY
COMMUNITY
Start: 2023-08-03

## 2023-12-27 NOTE — PRE-PROCEDURE INSTRUCTIONS
Pre-Surgery Instructions:   Medication Instructions    Calcium Carbonate-Vitamin D (CALCIUM-D PO) Stop taking 7 days prior to surgery.    Cholecalciferol (Vitamin D3) 50 MCG (2000 UT) capsule Stop taking 7 days prior to surgery.    estrogens, conjugated (Premarin) vaginal cream Hold day of surgery.    ibuprofen (MOTRIN) 200 mg tablet Stop taking 7 days prior to DOS -per surgeon office instruction    levothyroxine 75 mcg tablet Take day of surgery. With sip of water     meloxicam (MOBIC) 15 mg tablet Stop taking 7 days prior to surgery.- instructed to hold as directed on NSAIDS per surgeon office ( 7 days)    Multiple Vitamin (multivitamin) tablet Stop taking 7 days prior to surgery.    Pt has cleanser and instructions for showering both night before and DOS - instructed pt on use of cleanser - pt verbalized understanding of these instructions.  Pt also instructed on no razor blade shaving one week prior to surgery -ok to use electric shaver up till 1/6/23 - no shaving 1/7, or 1/8.    Pt instructed if with any health status changes between now and DOS - notify surgeon office.  Pt instructed on no hair or body products on skin for DOS    Pt did question need of medical clearance - instructed to verify with surgeon office - PCP last visit 11/10/23 - pt aware to call surgeon office and confirm.    Medication instructions for day surgery reviewed. Please use only a sip of water to take your instructed medications. Avoid all over the counter vitamins, supplements and NSAIDS for one week prior to surgery per anesthesia guidelines. Tylenol is ok to take as needed.     You will receive a call one business day prior to surgery with an arrival time and hospital directions. If your surgery is scheduled on a Monday, the hospital will be calling you on the Friday prior to your surgery. If you have not heard from anyone by 8pm, please call the hospital supervisor through the hospital  at [phone]. (Haris  [phone]).    Do not eat or drink anything after midnight the night before your surgery, including candy, mints, lifesavers, or chewing gum. Do not drink alcohol 24hrs before your surgery. Try not to smoke at least 24hrs before your surgery.       Follow the pre surgery showering instructions as listed in the “My Surgical Experience Booklet” or otherwise provided by your surgeon's office. Do not use a blade to shave the surgical area 1 week before surgery. It is okay to use a clean electric clippers up to 24 hours before surgery. Do not apply any lotions, creams, including makeup, cologne, deodorant, or perfumes after showering on the day of your surgery. Do not use dry shampoo, hair spray, hair gel, or any type of hair products.     No contact lenses, eye make-up, or artificial eyelashes. Remove nail polish, including gel polish, and any artificial, gel, or acrylic nails if possible. Remove all jewelry including rings and body piercing jewelry.     Wear causal clothing that is easy to take on and off. Consider your type of surgery.    Keep any valuables, jewelry, piercings at home. Please bring any specially ordered equipment (sling, braces) if indicated.    Arrange for a responsible person to drive you to and from the hospital on the day of your surgery. Visitor Guidelines discussed.     Call the surgeon's office with any new illnesses, exposures, or additional questions prior to surgery.    Please reference your “My Surgical Experience Booklet” for additional information to prepare for your upcoming surgery.

## 2023-12-29 ENCOUNTER — APPOINTMENT (OUTPATIENT)
Dept: LAB | Facility: HOSPITAL | Age: 68
End: 2023-12-29
Payer: COMMERCIAL

## 2023-12-29 DIAGNOSIS — Z01.812 PRE-OPERATIVE LABORATORY EXAMINATION: ICD-10-CM

## 2023-12-29 DIAGNOSIS — R39.15 URGENCY OF URINATION: ICD-10-CM

## 2023-12-29 LAB
ATRIAL RATE: 71 BPM
P AXIS: 31 DEGREES
PR INTERVAL: 128 MS
QRS AXIS: 17 DEGREES
QRSD INTERVAL: 82 MS
QT INTERVAL: 404 MS
QTC INTERVAL: 439 MS
T WAVE AXIS: 55 DEGREES
VENTRICULAR RATE: 71 BPM

## 2023-12-29 PROCEDURE — 93010 ELECTROCARDIOGRAM REPORT: CPT | Performed by: INTERNAL MEDICINE

## 2024-01-02 NOTE — DISCHARGE INSTRUCTIONS
Apical Vaginal Repair (EnPlace System)    WHAT YOU NEED TO KNOW:   An apical vaginal repair is a procedure to lift the cervix, vagina  and surrounding structures to the normal anatomical position. This can help prevent you from having a bulge sensation in the vagina. The procedure is also called an EnPlace procedure.       **** VAGINAL PACKING WAS PLACED IN THE VAGINA- This packing will need to be removed at home on POD #3 ****      DISCHARGE INSTRUCTIONS:   Medicines:   Pain medicine:    You may need medicine to take away or decrease pain.     Learn how to take your medicine. Ask what medicine and how much you should take. Be sure you know how, when, and how often to take it.    Do not wait until the pain is severe before you take your medicine. Tell caregivers if your pain does not decrease.    Pain medicine can make you dizzy or sleepy. Prevent falls by calling someone when you get out of bed or if you need help.    Antibiotics:  This medicine is given to fight or prevent an infection caused by bacteria. Always take your antibiotics exactly as ordered by your healthcare provider. Do not stop taking your medicine unless directed by your healthcare provider. Never save antibiotics or take leftover antibiotics that were given to you for another illness.    Take your medicine as directed.  Contact your healthcare provider if you think your medicine is not helping or if you have side effects. Tell him or her if you are allergic to any medicine. Keep a list of the medicines, vitamins, and herbs you take. Include the amounts, and when and why you take them. Bring the list or the pill bottles to follow-up visits. Carry your medicine list with you in case of an emergency.  Follow up with your healthcare provider as directed:  Write down your questions so you remember to ask them during your visits.   Self-care:   Vaginal packing: Vaginal packing was placed into the vagina. You will remove this packing on POD #3 early in  the morning. (This is the 3rd day after your surgery). The office will call to remind you to remove this packing.     Sex:  Do not have sex until your healthcare provider says it is okay.    Kegel exercises:  To do kegel exercises, squeeze your pelvic floor muscles for 5 to 10 seconds, then release. Regular kegel exercises will help your pelvic floor muscles become stronger. This will help prevent you from leaking urine. Ask your healthcare provider when to start these exercises and how often to do them.    Sanitary pad:  Change your sanitary pad regularly. Keep track of how often you change the pad.    Driver catheter:  Keep the bag below your waist. This will help prevent infection and other problems caused by urine flowing back into your bladder. Do not pull on the catheter because this can cause pain and bleeding, and the catheter could come out. Keep the catheter tubing free of kinks so your urine will flow into the bag. Your healthcare provider will remove the catheter as soon as possible, to help prevent infection.    Wound care:  When you are allowed to bathe or shower, carefully wash your vaginal area with soap and water.     Do not put pressure on your abdomen:  This will help prevent damage to your surgery area. Do not strain, lift heavy objects, or stand for a very long time. Do not perform strenuous exercises, such as running and weight lifting.    Activity:  You may need to start walking within a few days after your procedure. Ask your healthcare provider when to start and how long you should walk. Ask about any other exercises that may be right for you.    Support socks:  You may need to wear support socks. These are tight socks that help increase the circulation in your legs until you are more active. This helps prevent blood clots.  Contact your healthcare provider if:   You soak a sanitary pad with blood every hour for 4 hours.    You have vaginal pain that does not go away even after you take pain  medicine.    You have pus or a foul-smelling discharge from your genital area.     You see blood in your urine.    You have pain during sex.    You have a fever, chills, a cough, or feel weak and achy.    You have nausea and vomiting.    You have questions or concerns about your condition or care.  Seek care immediately or call 911 if:   You feel something is bulging out into your vagina or rectum and not going back in.    You cannot urinate.    Your arm or leg feels warm, tender, and painful. It may look swollen and red.    You suddenly feel lightheaded and short of breath.    You have chest pain. You may have more pain when you take a deep breath or cough. You may cough up blood.  © 2017 [employer] Information is for End User's use only and may not be sold, redistributed or otherwise used for commercial purposes. All illustrations and images included in CareNotes® are the copyrighted property of [employer] or [employer].  The above information is an  only. It is not intended as medical advice for individual conditions or treatments. Talk to your doctor, nurse or pharmacist before following any medical regimen to see if it is safe and effective for you.    Urethral Sling Procedure   WHAT YOU NEED TO KNOW:   A bladder sling procedure is surgery to treat urinary incontinence in women. The sling acts as a hammock to keep your urethra in place and hold it closed when your bladder is full. You may have vaginal bleeding or discharge for up to a week after your surgery. Use sanitary pads. Do not use tampons. You may have some pelvic discomfort or trouble urinating.   DISCHARGE INSTRUCTIONS:   Call 911 for any of the following:   You have sudden trouble breathing.    Seek care immediately if:   Your bleeding gets worse.    You have yellow or foul smelling discharge from your vagina.    You cannot urinate, or you are urinating less than what is normal for you.    You feel  confused.  Contact your healthcare provider if:   You have a fever.    You do not feel like you are able to empty your bladder completely when you urinate.    You feel the need to urinate very suddenly.    You have burning or stinging when you urinate.    You have blood in your urine.    Your skin is itchy, swollen, or you have a rash.    You have questions or concerns about your condition or care.  Medicines:   Prescription pain medicine  may be given. Ask your how to take this medicine safely.    Take your medicine as directed.  Contact your healthcare provider if you think your medicine is not helping or if you have side effects. Tell him or her if you are allergic to any medicine. Keep a list of the medicines, vitamins, and herbs you take. Include the amounts, and when and why you take them. Bring the list or the pill bottles to follow-up visits. Carry your medicine list with you in case of an emergency.  Self-catheterization:  You may need to put a catheter into your bladder after you urinate to empty any remaining urine. A catheter is a small rubber tube used to drain urine. Healthcare providers will teach you how to put the catheter in safely. This may be needed until you are completely emptying your bladder.  Driver catheter:  You may have a Driver catheter for a short period of time. The Driver is a tube put into your bladder to drain urine into a bag. Keep the bag below your waist. This will prevent urine from flowing back into your bladder and causing an infection or other problems. Also, keep the tube free of kinks so the urine will drain properly. Do not pull on the catheter. This can cause pain and bleeding, and may cause the catheter to come out.   Activity:  Do not lift heavy objects for 6 weeks after your procedure. Do not have intercourse for 4 to 6 weeks. Do not use a tampon for 4 weeks. Ask your healthcare provider when you can return to work or your usual activities.  Do pelvic muscle exercises:   These are also called Kegel exercises. These exercises help strengthen your pelvic muscles and help prevent urine leakage. Tighten the muscles of your pelvis and hold them tight for 5 seconds, then relax for 5 seconds. Gradually work up to tightening them for 10 seconds and relaxing for 10 seconds. Do this 3 times each day.  Keep a record:  Keep a record of when you urinate and if you leak any urine. Write down what you were doing when you leaked urine, such as coughing or sneezing. Bring the log to your follow-up visits.  Prevent constipation:  Drink liquids as directed. You may need to drink more water than usual to soften your bowel movements. Eat a variety of healthy foods, especially fruit and foods high in fiber. You may need to use an over-the-counter bowel movement softener.  Follow up with your healthcare provider as directed:  You may need a test to check how much urine remains in your bladder after you urinate. This will help show how the sling is working. Write down your questions so you remember to ask them during your visits.  © 2017 [employer] Information is for End User's use only and may not be sold, redistributed or otherwise used for commercial purposes. All illustrations and images included in CareNotes® are the copyrighted property of [employer] or [employer].  The above information is an  only. It is not intended as medical advice for individual conditions or treatments. Talk to your doctor, nurse or pharmacist before following any medical regimen to see if it is safe and effective for you.

## 2024-01-07 PROBLEM — Z98.890 PONV (POSTOPERATIVE NAUSEA AND VOMITING): Status: ACTIVE | Noted: 2024-01-07

## 2024-01-07 PROBLEM — R11.2 PONV (POSTOPERATIVE NAUSEA AND VOMITING): Status: ACTIVE | Noted: 2024-01-07

## 2024-01-07 NOTE — ANESTHESIA PREPROCEDURE EVALUATION
Procedure:  COLPOSUSPENSION VAGINAL EXTRAPERITONEAL(ENPLACE) (Vagina )  A&P COLPORRHAPHY (Perineum)  PV SLING (Vagina )  CYSTO (Bladder)  (D&C) (Uterus)  HYSTEROSCOPY (Uterus)    Relevant Problems   ANESTHESIA   (+) PONV (postoperative nausea and vomiting)      CARDIO   (+) Hypercholesterolemia      ENDO   (+) Acquired hypothyroidism      GI/HEPATIC   (+) Esophageal dysphagia      MUSCULOSKELETAL   (+) Pelvic floor dysfunction      NEURO/PSYCH   (+) Chronic neck pain   (+) Chronic pain of left ankle   (+) Chronic pain of right wrist      PULMONARY  Former smoker        Physical Exam    Airway       Dental       Cardiovascular      Pulmonary      Other Findings  post-pubertal.      Anesthesia Plan  ASA Score- 2     Anesthesia Type- general with ASA Monitors.         Additional Monitors:     Airway Plan:            Plan Factors-Exercise tolerance (METS): >4 METS.    Chart reviewed. EKG reviewed.  Existing labs reviewed. Patient summary reviewed.    Patient is not a current smoker.              Induction- intravenous.    Postoperative Plan-     Informed Consent- Anesthetic plan and risks discussed with patient.

## 2024-01-08 ENCOUNTER — ANESTHESIA (OUTPATIENT)
Dept: PERIOP | Facility: HOSPITAL | Age: 69
End: 2024-01-08
Payer: COMMERCIAL

## 2024-01-08 ENCOUNTER — HOSPITAL ENCOUNTER (OUTPATIENT)
Facility: HOSPITAL | Age: 69
Setting detail: OUTPATIENT SURGERY
Discharge: HOME | End: 2024-01-08
Attending: OBSTETRICS & GYNECOLOGY | Admitting: OBSTETRICS & GYNECOLOGY
Payer: COMMERCIAL

## 2024-01-08 VITALS
SYSTOLIC BLOOD PRESSURE: 134 MMHG | HEART RATE: 71 BPM | OXYGEN SATURATION: 99 % | HEIGHT: 62 IN | BODY MASS INDEX: 24.71 KG/M2 | WEIGHT: 134.26 LBS | TEMPERATURE: 97.8 F | RESPIRATION RATE: 16 BRPM | DIASTOLIC BLOOD PRESSURE: 67 MMHG

## 2024-01-08 DIAGNOSIS — N81.11 CYSTOCELE, MIDLINE: ICD-10-CM

## 2024-01-08 DIAGNOSIS — N36.41 HYPERMOBILITY OF URETHRA: ICD-10-CM

## 2024-01-08 DIAGNOSIS — N81.84 PELVIC MUSCLE WASTING: ICD-10-CM

## 2024-01-08 DIAGNOSIS — N81.89 OTHER FEMALE GENITAL PROLAPSE: ICD-10-CM

## 2024-01-08 DIAGNOSIS — N39.3 STRESS INCONTINENCE (FEMALE) (MALE): ICD-10-CM

## 2024-01-08 DIAGNOSIS — N81.2 INCOMPLETE UTEROVAGINAL PROLAPSE: ICD-10-CM

## 2024-01-08 DIAGNOSIS — N95.0 POSTMENOPAUSAL BLEEDING: ICD-10-CM

## 2024-01-08 DIAGNOSIS — N81.6 RECTOCELE: ICD-10-CM

## 2024-01-08 PROCEDURE — 88305 TISSUE EXAM BY PATHOLOGIST: CPT | Performed by: PATHOLOGY

## 2024-01-08 PROCEDURE — 51798 US URINE CAPACITY MEASURE: CPT | Performed by: OBSTETRICS & GYNECOLOGY

## 2024-01-08 PROCEDURE — C1771 REP DEV, URINARY, W/SLING: HCPCS | Performed by: OBSTETRICS & GYNECOLOGY

## 2024-01-08 DEVICE — THE NEUGUIDE™ IS A SINGLE USE TRANS-VAGINAL DEVICE FOR THE REPAIR OF PELVIC ORGAN PROLAPSE (POP) BY ANCHORING SUTURES TO LIGAMENTS OF THE PELVIC FLOOR.
Type: IMPLANTABLE DEVICE | Site: BLADDER | Status: FUNCTIONAL
Brand: NEUGUIDE™

## 2024-01-08 DEVICE — SINGLE INCISION SLING SYSTEM
Type: IMPLANTABLE DEVICE | Site: BLADDER | Status: FUNCTIONAL
Brand: ALTIS

## 2024-01-08 RX ORDER — KETAMINE HCL IN NACL, ISO-OSM 100MG/10ML
SYRINGE (ML) INJECTION AS NEEDED
Status: DISCONTINUED | OUTPATIENT
Start: 2024-01-08 | End: 2024-01-08

## 2024-01-08 RX ORDER — FUROSEMIDE 10 MG/ML
INJECTION INTRAMUSCULAR; INTRAVENOUS AS NEEDED
Status: DISCONTINUED | OUTPATIENT
Start: 2024-01-08 | End: 2024-01-08

## 2024-01-08 RX ORDER — PROPOFOL 10 MG/ML
INJECTION, EMULSION INTRAVENOUS CONTINUOUS PRN
Status: DISCONTINUED | OUTPATIENT
Start: 2024-01-08 | End: 2024-01-08

## 2024-01-08 RX ORDER — DEXAMETHASONE SODIUM PHOSPHATE 10 MG/ML
INJECTION, SOLUTION INTRAMUSCULAR; INTRAVENOUS AS NEEDED
Status: DISCONTINUED | OUTPATIENT
Start: 2024-01-08 | End: 2024-01-08

## 2024-01-08 RX ORDER — PROPOFOL 10 MG/ML
INJECTION, EMULSION INTRAVENOUS AS NEEDED
Status: DISCONTINUED | OUTPATIENT
Start: 2024-01-08 | End: 2024-01-08

## 2024-01-08 RX ORDER — IBUPROFEN 600 MG/1
600 TABLET ORAL EVERY 6 HOURS PRN
Status: DISCONTINUED | OUTPATIENT
Start: 2024-01-08 | End: 2024-01-08 | Stop reason: HOSPADM

## 2024-01-08 RX ORDER — ONDANSETRON 2 MG/ML
4 INJECTION INTRAMUSCULAR; INTRAVENOUS EVERY 6 HOURS PRN
Status: DISCONTINUED | OUTPATIENT
Start: 2024-01-08 | End: 2024-01-08 | Stop reason: HOSPADM

## 2024-01-08 RX ORDER — SODIUM CHLORIDE 9 MG/ML
125 INJECTION, SOLUTION INTRAVENOUS CONTINUOUS
Status: DISCONTINUED | OUTPATIENT
Start: 2024-01-08 | End: 2024-01-08 | Stop reason: HOSPADM

## 2024-01-08 RX ORDER — FENTANYL CITRATE 50 UG/ML
25 INJECTION, SOLUTION INTRAMUSCULAR; INTRAVENOUS
Status: DISCONTINUED | OUTPATIENT
Start: 2024-01-08 | End: 2024-01-08 | Stop reason: HOSPADM

## 2024-01-08 RX ORDER — SODIUM CHLORIDE 9 MG/ML
INJECTION INTRAVENOUS AS NEEDED
Status: DISCONTINUED | OUTPATIENT
Start: 2024-01-08 | End: 2024-01-08 | Stop reason: HOSPADM

## 2024-01-08 RX ORDER — FENTANYL CITRATE 50 UG/ML
INJECTION, SOLUTION INTRAMUSCULAR; INTRAVENOUS AS NEEDED
Status: DISCONTINUED | OUTPATIENT
Start: 2024-01-08 | End: 2024-01-08

## 2024-01-08 RX ORDER — CEFAZOLIN SODIUM 2 G/50ML
2000 SOLUTION INTRAVENOUS ONCE
Status: COMPLETED | OUTPATIENT
Start: 2024-01-08 | End: 2024-01-08

## 2024-01-08 RX ORDER — ACETAMINOPHEN 325 MG/1
975 TABLET ORAL EVERY 6 HOURS PRN
Status: DISCONTINUED | OUTPATIENT
Start: 2024-01-08 | End: 2024-01-08 | Stop reason: HOSPADM

## 2024-01-08 RX ORDER — ONDANSETRON 2 MG/ML
INJECTION INTRAMUSCULAR; INTRAVENOUS AS NEEDED
Status: DISCONTINUED | OUTPATIENT
Start: 2024-01-08 | End: 2024-01-08

## 2024-01-08 RX ORDER — LIDOCAINE HYDROCHLORIDE 20 MG/ML
INJECTION, SOLUTION EPIDURAL; INFILTRATION; INTRACAUDAL; PERINEURAL AS NEEDED
Status: DISCONTINUED | OUTPATIENT
Start: 2024-01-08 | End: 2024-01-08

## 2024-01-08 RX ORDER — KETOROLAC TROMETHAMINE 30 MG/ML
INJECTION, SOLUTION INTRAMUSCULAR; INTRAVENOUS AS NEEDED
Status: DISCONTINUED | OUTPATIENT
Start: 2024-01-08 | End: 2024-01-08

## 2024-01-08 RX ORDER — ACETAMINOPHEN 325 MG/1
975 TABLET ORAL ONCE
Status: COMPLETED | OUTPATIENT
Start: 2024-01-08 | End: 2024-01-08

## 2024-01-08 RX ADMIN — FENTANYL CITRATE 25 MCG: 50 INJECTION INTRAMUSCULAR; INTRAVENOUS at 08:14

## 2024-01-08 RX ADMIN — PROPOFOL 50 MG: 10 INJECTION, EMULSION INTRAVENOUS at 08:14

## 2024-01-08 RX ADMIN — LIDOCAINE HYDROCHLORIDE 40 MG: 20 INJECTION, SOLUTION EPIDURAL; INFILTRATION; INTRACAUDAL at 08:02

## 2024-01-08 RX ADMIN — FENTANYL CITRATE 25 MCG: 50 INJECTION INTRAMUSCULAR; INTRAVENOUS at 08:19

## 2024-01-08 RX ADMIN — Medication 10 MG: at 08:31

## 2024-01-08 RX ADMIN — Medication 10 MG: at 08:49

## 2024-01-08 RX ADMIN — Medication 10 MG: at 08:19

## 2024-01-08 RX ADMIN — FENTANYL CITRATE 50 MCG: 50 INJECTION INTRAMUSCULAR; INTRAVENOUS at 08:00

## 2024-01-08 RX ADMIN — FENTANYL CITRATE 50 MCG: 50 INJECTION INTRAMUSCULAR; INTRAVENOUS at 07:54

## 2024-01-08 RX ADMIN — PROPOFOL 50 MG: 10 INJECTION, EMULSION INTRAVENOUS at 08:56

## 2024-01-08 RX ADMIN — ACETAMINOPHEN 325MG 975 MG: 325 TABLET ORAL at 06:57

## 2024-01-08 RX ADMIN — Medication 10 MG: at 08:03

## 2024-01-08 RX ADMIN — ONDANSETRON 4 MG: 2 INJECTION INTRAMUSCULAR; INTRAVENOUS at 08:09

## 2024-01-08 RX ADMIN — PROPOFOL 50 MG: 10 INJECTION, EMULSION INTRAVENOUS at 08:02

## 2024-01-08 RX ADMIN — PROPOFOL 100 MCG/KG/MIN: 10 INJECTION, EMULSION INTRAVENOUS at 08:02

## 2024-01-08 RX ADMIN — FENTANYL CITRATE 50 MCG: 50 INJECTION INTRAMUSCULAR; INTRAVENOUS at 08:25

## 2024-01-08 RX ADMIN — Medication 10 MG: at 09:01

## 2024-01-08 RX ADMIN — IBUPROFEN 600 MG: 600 TABLET, FILM COATED ORAL at 12:47

## 2024-01-08 RX ADMIN — SODIUM CHLORIDE 125 ML/HR: 0.9 INJECTION, SOLUTION INTRAVENOUS at 07:06

## 2024-01-08 RX ADMIN — KETOROLAC TROMETHAMINE 30 MG: 30 INJECTION, SOLUTION INTRAMUSCULAR at 09:39

## 2024-01-08 RX ADMIN — CEFAZOLIN SODIUM 2000 MG: 2 SOLUTION INTRAVENOUS at 08:01

## 2024-01-08 RX ADMIN — FUROSEMIDE 10 MG: 10 INJECTION, SOLUTION INTRAVENOUS at 09:05

## 2024-01-08 RX ADMIN — DEXAMETHASONE SODIUM PHOSPHATE 10 MG: 10 INJECTION INTRAMUSCULAR; INTRAVENOUS at 08:09

## 2024-01-08 RX ADMIN — SODIUM CHLORIDE 125 ML/HR: 0.9 INJECTION, SOLUTION INTRAVENOUS at 10:37

## 2024-01-08 NOTE — OP NOTE
OPERATIVE REPORT  PATIENT NAME: Bonita Candelario    :  1955  MRN: [id number]  Pt Location: [facility] OR ROOM 04    SURGERY DATE: 2024    Surgeons and Role:     * Akash Diaz MD - Primary     * Norma Van MD - Second Assistant     * Harshal Clark MD - First Assistant - role during surgery: retraction, suctioning irrigating, cleansing field with sponge, suturing, cutting sutures, adjusting light, following primary surgeon with suture handling      Preop Diagnosis:  Incomplete uterovaginal prolapse [N81.2]  Cystocele, midline [N81.11]  Rectocele [N81.6]  Pelvic muscle wasting [N81.84]  Other female genital prolapse [N81.89]  Stress incontinence (female) (male) [N39.3]  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]  Postmenopausal bleeding [N95.0]    Post-Op Diagnosis Codes:     * Incomplete uterovaginal prolapse [N81.2]     * Cystocele, midline [N81.11]     * Rectocele [N81.6]     * Pelvic muscle wasting [N81.84]     * Other female genital prolapse [N81.89]     * Stress incontinence (female) (male) [N39.3]     * 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]     * Postmenopausal bleeding [N95.0]    Procedure(s):  COLPOSUSPENSION VAGINAL EXTRAPERITONEAL(ENPLACE)  A&P COLPORRHAPHY  PV SLING  CYSTO  (D&C)  HYSTEROSCOPY    Specimen(s):  ID Type Source Tests Collected by Time Destination   1 : Endometrial Curreting and Polyp fragments Tissue Endometrium TISSUE EXAM Akash Diaz MD 2024 0821        Estimated Blood Loss:   Minimal    Drains:  * No LDAs found *    Anesthesia Type:   IV Sedation with Anesthesia    Operative Indications:  Incomplete uterovaginal prolapse [N81.2]  Cystocele, midline [N81.11]  Rectocele [N81.6]  Pelvic muscle wasting [N81.84]  Other female genital prolapse [N81.89]  Stress incontinence (female) (male) [N39.3]  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 Relaxed vaginal outlet. Incomplete uterovaginal prolapse with C at  0. Persistent cystocele and rectocele. [place] on the right placed at the 70 yard line with anchor on the left at the 30 yard line. Hysteroscopic evaluation with sessile polyp on the left fundal aspect of the endometrial cavity. Cystoscopy with mild to moderate bladder wall trabeculations. No mesh or suture exposure through the bladder lumen. Efflux visualized from bilateral ureteral orifices.     Complications:   None    Procedure and Technique:    Appropriate preoperative antibiotics chosen per ACOG guidelines were given.  Bilateral SCDs were placed in the lower extremities for DVT prevention prior to the institution of anesthesia.    The patient was identified in the holding area by the operating room staff and attending physician. She was taken to the operating room where anesthesia was instituted without complications. She was placed in the dorsal lithotomy position with the legs in Naveen stirrups with care taken to avoid excessive flexion or extension of her lower extremities. The patient was prepped and draped in the usual sterile fashion. A Driver catheter was inserted.    The cervix was visualized at the vaginal outlet. The anterior lip of the cervix was grasped with a tenaculum. The Uterus was sound to 7cm. The cervical os was serially dilated to accommodate a diagnostic hysteroscope. The hysteroscope was then inserted using NS for distension. The endometrial cavity findings as above. A sharp curettage was then performed with removal of polyp that had been identified.     A finger was then placed in the lateral vagina, with careful palpation of the ischial spines and sacrospinous ligaments bilaterally.  The right finger sleeve was then applied to the surgeon's index finger.  These landmarks were then again palpated with the finger sleeve in place.  Location along the sacrospinous ligament approximately in the middle 1/3 of the ligament as well as the lower 1/3 of the ligament was carefully palpated, and the  trocar device loaded with the 0 Prolene suture was brought into the field. The trocar device was inserted into the finger sleeve port and the device was used to place the 0 Prolene suture at this location while the surgeons finger was firmly pressed against the ligament, approximately 2 cm medial to the ischial spine using the sacrum as a guide.  Once the suture was anchored in place, which was confirmed by tenting of the ligament with gentle tugging, all instruments were removed from the vagina.  Rectal exam confirmed proper location as well. She had the exact same procedure performed on the contralateral side. Next local was injected into the cervico-vaginal junction and a 3 cm anterior vaginal epithelial incision was made along the anterior cervico-vaginal junction, until the cervical stroma was encountered. Next, one strand of the Prolene suture was passed backwards using a large hylton needle through its entering point in the vaginal wall and medially through the cervical stromal tissue. The second strand on the same side was passed caudally and proximal to the cervical os using the same technique. The exact same procedure was performed on the contralateral side.     Attention was turned to the anterior wall where a persistent cystocele was noted. Two Allis clamps were applied horizontally along the vaginal incision to grasp the dependent portion of the cystocele for traction. The epithelium was further  from the vaginal muscularis sharply with tenotomy and metzenbaum scissors and bluntly with peanut sponges and a raytec. Excess vaginal mucosal skin was trimmed. The vaginal wall was then plicated in a vertical imbricating fashion using 2-0 PDS. We then began closure of the anterior vaginal epithelial incision with a 2-0 Vicryl sutures in a running locked stitch from lateral to medial with two separate sutures from each vaginal apex. The incision was intentionally left open after the first few throws from  each side. The [employer] prolene sutures were then tied down separately, one at a time until proper apical support was obtained and then they were tied together pushing the prolapse up to the anatomic position of the vaginal apex. The 2-0 PDS stitches were then tied down. We then finished closure of the anterior vaginal epithelial incision with the two 2-0 Vicryl sutures in a running locked stitch from lateral to medial. The incision was inspected and noted to be hemostatic.      We turned our attention for performance of the single incision sling. At this time, the mid urethral zone was identified in reference to the Driver catheter and urethral meatus and local anesthetic solution was injected into the anterior vaginal wall at the mid urethral level for hydrodissection and vasoconstriction. Next, local was injected at the midline and to the left and right of midline directing the infiltration laterally towards the cephalad aspect of the inferior pubic ramus bilaterally. Care was taken to ensure that the sulcus was flattened and free of infiltration to minimize chance for buttonholing or tapping too close to the anterolateral sulcus. After completion of hydrodissection, 2 Allis clamps were used to grasp the anterior vaginal wall for traction. A 1.5 cm incision was made to the midline. Two Allis clamps were then placed on each cut edge of the incision for stabilization. Tenotomy scissors were used to create a small vaginal tunnel with sharp and blunt dissection above the anterior vaginal wall directed laterally towards the cephalad aspect of the inferior pubic ramus bilaterally. Dissection was carried out to the edge of the bone itself but no dissection into the obturator internus muscle. Once the dissection was complete, the sling was placed. The Altis system was used. An index finger was placed in the vagina for guidance. The Altis trocar was placed into the pre-dissected tract. The handle was held in horizontal  slight upward canting to avoid buttonholing of the sulcus. Cephalad drift was used to allow passage around the inferior pubic ramus. A thumb was placed on the heel of the introducer to allow a push/pivot maneuver to place a fixed anchor into the obturator internus muscle membrane complex on the patient's left side. Proper handle deviation confirmed proper anchor placement, as well as a gentle tugging on the sling. Once the introducer was removed, it also confirmed proper anchor placement. The exact same sequence of steps was repeated on the patient's right side with adjustable anchor. Once it was complete, the introducer was removed and gentle tugging again confirmed proper anchor placement. The Driver catheter was then used to drain the bladder and then it was removed and a diagnostic cystoscopy was performed by instilling 300 mL of fluid into the bladder. Both ureters were effluxing normally and there were no lacerations or injury in the lower urinary tract. We used Crede maneuver to elicit loss of fluid from the urethral meatus and there was loss of fluid noted. The tensioning suture was used to adjust the tape until there was minimal to no leakage with Crede. After appropriate tensioning, the tensioning suture was cut and the vaginal incision was closed with 2-0 Vicryl suture in a running locked stitch. At this time, we placed the Driver back in the bladder.     Attention was then turned to the posterior compartment where 2 Allis clamps were placed in the posterior fourchette over the mucocutaneous border to reduce the markedly relaxed vaginal outlet. Dilute Marcaine solution was injected into the perineum. A raymon-shaped incision was made extending from the vaginal mucosa onto the perineum. The overlying skin was removed en bloc. We then began closure of the posterior colporrhaphy with a 2-0 Vicryl suture in a running locked stitch. We reapproximated the perineal body with a 0-Vicryl interrupted suture. We  closed the remainder of the colporrhaphy to the level of the hymen. We closed the perineal skin with 2-0 Vicryl in a subcuticular fashion.    No bladder, ureteral, viscus, or solid organ injury were noted at the end of the procedure. Irrigation was performed. We completed the procedure. Vaginal packing was placed in the vagina. There were no complications.The sponge, needle and instrument count were correct x2. The patient tolerated the procedure well and went to the recovery room in stable condition and she is stable at the moment of this dictation.    No qualified resident was available to assist with procedure. Dr. Diaz was present for the entire procedure.    I was present for the entire procedure.    Patient Disposition:  PACU         SIGNATURE: Harshal Clark MD  DATE: January 8, 2024  TIME: 9:57 AM

## 2024-01-08 NOTE — ANESTHESIA POSTPROCEDURE EVALUATION
Post-Op Assessment Note    CV Status:  Stable  Pain Score: 1    Pain management: adequate       Mental Status:  Alert and awake   Hydration Status:  Euvolemic   PONV Controlled:  Controlled   Airway Patency:  Patent     Post Op Vitals Reviewed: Yes      Staff: Anesthesiologist               /62 (01/08/24 1012)    Temp      Pulse 64 (01/08/24 1012)   Resp 14 (01/08/24 1012)    SpO2 98 % (01/08/24 1012)

## 2024-01-09 PROBLEM — H61.21 IMPACTED CERUMEN OF RIGHT EAR: Status: RESOLVED | Noted: 2023-05-10 | Resolved: 2024-01-09

## 2024-01-11 PROCEDURE — 88305 TISSUE EXAM BY PATHOLOGIST: CPT | Performed by: PATHOLOGY

## 2024-02-21 PROBLEM — N39.0 RECURRENT UTI: Status: RESOLVED | Noted: 2019-07-08 | Resolved: 2024-02-21

## 2024-03-06 ENCOUNTER — APPOINTMENT (OUTPATIENT)
Dept: RADIOLOGY | Facility: MEDICAL CENTER | Age: 69
End: 2024-03-06
Payer: COMMERCIAL

## 2024-03-06 ENCOUNTER — OFFICE VISIT (OUTPATIENT)
Dept: FAMILY MEDICINE CLINIC | Facility: CLINIC | Age: 69
End: 2024-03-06
Payer: COMMERCIAL

## 2024-03-06 VITALS
SYSTOLIC BLOOD PRESSURE: 110 MMHG | DIASTOLIC BLOOD PRESSURE: 70 MMHG | RESPIRATION RATE: 16 BRPM | WEIGHT: 134 LBS | HEIGHT: 62 IN | HEART RATE: 63 BPM | OXYGEN SATURATION: 98 % | TEMPERATURE: 98.7 F | BODY MASS INDEX: 24.66 KG/M2

## 2024-03-06 DIAGNOSIS — M54.9 MID BACK PAIN: ICD-10-CM

## 2024-03-06 DIAGNOSIS — M54.9 MID BACK PAIN: Primary | ICD-10-CM

## 2024-03-06 DIAGNOSIS — M54.2 NECK PAIN: ICD-10-CM

## 2024-03-06 PROCEDURE — 72072 X-RAY EXAM THORAC SPINE 3VWS: CPT

## 2024-03-06 PROCEDURE — 99214 OFFICE O/P EST MOD 30 MIN: CPT | Performed by: FAMILY MEDICINE

## 2024-03-06 PROCEDURE — 72050 X-RAY EXAM NECK SPINE 4/5VWS: CPT

## 2024-03-06 PROCEDURE — G2211 COMPLEX E/M VISIT ADD ON: HCPCS | Performed by: FAMILY MEDICINE

## 2024-03-06 NOTE — PROGRESS NOTES
FAMILY PRACTICE OFFICE VISIT    NAME: Bonita Candelario    AGE: 68 y.o.   SEX: female  : 1955   MRN: [id number]    DATE: 3/6/2024  TIME: 2:56 PM    Assessment and Plan   1. Mid back pain  See below  Avoid heavy lifting  No red flags on exam or by history    - XR spine thoracic 3 vw; Future    2. Neck pain  See below  - XR spine cervical complete 4 or 5 vw non injury; Future        Hold off on aggressive manipulation with chiropractor until imaging reviewed    Dexa 2023 -   Osteopenia  Taking calcium and vit D    Patient Instructions   Recommend imaging of neck and mid back - xrays  Pending results - suspect will proceed with PT          Chief Complaint     Chief Complaint   Patient presents with    Back Pain     X 4m with left foot numbness x 2m. No pain standing a lot she has pain she states more aches and stiffness.        History of Present Illness   Bonita Candelario is a 68 y.o.-year-old female who presents today for discussion re: ongoing back pain - has a stiff area in mid back - thoracic region    C/o left foot numbness  No injury      Review of Systems   Review of Systems   Constitutional:  Negative for fever.   Respiratory:  Negative for cough, shortness of breath and wheezing.    Cardiovascular:  Negative for chest pain and palpitations.   Gastrointestinal:         No loss of bowel or bladder.     Musculoskeletal:  Positive for arthralgias and back pain. Negative for gait problem.        Has multiple joint aches and pains  Neck and shoulders, hips  Had PT rx in past but did not go  Was caring for her mother.  Goes to a chiropractor.  Feels limited mobility in mid thoracic spine  Worse when standing     Neurological:         C/o numbness in left foot  But nothing going down the leg.  Pain in back does not wake pt at night          Active Problem List     Patient Active Problem List   Diagnosis    Chronic constipation    Hypercholesterolemia    Acquired hypothyroidism    Vitamin D deficiency    Chronic  neck pain    Hip pain    Osteopenia of left hip    Pelvic floor dysfunction    Atrophic vaginitis    Hyperglycemia    Esophageal dysphagia    Chronic pain of left ankle    Gross hematuria    Tendinitis of right rotator cuff    Chronic pain of right wrist    PONV (postoperative nausea and vomiting)    Incomplete uterovaginal prolapse    Stress incontinence (female) (male)    Postmenopausal bleeding    Rectocele    Cystocele, midline    Hypermobility of urethra    Other female genital prolapse    Pelvic muscle wasting         Past Medical History:  Past Medical History:   Diagnosis Date    Anxiety     last assessed 12/1/14, per pt resolved 12/27/23    Closed displaced fracture of proximal phalanx of lesser toe of right foot 09/11/2020    Disease of thyroid gland     Hypothyroidism    Headache syndrome 05/12/2017    Hematuria     last assessed 4/23/15, resolved 2/1/16    Left breast mass 12/18/2019     Questionable mass on exam 2019 May be patch of more cystic breast tissue, not well defined. Will check dx mammo  Mammogram was normal 9/20    PONV (postoperative nausea and vomiting)     Skin neoplasm     last assessed 9/18/15, resolved 2/1/16    Uterine polyp     Uterine prolapse     pelvic repair w/ PVS  today  1/8/2024       Past Surgical History:  Past Surgical History:   Procedure Laterality Date    COLONOSCOPY      DILATION AND CURETTAGE OF UTERUS N/A 1/8/2024    Procedure: (D&C);  Surgeon: Akash Diaz MD;  [facility];  Service: UroGynecology           HYSTEROSCOPY WITH  RESECTION UTERINE TUMOR/FIBROID  03/2020    CO CMBND ANTERPOST COLPORRAPHY W/CYSTO N/A 1/8/2024    Procedure: A&P COLPORRHAPHY;  Surgeon: Akash Diaz MD;  [facility];  Service: UroGynecology           CO COLPOPEXY VAGINAL EXTRAPERITONEAL APPROACH N/A 1/8/2024    Procedure: COLPOSUSPENSION VAGINAL EXTRAPERITONEAL(ENPLACE);  Surgeon: Akash Diaz MD;  [facility];  Service: UroGynecology           CO  CYSTOURETHROSCOPY N/A 2024    Procedure: CYSTO;  Surgeon: Akash Diaz MD;  [facility];  Service: UroGynecology           LA HYSTEROSCOPY DIAGNOSTIC SEPARATE PROCEDURE N/A 2024    Procedure: HYSTEROSCOPY;  Surgeon: Akash Diaz MD;  [facility];  Service: UroGynecology           LA SLING OPERATION STRESS INCONTINENCE N/A 2024    Procedure: PV SLING;  Surgeon: Akash Diaz MD;  [facility];  Service: UroGynecology           SHOULDER SURGERY Left 2009    tear repair    TOOTH EXTRACTION         Family History:  Family History   Problem Relation Age of Onset    Hyperlipidemia Mother     Cancer Father         of spine    Stroke Father         syndrome     No Known Problems Sister     Breast cancer Maternal Aunt 65    No Known Problems Maternal Aunt     Anesthesia problems Neg Hx        Social History:  Social History     Socioeconomic History    Marital status:      Spouse name: Not on file    Number of children: Not on file    Years of education: Not on file    Highest education level: Not on file   Occupational History    Not on file   Tobacco Use    Smoking status: Former     Current packs/day: 0.00     Types: Cigarettes     Quit date: 1990     Years since quittin.2    Smokeless tobacco: Never    Tobacco comments:     Former cig use for approx 20 yrs - quit 25 yrs ago   Vaping Use    Vaping status: Never Used   Substance and Sexual Activity    Alcohol use: Yes     Alcohol/week: 1.0 standard drink of alcohol     Types: 1 Glasses of wine per week     Comment: 1 x daily    Drug use: No     Comment: Denies any drug use per pt    Sexual activity: Yes     Partners: Male     Comment: Denies any chest pain or shortness of breath with activity   Other Topics Concern    Not on file   Social History Narrative    Not on file     Social Determinants of Health     Financial Resource Strain: Low Risk  (5/10/2023)    Overall Financial Resource Strain ([facility])      Difficulty of Paying Living Expenses: Not hard at all   Food Insecurity: Not on file   Transportation Needs: No Transportation Needs (5/10/2023)    PRAPARE - Transportation     Lack of Transportation (Medical): No     Lack of Transportation (Non-Medical): No   Physical Activity: Not on file   Stress: Not on file   Social Connections: Not on file   Intimate Partner Violence: Not on file   Housing Stability: Not on file       Objective     Vitals:    03/06/24 1451   BP: 110/70   Pulse: 63   Resp: 16   Temp: 98.7 °F (37.1 °C)   SpO2: 98%     Wt Readings from Last 3 Encounters:   03/06/24 60.8 kg (134 lb)   01/08/24 60.9 kg (134 lb 4.2 oz)   11/10/23 57.2 kg (126 lb)       Physical Exam  Vitals and nursing note reviewed.   Constitutional:       General: She is not in acute distress.     Appearance: Normal appearance. She is not ill-appearing or toxic-appearing.   Cardiovascular:      Rate and Rhythm: Normal rate and regular rhythm.      Pulses: Normal pulses.      Heart sounds: Normal heart sounds. No murmur heard.  Pulmonary:      Effort: Pulmonary effort is normal. No respiratory distress.      Breath sounds: Normal breath sounds. No wheezing, rhonchi or rales.   Musculoskeletal:      Right lower leg: No edema.      Left lower leg: No edema.      Comments: around T7/8 - focal point tenderness   Skin:     General: Skin is warm.      Coloration: Skin is not jaundiced.   Neurological:      General: No focal deficit present.      Mental Status: She is alert and oriented to person, place, and time.      Sensory: No sensory deficit.      Motor: No weakness.      Deep Tendon Reflexes: Reflexes normal.      Comments: Motor - equal and normal b/l lower ext's (+) 5/5  Sensory b/l lower ext's intact  (-) straight leg raise.     Psychiatric:         Mood and Affect: Mood normal.         Behavior: Behavior normal.         Thought Content: Thought content normal.         Judgment: Judgment normal.         Pertinent  "Laboratory/Diagnostic Studies:  Lab Results   Component Value Date    GLUCOSE 98 12/03/2015    BUN 15 11/06/2023    CREATININE 0.54 (L) 11/06/2023    CALCIUM 9.6 11/06/2023     12/03/2015    K 4.2 11/06/2023    CO2 30 11/06/2023     11/06/2023     Lab Results   Component Value Date    ALT 17 11/06/2023    AST 17 11/06/2023    ALKPHOS 44 11/06/2023    BILITOT 0.77 12/03/2015       Lab Results   Component Value Date    WBC 4.57 11/06/2023    HGB 12.9 11/06/2023    HCT 38.6 11/06/2023    MCV 97 11/06/2023     11/06/2023       No results found for: \"TSH\"    Lab Results   Component Value Date    CHOL 227 12/03/2015     Lab Results   Component Value Date    TRIG 100 11/06/2023     Lab Results   Component Value Date    HDL 64 11/06/2023     Lab Results   Component Value Date    LDLCALC 134 (H) 11/06/2023     Lab Results   Component Value Date    HGBA1C 5.6 11/06/2023       Results for orders placed or performed during the hospital encounter of 01/08/24   Tissue Exam   Result Value Ref Range    Case Report       Surgical Pathology Report                         Case: [id number]                                  Authorizing Provider:  Akash Diaz MD        Collected:           01/08/2024 0821              Ordering Location:     [facility]        Received:            01/08/2024 [address] [place] [address] Operating Room                                                     Pathologist:           Ramos Payan MD                                                                Specimen:    Endometrium, Endometrial Curetting and Polyp fragments                                     Final Diagnosis       A.  Endometrium and polyp fragments (curetting):     - Endometrial polyp; negative for atypia and malignancy.      Additional Information       All reported additional testing was performed with appropriately reactive controls.  These tests were developed and their performance " "characteristics determined by [facility] or appropriate performing facility, though some tests may be performed on tissues which have not been validated for performance characteristics (such as staining performed on alcohol exposed cell blocks and decalcified tissues).  Results should be interpreted with caution and in the context of the patients’ clinical condition. These tests may not be cleared or approved by the U.S. Food and Drug Administration, though the FDA has determined that such clearance or approval is not necessary. These tests are used for clinical purposes and they should not be regarded as investigational or for research. This laboratory has been approved by CLIA 88, designated as a high-complexity laboratory and is qualified to perform these tests. Interpretation performed at [facility], [address] [place] [address]..      Gross Description       A. The specimen is received in formalin, labeled with the patient's name and hospital number, and is designated \" endometrial curettings and polyp fragments\".  The specimen consists of 1 tan-pink tissue fragment measuring 0.6 x 0.4 x 0.1 cm.  Entirely submitted. One cassette.  In embedding bag.    Note: The estimated total formalin fixation time based upon information provided by the submitting clinician and the standard processing schedule is under 72 hours.      Mary         No orders of the defined types were placed in this encounter.      ALLERGIES:  Allergies   Allergen Reactions    Sulfa Antibiotics Rash       Current Medications     Current Outpatient Medications   Medication Sig Dispense Refill    Calcium Carbonate-Vitamin D (CALCIUM-D PO) Take by mouth      Cholecalciferol (Vitamin D3) 50 MCG (2000 UT) capsule 2 tabs daily (Patient taking differently: Take 2,000 Units by mouth daily 1 tabs daily (2000IU))  0    estrogens, conjugated (Premarin) vaginal cream Insert 0.5 g into the vagina 3 (three) times a week   "    ibuprofen (MOTRIN) 200 mg tablet Take 200 mg by mouth every 6 (six) hours as needed 12/27/23 Per office - pt instructed to hold 7 days prior to surgery      levothyroxine 75 mcg tablet Take 1 tablet (75 mcg total) by mouth daily 90 tablet 3    meloxicam (MOBIC) 15 mg tablet TAKE 1 TABLET (15 MG TOTAL) BY MOUTH DAILY. (Patient taking differently: Take 15 mg by mouth if needed) 30 tablet 1    Multiple Vitamin (multivitamin) tablet Take 1 tablet by mouth daily       No current facility-administered medications for this visit.         Health Maintenance     Health Maintenance   Topic Date Due    Zoster Vaccine (1 of 2) Never done    PT PLAN OF CARE  04/03/2022    COVID-19 Vaccine (7 - 2023-24 season) 12/15/2023    Fall Risk  05/10/2024    Medicare Annual Wellness Visit (AWV)  05/10/2024    Urinary Incontinence Screening  05/10/2024    Breast Cancer Screening: Mammogram  07/06/2024    DXA SCAN  02/02/2025    Depression Screening  03/06/2025    Cervical Cancer Screening  02/17/2026    Colorectal Cancer Screening  05/06/2026    Hepatitis C Screening  Completed    Osteoporosis Screening  Completed    Pneumococcal Vaccine: 65+ Years  Completed    Influenza Vaccine  Completed    HIB Vaccine  Aged Out    IPV Vaccine  Aged Out    Hepatitis A Vaccine  Aged Out    Meningococcal ACWY Vaccine  Aged Out    HPV Vaccine  Aged Out     Immunization History   Administered Date(s) Administered    COVID-19 MODERNA VACC 0.5 ML IM 10/29/2021, 04/08/2022    COVID-19 Moderna Vac BIVALENT 12 Yr+ IM 0.5 ML 10/24/2022    COVID-19 PFIZER VACCINE 0.3 ML IM 03/15/2021, 04/05/2021    COVID-19 Pfizer mRNA vacc PF sebastian-sucrose 12 yr and older (Comirnaty) 10/20/2023    INFLUENZA 12/07/2010, 09/11/2015, 11/02/2017, 09/28/2020, 11/10/2022    Influenza Quadrivalent Preservative Free 3 years and older IM 11/02/2017    Influenza Quadrivalent, 6-35 Months IM 09/11/2015, 09/22/2016    Influenza, high dose seasonal 0.7 mL 11/10/2022, 09/18/2023    Influenza,  recombinant, quadrivalent,injectable, preservative free 10/24/2018, 10/22/2019, 09/30/2020, 11/19/2021    Pneumococcal Conjugate Vaccine 20-valent (Pcv20), Polysace 05/10/2023    Tdap 02/01/2016          Rhonda Bain DO

## 2024-03-07 NOTE — RESULT ENCOUNTER NOTE
Good morning  Xray of c spine - shows multi-level arthritic changes.  Xray of thoracic spine shows the same as well as mild scoliosis.  I would recommend proceeding with physical therapy  I have placed a referral in chart  Please let me know if you have any additional questions/concerns  Enjoy the sunshine!  Dr cabrera.

## 2024-04-05 ENCOUNTER — EVALUATION (OUTPATIENT)
Dept: PHYSICAL THERAPY | Facility: MEDICAL CENTER | Age: 69
End: 2024-04-05
Payer: COMMERCIAL

## 2024-04-05 DIAGNOSIS — M54.2 NECK PAIN: ICD-10-CM

## 2024-04-05 DIAGNOSIS — M54.9 MID BACK PAIN: ICD-10-CM

## 2024-04-05 PROCEDURE — 97140 MANUAL THERAPY 1/> REGIONS: CPT | Performed by: PHYSICAL THERAPIST

## 2024-04-05 PROCEDURE — 97161 PT EVAL LOW COMPLEX 20 MIN: CPT | Performed by: PHYSICAL THERAPIST

## 2024-04-05 NOTE — PROGRESS NOTES
PT Evaluation     Today's date: 2024  Patient name: Bonita Candelario  : 1955  MRN: [id number]  Referring provider: Rhonda Bain DO  Dx:   Encounter Diagnosis     ICD-10-CM    1. Mid back pain  M54.9 Ambulatory Referral to Physical Therapy      2. Neck pain  M54.2 Ambulatory Referral to Physical Therapy                     Assessment  Assessment details: Bonita Candelario is a 68 y.o. female was evaluated on 2024  for Mid back pain  Neck pain. Bonita Candelario has the above listed impairments resulting in functional deficits and negative impact to quality of life.  Patient is appropriate for skilled PT intervention to promote maximal return to function and patient specific goals.      Patient agrees with outlined treatment plan and all questions were answered to their satisfaction.      Impairments: abnormal muscle firing, abnormal muscle tone, abnormal or restricted ROM, impaired physical strength, lacks appropriate home exercise program and pain with function  Understanding of Dx/Px/POC: good   Prognosis: good    Goals  Patient will successfully transition to home exercise program.  Patient will be able to manage symptoms independently.    Bonita will report no limitation in looking upward  Bonita will report no limitation in drinking out of glass  Bonita will report no limitation tying shoes     Plan  Patient would benefit from: skilled PT  Referral necessary: No  Planned modality interventions: thermotherapy: hydrocollator packs  Planned therapy interventions: home exercise program, manual therapy, neuromuscular re-education, patient education, functional ROM exercises, strengthening, stretching, joint mobilization, graded activity, graded exercise, therapeutic exercise, body mechanics training, motor coordination training and activity modification  Frequency: 2x week  Duration in weeks: 12  Treatment plan discussed with: patient        Subjective Evaluation    History of Present Illness  Mechanism of  injury: Bonita Candelario is a 68 y.o. female presenting to therapy with complaints of neck and thoracic pain.  She notes long history of stiffness in her back, difficulty looking up and turning right in particular.  .    Date of onset:  Many years ago   Symptom AGGS:  Looking upward, bending backward, turning head to R   Symptom EASES: Chiropractic treatments, stretching   Prior Treatment:  Chiropractic  Relevant PMH:  None   Prior Imaging: Moderate OA     Occupation:  Retired   Activity goals: To be able to look upward without pain, to be able to rotate head fully      She also reports hip tightness, difficulty tying shoes           Patient Goals  Patient goals for therapy: decreased pain, increased motion, return to sport/leisure activities, independence with ADLs/IADLs and increased strength    Pain  Current pain ratin  At best pain ratin  At worst pain ratin  Quality: dull ache, discomfort, sharp and tight          Objective     Concurrent Complaints  Negative for night pain, disturbed sleep, dizziness, faints, headaches and nausea/motion sickness    Postural Observations  Seated posture: fair  Standing posture: fair      Neurological Testing     Sensation   Cervical/Thoracic   Left   Intact: light touch    Right   Intact: light touch    Reflexes   Left   Biceps (C5/C6): normal (2+)  Triceps (C7): normal (2+)    Right   Biceps (C5/C6): normal (2+)  Triceps (C7): normal (2+)    Active Range of Motion   Cervical/Thoracic Spine       Cervical    Flexion:  WFL  Extension:  Restriction level: maximal  Left lateral flexion:  Restriction level: minimal  Right lateral flexion:  Restriction level moderate  Left rotation:  Restriction level: minimal  Right rotation:  Restriction level: moderate    Thoracic    Extension:  Restriction level: moderate  Left rotation:  Restriction level: moderate  Right rotation:  Restriction level: moderate    Passive Range of Motion   Left Hip   Normal passive range of  motion  External rotation (90/90): 40 degrees     Right Hip   Normal passive range of motion  External rotation (90/90): 40 degrees     Joint Play     Comments: Hypomobile throughout thoracic and lumbar spine     Strength/Myotome Testing   Cervical Spine     Left   Normal strength    Right   Normal strength    General Comments:    Upper quarter screen   Shoulder: unremarkable  Neuro Exam:     Headaches   Patient reports headaches: No.     Red Flag Screening:  History Cancer (-)  Bowl/Bladder dysfunction (-)  Night pain (-)  Unexplained weight loss (-)     Dermatomes:  WNL   Myotomes:  WNL    Reflexes:   Patellar R 2+, L 2+  Achilles R 2+, L 2+      Lumbar:  AROM:   Flexion WFL  Extension Moderate restriction, lack of lordosis   Side bending Min limitatinon  Rotation WFL    Repeated Motion Testing: Unremarkable    Active Motion Observations: Unremarkable       PAIVM: Hypomobility throughout     Special Tests:   Crossed SLR (-), SLR (-) Prone instability (-)   Neural Dynamic Testing: Tibial Bias (-), Peroneal Bias (-)   Slump Testing (-) with and without axial compression       Hip   Hip ER hypomobility bilaterally 35 deg rotation   Special Tests: GABO (-), FADIR (-)                         Precautions: None      Manuals 4/5                                                                Neuro Re-Ed                                                                                                        Ther Ex                                                                                                                     Ther Activity                                       Gait Training                                       Modalities

## 2024-04-08 ENCOUNTER — OFFICE VISIT (OUTPATIENT)
Dept: PHYSICAL THERAPY | Facility: MEDICAL CENTER | Age: 69
End: 2024-04-08
Payer: COMMERCIAL

## 2024-04-08 DIAGNOSIS — M54.9 MID BACK PAIN: Primary | ICD-10-CM

## 2024-04-08 DIAGNOSIS — M54.2 NECK PAIN: ICD-10-CM

## 2024-04-08 PROCEDURE — 97140 MANUAL THERAPY 1/> REGIONS: CPT | Performed by: PHYSICAL THERAPIST

## 2024-04-08 PROCEDURE — 97110 THERAPEUTIC EXERCISES: CPT | Performed by: PHYSICAL THERAPIST

## 2024-04-08 NOTE — PROGRESS NOTES
Daily Note     Today's date: 2024  Patient name: Bonita Candelario  : 1955  MRN: [id number]  Referring provider: Rhonda Bain DO  Dx:   Encounter Diagnosis     ICD-10-CM    1. Mid back pain  M54.9       2. Neck pain  M54.2                      Subjective:  Bonita reports doing well, no issues after evaluation       Objective: See treatment diary below      Assessment: Tolerated treatment well. Patient  continues to benefit from mobilizations and mobility exercises focusing on thoracic extension      Plan: Continue per plan of care.      Precautions: None      Manuals             Prone PA thoracic AF            Prone 1st rib inferior glide R  AF            Supine mid thoracic HVLAT AF                         Neuro Re-Ed                                                                                                        Ther Ex             Thoracic extension 20 X 3 sec            Self inferior 1st rib glide 20 with SB and ROT            Seated hip ER stretch  At rail  30 sec  X 3                                                                                Ther Activity                                       Gait Training                                       Modalities

## 2024-04-10 ENCOUNTER — OFFICE VISIT (OUTPATIENT)
Dept: PHYSICAL THERAPY | Facility: MEDICAL CENTER | Age: 69
End: 2024-04-10
Payer: COMMERCIAL

## 2024-04-10 DIAGNOSIS — M54.2 NECK PAIN: ICD-10-CM

## 2024-04-10 DIAGNOSIS — M54.9 MID BACK PAIN: Primary | ICD-10-CM

## 2024-04-10 PROCEDURE — 97140 MANUAL THERAPY 1/> REGIONS: CPT | Performed by: PHYSICAL THERAPIST

## 2024-04-10 PROCEDURE — 97110 THERAPEUTIC EXERCISES: CPT | Performed by: PHYSICAL THERAPIST

## 2024-04-10 PROCEDURE — 97112 NEUROMUSCULAR REEDUCATION: CPT | Performed by: PHYSICAL THERAPIST

## 2024-04-10 NOTE — PROGRESS NOTES
Daily Note     Today's date: 4/10/2024  Patient name: Bonita Candelario  : 1955  MRN: [id number]  Referring provider: Rhonda Bain DO  Dx:   Encounter Diagnosis     ICD-10-CM    1. Mid back pain  M54.9       2. Neck pain  M54.2                      Subjective:  Bonita reports doing well, no issues after evaluation       Objective: See treatment diary below      Assessment: Tolerated treatment well. Patient  continues to benefit from mobilizations and mobility exercises focusing on thoracic extension  began further scapular strengthening today      Plan: Continue per plan of care.      Precautions: None      Manuals 4/10            Prone PA thoracic AF            Prone 1st rib inferior glide R  AF            Supine mid thoracic HVLAT AF                         Neuro Re-Ed                                                                                                        Ther Ex             Thoracic extension 20 X 3 sec            Self inferior 1st rib glide 20 with SB and ROT            Seated hip ER stretch  At rail  30 sec  X 3               TB row Green  30                                                                Ther Activity                                       Gait Training                                       Modalities

## 2024-04-15 ENCOUNTER — OFFICE VISIT (OUTPATIENT)
Dept: PHYSICAL THERAPY | Facility: MEDICAL CENTER | Age: 69
End: 2024-04-15
Payer: COMMERCIAL

## 2024-04-15 DIAGNOSIS — M54.9 MID BACK PAIN: Primary | ICD-10-CM

## 2024-04-15 DIAGNOSIS — M54.2 NECK PAIN: ICD-10-CM

## 2024-04-15 PROCEDURE — 97140 MANUAL THERAPY 1/> REGIONS: CPT | Performed by: PHYSICAL THERAPIST

## 2024-04-15 PROCEDURE — 97112 NEUROMUSCULAR REEDUCATION: CPT | Performed by: PHYSICAL THERAPIST

## 2024-04-15 PROCEDURE — 97110 THERAPEUTIC EXERCISES: CPT | Performed by: PHYSICAL THERAPIST

## 2024-04-15 NOTE — PROGRESS NOTES
Daily Note     Today's date: 4/15/2024  Patient name: Bonita Candelario  : 1955  MRN: [id number]  Referring provider: Rhonda Bain DO  Dx:   Encounter Diagnosis     ICD-10-CM    1. Mid back pain  M54.9       2. Neck pain  M54.2                      Subjective:  Bonita reports doing well, no issues after evaluation       Objective: See treatment diary below      Assessment: Tolerated treatment well. Patient  continues to benefit from mobilizations and mobility exercises focusing on thoracic extension  began further scapular strengthening today      Plan: Continue per plan of care.      Precautions: None      Manuals 4/15            Prone PA thoracic AF            Prone 1st rib inferior glide R  AF            Supine mid thoracic HVLAT AF                         Neuro Re-Ed                                                                                                        Ther Ex             Thoracic extension 20 X 3 sec            Self inferior 1st rib glide 20 with SB and ROT            Seated hip ER stretch  At rail  30 sec  X 3               TB row Green  30                                                                Ther Activity                                       Gait Training                                       Modalities

## 2024-04-17 ENCOUNTER — OFFICE VISIT (OUTPATIENT)
Dept: PHYSICAL THERAPY | Facility: MEDICAL CENTER | Age: 69
End: 2024-04-17
Payer: COMMERCIAL

## 2024-04-17 DIAGNOSIS — M54.9 MID BACK PAIN: Primary | ICD-10-CM

## 2024-04-17 DIAGNOSIS — M54.2 NECK PAIN: ICD-10-CM

## 2024-04-17 PROCEDURE — 97140 MANUAL THERAPY 1/> REGIONS: CPT | Performed by: PHYSICAL THERAPIST

## 2024-04-17 PROCEDURE — 97112 NEUROMUSCULAR REEDUCATION: CPT | Performed by: PHYSICAL THERAPIST

## 2024-04-17 PROCEDURE — 97110 THERAPEUTIC EXERCISES: CPT | Performed by: PHYSICAL THERAPIST

## 2024-04-17 NOTE — PROGRESS NOTES
Daily Note     Today's date: 2024  Patient name: Bonita Candelario  : 1955  MRN: [id number]  Referring provider: Rhonda Bain DO  Dx:   Encounter Diagnosis     ICD-10-CM    1. Mid back pain  M54.9       2. Neck pain  M54.2                      Subjective:  Bonita reports doing well, no issues after evaluation       Objective: See treatment diary below      Assessment: Tolerated treatment well. Patient  continues to benefit from mobilizations and mobility exercises focusing on thoracic extension  began further scapular strengthening today      Plan: Continue per plan of care.      Precautions: None      Manuals             Prone PA thoracic AF            Prone 1st rib inferior glide R  AF            Supine mid thoracic HVLAT AF                         Neuro Re-Ed                                                                                                        Ther Ex             Thoracic extension 20 X 3 sec            Self inferior 1st rib glide 20 with SB and ROT            Seated hip ER stretch  At rail  30 sec  X 3               TB row Green  30                                                                Ther Activity                                       Gait Training                                       Modalities             Traction  Cervical  20#  10 min                             3

## 2024-04-22 ENCOUNTER — OFFICE VISIT (OUTPATIENT)
Dept: PHYSICAL THERAPY | Facility: MEDICAL CENTER | Age: 69
End: 2024-04-22
Payer: COMMERCIAL

## 2024-04-22 DIAGNOSIS — M54.2 NECK PAIN: ICD-10-CM

## 2024-04-22 DIAGNOSIS — M54.9 MID BACK PAIN: Primary | ICD-10-CM

## 2024-04-22 PROCEDURE — 97140 MANUAL THERAPY 1/> REGIONS: CPT | Performed by: PHYSICAL THERAPIST

## 2024-04-22 PROCEDURE — 97110 THERAPEUTIC EXERCISES: CPT | Performed by: PHYSICAL THERAPIST

## 2024-04-22 PROCEDURE — 97112 NEUROMUSCULAR REEDUCATION: CPT | Performed by: PHYSICAL THERAPIST

## 2024-04-22 NOTE — PROGRESS NOTES
Daily Note     Today's date: 2024  Patient name: Bonita Candelario  : 1955  MRN: [id number]  Referring provider: Rhonda Bain DO  Dx:   Encounter Diagnosis     ICD-10-CM    1. Mid back pain  M54.9       2. Neck pain  M54.2                      Subjective:  Bonita reports doing well, no issues after evaluation       Objective: See treatment diary below      Assessment: Tolerated treatment well. Patient  continues to benefit from mobilizations and mobility exercises focusing on thoracic extension  began further scapular strengthening today      Plan: Continue per plan of care.      Precautions: None      Manuals             Prone PA thoracic AF            Prone 1st rib inferior glide R  AF            Supine mid thoracic HVLAT AF                         Neuro Re-Ed                                                                                                        Ther Ex             Thoracic extension 20 X 3 sec            Self inferior 1st rib glide 20 with SB and ROT            Seated hip ER stretch  At rail  30 sec  X 3               TB row Green  30                                                                Ther Activity                                       Gait Training                                       Modalities             Traction  Cervical  20#  10 min                             3

## 2024-04-24 ENCOUNTER — OFFICE VISIT (OUTPATIENT)
Dept: PHYSICAL THERAPY | Facility: MEDICAL CENTER | Age: 69
End: 2024-04-24
Payer: COMMERCIAL

## 2024-04-24 DIAGNOSIS — M54.2 NECK PAIN: ICD-10-CM

## 2024-04-24 DIAGNOSIS — M54.9 MID BACK PAIN: Primary | ICD-10-CM

## 2024-04-24 PROCEDURE — 97140 MANUAL THERAPY 1/> REGIONS: CPT | Performed by: PHYSICAL THERAPIST

## 2024-04-24 PROCEDURE — 97112 NEUROMUSCULAR REEDUCATION: CPT | Performed by: PHYSICAL THERAPIST

## 2024-04-24 PROCEDURE — 97110 THERAPEUTIC EXERCISES: CPT | Performed by: PHYSICAL THERAPIST

## 2024-04-24 NOTE — PROGRESS NOTES
Daily Note     Today's date: 2024  Patient name: Bonita Candelario  : 1955  MRN: [id number]  Referring provider: Rhonda Bain DO  Dx:   Encounter Diagnosis     ICD-10-CM    1. Mid back pain  M54.9       2. Neck pain  M54.2                      Subjective:  Bonita reports doing well, no issues after evaluation       Objective: See treatment diary below      Assessment: Tolerated treatment well. Patient  continues to benefit from mobilizations and mobility exercises focusing on thoracic extension  began further scapular strengthening today      Plan: Continue per plan of care.      Precautions: None      Manuals             Prone PA thoracic AF            Prone 1st rib inferior glide R  AF            Supine mid thoracic HVLAT AF                         Neuro Re-Ed                                                                                                        Ther Ex             Thoracic extension 20 X 3 sec            Self inferior 1st rib glide 20 with SB and ROT            Seated hip ER stretch  At rail  30 sec  X 3               TB row Green  30                                                                Ther Activity                                       Gait Training                                       Modalities             Traction  Cervical  20#  10 min                             3

## 2024-04-29 ENCOUNTER — OFFICE VISIT (OUTPATIENT)
Dept: PHYSICAL THERAPY | Facility: MEDICAL CENTER | Age: 69
End: 2024-04-29
Payer: COMMERCIAL

## 2024-04-29 DIAGNOSIS — M54.2 NECK PAIN: ICD-10-CM

## 2024-04-29 DIAGNOSIS — M54.9 MID BACK PAIN: Primary | ICD-10-CM

## 2024-04-29 PROCEDURE — 97112 NEUROMUSCULAR REEDUCATION: CPT | Performed by: PHYSICAL THERAPIST

## 2024-04-29 PROCEDURE — 97110 THERAPEUTIC EXERCISES: CPT | Performed by: PHYSICAL THERAPIST

## 2024-04-29 PROCEDURE — 97140 MANUAL THERAPY 1/> REGIONS: CPT | Performed by: PHYSICAL THERAPIST

## 2024-04-29 NOTE — PROGRESS NOTES
Daily Note     Today's date: 2024  Patient name: Bonita Candelario  : 1955  MRN: [id number]  Referring provider: Rhonda Bain DO  Dx:   Encounter Diagnosis     ICD-10-CM    1. Mid back pain  M54.9       2. Neck pain  M54.2                      Subjective:  Bonita reports doing well,       Objective: See treatment diary below      Assessment: Tolerated treatment well. Patient  continues to benefit from mobilizations and mobility exercises focusing on thoracic extension  began further scapular strengthening today  Continue      Plan: Continue per plan of care.      Precautions: None      Manuals             Prone PA thoracic AF            Prone 1st rib inferior glide R  AF            Supine mid thoracic HVLAT AF                         Neuro Re-Ed                                                                                                        Ther Ex             Thoracic extension 20 X 3 sec            Self inferior 1st rib glide 20 with SB and ROT            Seated hip ER stretch  At rail  30 sec  X 3               TB row Green  30                                                                Ther Activity                                       Gait Training                                       Modalities             Traction  Cervical  20#  10 min                             3

## 2024-05-01 ENCOUNTER — OFFICE VISIT (OUTPATIENT)
Dept: PHYSICAL THERAPY | Facility: MEDICAL CENTER | Age: 69
End: 2024-05-01
Payer: COMMERCIAL

## 2024-05-01 DIAGNOSIS — M54.2 NECK PAIN: Primary | ICD-10-CM

## 2024-05-01 DIAGNOSIS — M54.9 MID BACK PAIN: ICD-10-CM

## 2024-05-01 PROCEDURE — 97112 NEUROMUSCULAR REEDUCATION: CPT | Performed by: PHYSICAL THERAPIST

## 2024-05-01 PROCEDURE — 97110 THERAPEUTIC EXERCISES: CPT | Performed by: PHYSICAL THERAPIST

## 2024-05-01 PROCEDURE — 97140 MANUAL THERAPY 1/> REGIONS: CPT | Performed by: PHYSICAL THERAPIST

## 2024-05-01 NOTE — PROGRESS NOTES
Daily Note     Today's date: 2024  Patient name: Bonita Candelario  : 1955  MRN: [id number]  Referring provider: Rhonda Bain DO  Dx:   Encounter Diagnosis     ICD-10-CM    1. Neck pain  M54.2       2. Mid back pain  M54.9                      Subjective:  Bonita reports doing well,       Objective: See treatment diary below      Assessment: Tolerated treatment well. Patient  continues to benefit from mobilizations and mobility exercises focusing on thoracic extension  began further scapular strengthening today  Continue      Plan: Continue per plan of care.      Precautions: None      Manuals             Prone PA thoracic AF            Prone 1st rib inferior glide R  AF            Supine mid thoracic HVLAT AF                         Neuro Re-Ed                                                                                                        Ther Ex             Thoracic extension 20 X 3 sec            Self inferior 1st rib glide 20 with SB and ROT            Seated hip ER stretch  At rail  30 sec  X 3               TB row Green  30                                                                Ther Activity                                       Gait Training                                       Modalities             Traction  Cervical  20#  10 min                             3

## 2024-05-06 ENCOUNTER — OFFICE VISIT (OUTPATIENT)
Dept: PHYSICAL THERAPY | Facility: MEDICAL CENTER | Age: 69
End: 2024-05-06
Payer: COMMERCIAL

## 2024-05-06 DIAGNOSIS — M54.2 NECK PAIN: Primary | ICD-10-CM

## 2024-05-06 DIAGNOSIS — M54.9 MID BACK PAIN: ICD-10-CM

## 2024-05-06 PROCEDURE — 97140 MANUAL THERAPY 1/> REGIONS: CPT | Performed by: PHYSICAL THERAPIST

## 2024-05-06 PROCEDURE — 97110 THERAPEUTIC EXERCISES: CPT | Performed by: PHYSICAL THERAPIST

## 2024-05-06 PROCEDURE — 97112 NEUROMUSCULAR REEDUCATION: CPT | Performed by: PHYSICAL THERAPIST

## 2024-05-06 NOTE — PROGRESS NOTES
Daily Note     Today's date: 2024  Patient name: Bonita Candelario  : 1955  MRN: [id number]  Referring provider: Rhonda Bain DO  Dx:   Encounter Diagnosis     ICD-10-CM    1. Neck pain  M54.2       2. Mid back pain  M54.9                      Subjective:  Bonita reports doing well,       Objective: See treatment diary below      Assessment: Tolerated treatment well. Patient  continues to benefit from mobilizations and mobility exercises focusing on thoracic extension  began further scapular strengthening today  Continue      Plan: Continue per plan of care.      Precautions: None      Manuals 5/6            Prone PA thoracic AF            Prone 1st rib inferior glide R  AF            Supine mid thoracic HVLAT AF                         Neuro Re-Ed                                                                                                        Ther Ex             Thoracic extension 20 X 3 sec            Self inferior 1st rib glide 20 with SB and ROT            Seated hip ER stretch  At rail  30 sec  X 3               TB row Green  30                                                                Ther Activity                                       Gait Training                                       Modalities             Traction  Cervical  20#  10 min                             3

## 2024-05-07 ENCOUNTER — RA CDI HCC (OUTPATIENT)
Dept: OTHER | Facility: HOSPITAL | Age: 69
End: 2024-05-07

## 2024-05-08 ENCOUNTER — OFFICE VISIT (OUTPATIENT)
Dept: PHYSICAL THERAPY | Facility: MEDICAL CENTER | Age: 69
End: 2024-05-08
Payer: COMMERCIAL

## 2024-05-08 DIAGNOSIS — M54.9 MID BACK PAIN: ICD-10-CM

## 2024-05-08 DIAGNOSIS — M54.2 NECK PAIN: Primary | ICD-10-CM

## 2024-05-08 PROCEDURE — 97110 THERAPEUTIC EXERCISES: CPT | Performed by: PHYSICAL THERAPIST

## 2024-05-08 PROCEDURE — 97140 MANUAL THERAPY 1/> REGIONS: CPT | Performed by: PHYSICAL THERAPIST

## 2024-05-08 PROCEDURE — 97112 NEUROMUSCULAR REEDUCATION: CPT | Performed by: PHYSICAL THERAPIST

## 2024-05-08 NOTE — PROGRESS NOTES
Daily Note     Today's date: 2024  Patient name: Bonita Candelario  : 1955  MRN: [id number]  Referring provider: Rhonda Bain DO  Dx:   Encounter Diagnosis     ICD-10-CM    1. Neck pain  M54.2       2. Mid back pain  M54.9                      Subjective:  Bonita reports doing well,       Objective: See treatment diary below      Assessment: Tolerated treatment well. Patient  continues to benefit from mobilizations and mobility exercises focusing on thoracic extension  began further scapular strengthening today        Plan: Continue per plan of care.      Precautions: None      Manuals             Prone PA thoracic AF            Prone 1st rib inferior glide R  AF            Supine mid thoracic HVLAT AF                         Neuro Re-Ed                                                                                                        Ther Ex             Thoracic extension 20 X 3 sec            Self inferior 1st rib glide 20 with SB and ROT            Seated hip ER stretch  At rail  30 sec  X 3               TB row Green  30            Thoracic rotation  Vs wall  20                                                   Ther Activity                                       Gait Training                                       Modalities             Traction  Cervical  20#  10 min                             3

## 2024-05-13 ENCOUNTER — OFFICE VISIT (OUTPATIENT)
Dept: PHYSICAL THERAPY | Facility: MEDICAL CENTER | Age: 69
End: 2024-05-13
Payer: COMMERCIAL

## 2024-05-13 ENCOUNTER — LAB (OUTPATIENT)
Dept: LAB | Facility: MEDICAL CENTER | Age: 69
End: 2024-05-13
Payer: COMMERCIAL

## 2024-05-13 DIAGNOSIS — R73.9 HYPERGLYCEMIA: ICD-10-CM

## 2024-05-13 DIAGNOSIS — M54.2 NECK PAIN: ICD-10-CM

## 2024-05-13 DIAGNOSIS — K59.09 CHRONIC CONSTIPATION: ICD-10-CM

## 2024-05-13 DIAGNOSIS — E78.00 HYPERCHOLESTEROLEMIA: ICD-10-CM

## 2024-05-13 DIAGNOSIS — M54.9 MID BACK PAIN: Primary | ICD-10-CM

## 2024-05-13 DIAGNOSIS — E03.9 ACQUIRED HYPOTHYROIDISM: ICD-10-CM

## 2024-05-13 DIAGNOSIS — E55.9 VITAMIN D DEFICIENCY: ICD-10-CM

## 2024-05-13 LAB
25(OH)D3 SERPL-MCNC: 38.5 NG/ML (ref 30–100)
ALBUMIN SERPL BCP-MCNC: 4.4 G/DL (ref 3.5–5)
ALP SERPL-CCNC: 48 U/L (ref 34–104)
ALT SERPL W P-5'-P-CCNC: 22 U/L (ref 7–52)
ANION GAP SERPL CALCULATED.3IONS-SCNC: 9 MMOL/L (ref 4–13)
AST SERPL W P-5'-P-CCNC: 24 U/L (ref 13–39)
BASOPHILS # BLD AUTO: 0.03 THOUSANDS/ÂΜL (ref 0–0.1)
BASOPHILS NFR BLD AUTO: 1 % (ref 0–1)
BILIRUB SERPL-MCNC: 0.68 MG/DL (ref 0.2–1)
BUN SERPL-MCNC: 15 MG/DL (ref 5–25)
CALCIUM SERPL-MCNC: 9.5 MG/DL (ref 8.4–10.2)
CHLORIDE SERPL-SCNC: 105 MMOL/L (ref 96–108)
CHOLEST SERPL-MCNC: 218 MG/DL
CO2 SERPL-SCNC: 27 MMOL/L (ref 21–32)
CREAT SERPL-MCNC: 0.51 MG/DL (ref 0.6–1.3)
EOSINOPHIL # BLD AUTO: 0.15 THOUSAND/ÂΜL (ref 0–0.61)
EOSINOPHIL NFR BLD AUTO: 3 % (ref 0–6)
ERYTHROCYTE [DISTWIDTH] IN BLOOD BY AUTOMATED COUNT: 12.3 % (ref 11.6–15.1)
EST. AVERAGE GLUCOSE BLD GHB EST-MCNC: 111 MG/DL
GFR SERPL CREATININE-BSD FRML MDRD: 100 ML/MIN/1.73SQ M
GLUCOSE P FAST SERPL-MCNC: 98 MG/DL (ref 65–99)
HBA1C MFR BLD: 5.5 %
HCT VFR BLD AUTO: 40.5 % (ref 34.8–46.1)
HDLC SERPL-MCNC: 56 MG/DL
HGB BLD-MCNC: 13.2 G/DL (ref 11.5–15.4)
IMM GRANULOCYTES # BLD AUTO: 0.02 THOUSAND/UL (ref 0–0.2)
IMM GRANULOCYTES NFR BLD AUTO: 1 % (ref 0–2)
LDLC SERPL CALC-MCNC: 136 MG/DL (ref 0–100)
LYMPHOCYTES # BLD AUTO: 1.9 THOUSANDS/ÂΜL (ref 0.6–4.47)
LYMPHOCYTES NFR BLD AUTO: 44 % (ref 14–44)
MCH RBC QN AUTO: 31.6 PG (ref 26.8–34.3)
MCHC RBC AUTO-ENTMCNC: 32.6 G/DL (ref 31.4–37.4)
MCV RBC AUTO: 97 FL (ref 82–98)
MONOCYTES # BLD AUTO: 0.35 THOUSAND/ÂΜL (ref 0.17–1.22)
MONOCYTES NFR BLD AUTO: 8 % (ref 4–12)
NEUTROPHILS # BLD AUTO: 1.92 THOUSANDS/ÂΜL (ref 1.85–7.62)
NEUTS SEG NFR BLD AUTO: 43 % (ref 43–75)
NRBC BLD AUTO-RTO: 0 /100 WBCS
PLATELET # BLD AUTO: 262 THOUSANDS/UL (ref 149–390)
PMV BLD AUTO: 10.3 FL (ref 8.9–12.7)
POTASSIUM SERPL-SCNC: 4.4 MMOL/L (ref 3.5–5.3)
PROT SERPL-MCNC: 6.7 G/DL (ref 6.4–8.4)
RBC # BLD AUTO: 4.18 MILLION/UL (ref 3.81–5.12)
SODIUM SERPL-SCNC: 141 MMOL/L (ref 135–147)
TRIGL SERPL-MCNC: 132 MG/DL
TSH SERPL DL<=0.05 MIU/L-ACNC: 1.77 UIU/ML (ref 0.45–4.5)
WBC # BLD AUTO: 4.37 THOUSAND/UL (ref 4.31–10.16)

## 2024-05-13 PROCEDURE — 36415 COLL VENOUS BLD VENIPUNCTURE: CPT

## 2024-05-13 PROCEDURE — 97140 MANUAL THERAPY 1/> REGIONS: CPT | Performed by: PHYSICAL THERAPIST

## 2024-05-13 PROCEDURE — 97110 THERAPEUTIC EXERCISES: CPT | Performed by: PHYSICAL THERAPIST

## 2024-05-13 PROCEDURE — 83036 HEMOGLOBIN GLYCOSYLATED A1C: CPT

## 2024-05-13 PROCEDURE — 97112 NEUROMUSCULAR REEDUCATION: CPT | Performed by: PHYSICAL THERAPIST

## 2024-05-13 PROCEDURE — 82306 VITAMIN D 25 HYDROXY: CPT

## 2024-05-13 PROCEDURE — 80053 COMPREHEN METABOLIC PANEL: CPT

## 2024-05-13 PROCEDURE — 84443 ASSAY THYROID STIM HORMONE: CPT

## 2024-05-13 PROCEDURE — 85025 COMPLETE CBC W/AUTO DIFF WBC: CPT

## 2024-05-13 PROCEDURE — 80061 LIPID PANEL: CPT

## 2024-05-13 NOTE — PROGRESS NOTES
Daily Note     Today's date: 2024  Patient name: Bonita Candelario  : 1955  MRN: [id number]  Referring provider: Rhonda Bain DO  Dx:   Encounter Diagnosis     ICD-10-CM    1. Mid back pain  M54.9       2. Neck pain  M54.2                      Subjective:  Bonita reports doing well,       Objective: See treatment diary below      Assessment: Tolerated treatment well. Patient  continues to benefit from mobilizations and mobility exercises focusing on thoracic extension  began further scapular strengthening today without issue      Plan: Continue per plan of care.      Precautions: None      Manuals             Prone PA thoracic AF            Prone 1st rib inferior glide R  AF            Supine mid thoracic HVLAT AF                         Neuro Re-Ed                                                                                                        Ther Ex             Thoracic extension 20 X 3 sec            Self inferior 1st rib glide 20 with SB and ROT            Seated hip ER stretch  At rail  30 sec  X 3               TB row Green  30            Thoracic rotation  Vs wall  20                                                   Ther Activity                                       Gait Training                                       Modalities             Traction  Cervical  20#  10 min                             3

## 2024-05-15 ENCOUNTER — OFFICE VISIT (OUTPATIENT)
Dept: PHYSICAL THERAPY | Facility: MEDICAL CENTER | Age: 69
End: 2024-05-15
Payer: COMMERCIAL

## 2024-05-15 ENCOUNTER — OFFICE VISIT (OUTPATIENT)
Dept: FAMILY MEDICINE CLINIC | Facility: CLINIC | Age: 69
End: 2024-05-15
Payer: COMMERCIAL

## 2024-05-15 VITALS
SYSTOLIC BLOOD PRESSURE: 122 MMHG | HEART RATE: 55 BPM | OXYGEN SATURATION: 96 % | DIASTOLIC BLOOD PRESSURE: 78 MMHG | HEIGHT: 62 IN | BODY MASS INDEX: 24.44 KG/M2 | WEIGHT: 132.8 LBS

## 2024-05-15 DIAGNOSIS — E55.9 VITAMIN D DEFICIENCY: ICD-10-CM

## 2024-05-15 DIAGNOSIS — R73.9 HYPERGLYCEMIA: ICD-10-CM

## 2024-05-15 DIAGNOSIS — M54.50 CHRONIC BILATERAL LOW BACK PAIN WITHOUT SCIATICA: ICD-10-CM

## 2024-05-15 DIAGNOSIS — M54.2 NECK PAIN: ICD-10-CM

## 2024-05-15 DIAGNOSIS — M54.9 MID BACK PAIN: Primary | ICD-10-CM

## 2024-05-15 DIAGNOSIS — M75.81 TENDINITIS OF RIGHT ROTATOR CUFF: ICD-10-CM

## 2024-05-15 DIAGNOSIS — G89.29 CHRONIC BILATERAL LOW BACK PAIN WITHOUT SCIATICA: ICD-10-CM

## 2024-05-15 DIAGNOSIS — E78.00 HYPERCHOLESTEROLEMIA: ICD-10-CM

## 2024-05-15 DIAGNOSIS — H61.21 IMPACTED CERUMEN OF RIGHT EAR: ICD-10-CM

## 2024-05-15 DIAGNOSIS — Z00.00 MEDICARE ANNUAL WELLNESS VISIT, SUBSEQUENT: Primary | ICD-10-CM

## 2024-05-15 DIAGNOSIS — E03.9 ACQUIRED HYPOTHYROIDISM: ICD-10-CM

## 2024-05-15 PROBLEM — M25.559 HIP PAIN: Status: RESOLVED | Noted: 2018-06-12 | Resolved: 2024-05-15

## 2024-05-15 PROBLEM — N95.0 POSTMENOPAUSAL BLEEDING: Status: RESOLVED | Noted: 2024-01-08 | Resolved: 2024-05-15

## 2024-05-15 PROBLEM — R11.2 PONV (POSTOPERATIVE NAUSEA AND VOMITING): Status: RESOLVED | Noted: 2024-01-07 | Resolved: 2024-05-15

## 2024-05-15 PROBLEM — Z98.890 PONV (POSTOPERATIVE NAUSEA AND VOMITING): Status: RESOLVED | Noted: 2024-01-07 | Resolved: 2024-05-15

## 2024-05-15 PROBLEM — C44.91 CANCER OF THE SKIN, BASAL CELL: Status: ACTIVE | Noted: 2024-05-15

## 2024-05-15 PROCEDURE — 99214 OFFICE O/P EST MOD 30 MIN: CPT | Performed by: FAMILY MEDICINE

## 2024-05-15 PROCEDURE — G0439 PPPS, SUBSEQ VISIT: HCPCS | Performed by: FAMILY MEDICINE

## 2024-05-15 PROCEDURE — 97110 THERAPEUTIC EXERCISES: CPT | Performed by: PHYSICAL THERAPIST

## 2024-05-15 PROCEDURE — 97140 MANUAL THERAPY 1/> REGIONS: CPT | Performed by: PHYSICAL THERAPIST

## 2024-05-15 PROCEDURE — 97112 NEUROMUSCULAR REEDUCATION: CPT | Performed by: PHYSICAL THERAPIST

## 2024-05-15 RX ORDER — MELOXICAM 15 MG/1
15 TABLET ORAL DAILY PRN
Qty: 30 TABLET | Refills: 1 | Status: SHIPPED | OUTPATIENT
Start: 2024-05-15

## 2024-05-15 NOTE — PATIENT INSTRUCTIONS
Medicare Preventive Visit Patient Instructions  Thank you for completing your Welcome to Medicare Visit or Medicare Annual Wellness Visit today. Your next wellness visit will be due in one year (5/16/2025).  The screening/preventive services that you may require over the next 5-10 years are detailed below. Some tests may not apply to you based off risk factors and/or age. Screening tests ordered at today's visit but not completed yet may show as past due. Also, please note that scanned in results may not display below.  Preventive Screenings:  Service Recommendations Previous Testing/Comments   Colorectal Cancer Screening  * Colonoscopy    * Fecal Occult Blood Test (FOBT)/Fecal Immunochemical Test (FIT)  * Fecal DNA/Cologuard Test  * Flexible Sigmoidoscopy Age: 45-75 years old   Colonoscopy: every 10 years (may be performed more frequently if at higher risk)  OR  FOBT/FIT: every 1 year  OR  Cologuard: every 3 years  OR  Sigmoidoscopy: every 5 years  Screening may be recommended earlier than age 45 if at higher risk for colorectal cancer. Also, an individualized decision between you and your healthcare provider will decide whether screening between the ages of 76-85 would be appropriate. Colonoscopy: 05/06/2021  FOBT/FIT: Not on file  Cologuard: Not on file  Sigmoidoscopy: Not on file          Breast Cancer Screening Age: 40+ years old  Frequency: every 1-2 years  Not required if history of left and right mastectomy Mammogram: 07/06/2023        Cervical Cancer Screening Between the ages of 21-29, pap smear recommended once every 3 years.   Between the ages of 30-65, can perform pap smear with HPV co-testing every 5 years.   Recommendations may differ for women with a history of total hysterectomy, cervical cancer, or abnormal pap smears in past. Pap Smear: 02/17/2023        Hepatitis C Screening Once for adults born between 1945 and 1965  More frequently in patients at high risk for Hepatitis C Hep C Antibody:  03/22/2019        Diabetes Screening 1-2 times per year if you're at risk for diabetes or have pre-diabetes Fasting glucose: 98 mg/dL (5/13/2024)  A1C: 5.5 % (5/13/2024)      Cholesterol Screening Once every 5 years if you don't have a lipid disorder. May order more often based on risk factors. Lipid panel: 05/13/2024          Other Preventive Screenings Covered by Medicare:  Abdominal Aortic Aneurysm (AAA) Screening: covered once if your at risk. You're considered to be at risk if you have a family history of AAA.  Lung Cancer Screening: covers low dose CT scan once per year if you meet all of the following conditions: (1) Age 55-77; (2) No signs or symptoms of lung cancer; (3) Current smoker or have quit smoking within the last 15 years; (4) You have a tobacco smoking history of at least 20 pack years (packs per day multiplied by number of years you smoked); (5) You get a written order from a healthcare provider.  Glaucoma Screening: covered annually if you're considered high risk: (1) You have diabetes OR (2) Family history of glaucoma OR (3)  aged 50 and older OR (4)  American aged 65 and older  Osteoporosis Screening: covered every 2 years if you meet one of the following conditions: (1) You're estrogen deficient and at risk for osteoporosis based off medical history and other findings; (2) Have a vertebral abnormality; (3) On glucocorticoid therapy for more than 3 months; (4) Have primary hyperparathyroidism; (5) On osteoporosis medications and need to assess response to drug therapy.   Last bone density test (DXA Scan): 02/06/2023.  HIV Screening: covered annually if you're between the age of 15-65. Also covered annually if you are younger than 15 and older than 65 with risk factors for HIV infection. For pregnant patients, it is covered up to 3 times per pregnancy.    Immunizations:  Immunization Recommendations   Influenza Vaccine Annual influenza vaccination during flu season is  recommended for all persons aged >= 6 months who do not have contraindications   Pneumococcal Vaccine   * Pneumococcal conjugate vaccine = PCV13 (Prevnar 13), PCV15 (Vaxneuvance), PCV20 (Prevnar 20)  * Pneumococcal polysaccharide vaccine = PPSV23 (Pneumovax) Adults 19-63 yo with certain risk factors or if 65+ yo  If never received any pneumonia vaccine: recommend Prevnar 20 (PCV20)  Give PCV20 if previously received 1 dose of PCV13 or PPSV23   Hepatitis B Vaccine 3 dose series if at intermediate or high risk (ex: diabetes, end stage renal disease, liver disease)   Respiratory syncytial virus (RSV) Vaccine - COVERED BY MEDICARE PART D  * RSVPreF3 (Arexvy) CDC recommends that adults 60 years of age and older may receive a single dose of RSV vaccine using shared clinical decision-making (SCDM)   Tetanus (Td) Vaccine - COST NOT COVERED BY MEDICARE PART B Following completion of primary series, a booster dose should be given every 10 years to maintain immunity against tetanus. Td may also be given as tetanus wound prophylaxis.   Tdap Vaccine - COST NOT COVERED BY MEDICARE PART B Recommended at least once for all adults. For pregnant patients, recommended with each pregnancy.   Shingles Vaccine (Shingrix) - COST NOT COVERED BY MEDICARE PART B  2 shot series recommended in those 19 years and older who have or will have weakened immune systems or those 50 years and older     Health Maintenance Due:      Topic Date Due   • Breast Cancer Screening: Mammogram  07/06/2024   • DXA SCAN  02/02/2025   • Cervical Cancer Screening  02/17/2026   • Colorectal Cancer Screening  05/06/2026   • Hepatitis C Screening  Completed     Immunizations Due:  There are no preventive care reminders to display for this patient.  Advance Directives   What are advance directives?  Advance directives are legal documents that state your wishes and plans for medical care. These plans are made ahead of time in case you lose your ability to make decisions  for yourself. Advance directives can apply to any medical decision, such as the treatments you want, and if you want to donate organs.   What are the types of advance directives?  There are many types of advance directives, and each state has rules about how to use them. You may choose a combination of any of the following:  Living will:  This is a written record of the treatment you want. You can also choose which treatments you do not want, which to limit, and which to stop at a certain time. This includes surgery, medicine, IV fluid, and tube feedings.   Durable power of  for healthcare (DPAHC):  This is a written record that states who you want to make healthcare choices for you when you are unable to make them for yourself. This person, called a proxy, is usually a family member or a friend. You may choose more than 1 proxy.  Do not resuscitate (DNR) order:  A DNR order is used in case your heart stops beating or you stop breathing. It is a request not to have certain forms of treatment, such as CPR. A DNR order may be included in other types of advance directives.  Medical directive:  This covers the care that you want if you are in a coma, near death, or unable to make decisions for yourself. You can list the treatments you want for each condition. Treatment may include pain medicine, surgery, blood transfusions, dialysis, IV or tube feedings, and a ventilator (breathing machine).  Values history:  This document has questions about your views, beliefs, and how you feel and think about life. This information can help others choose the care that you would choose.  Why are advance directives important?  An advance directive helps you control your care. Although spoken wishes may be used, it is better to have your wishes written down. Spoken wishes can be misunderstood, or not followed. Treatments may be given even if you do not want them. An advance directive may make it easier for your family to make  difficult choices about your care.       © Copyright [employer] 2018 Information is for End User's use only and may not be sold, redistributed or otherwise used for commercial purposes. All illustrations and images included in CareNotes® are the copyrighted property of [employer] or [employer]

## 2024-05-15 NOTE — PROGRESS NOTES
Ambulatory Visit  Name: Bonita Candelario      : 1955      MRN: [id number]  Encounter Provider: Salinas Lewis Jr, MD  Encounter Date: 5/15/2024   [facility]    Assessment & Plan   1. Medicare annual wellness visit, subsequent  -     CT coronary calcium score; Future; Expected date: 05/15/2024  -     VAS screening; Future; Expected date: 05/15/2024  2. Tendinitis of right rotator cuff  -     meloxicam (MOBIC) 15 mg tablet; Take 1 tablet (15 mg total) by mouth daily as needed for moderate pain  3. Acquired hypothyroidism  -     TSH, 3rd generation with Free T4 reflex; Future; Expected date: 10/15/2024  4. Hypercholesterolemia  -     CT coronary calcium score; Future; Expected date: 05/15/2024  -     VAS screening; Future; Expected date: 05/15/2024  -     CBC and differential; Future; Expected date: 10/15/2024  -     Comprehensive metabolic panel; Future; Expected date: 10/15/2024  -     Lipid Panel with Direct LDL reflex; Future; Expected date: 10/15/2024  5. Hyperglycemia  -     Hemoglobin A1C; Future  6. Chronic bilateral low back pain without sciatica  -     XR spine lumbar minimum 4 views non injury; Future; Expected date: 05/15/2024  7. Vitamin D deficiency  -     Vitamin D 25 hydroxy; Future  8. Impacted cerumen of right ear  Assessment & Plan:  She is going to follow-up with ENT      Depression Screening and Follow-up Plan: Patient was screened for depression during today's encounter. They screened negative with a PHQ-2 score of 0.      Preventive health issues were discussed with patient, and age appropriate screening tests were ordered as noted in patient's After Visit Summary. Personalized health advice and appropriate referrals for health education or preventive services given if needed, as noted in patient's After Visit Summary.    History of Present Illness     HPI for an annual wellness visit as well as follow-up of chronic health issues.  She  continues have some arthralgias intermittently.  Her right shoulder still gives her hard time on occasion.  She denies any positive chest pain, shortness of breath palpitations.  She is compliant with her thyroid medication and denies excessive fatigue.  She is having some ongoing low back pain for which she is treating with chiropractic care.  She takes meloxicam on occasion.  She denies radicular symptoms.  The pain is mostly localized to the bilateral lumbar spine just above her buttocks.  It is worse with prolonged activity.  Patient Care Team:  Salinas Andersen Jr., MD as PCP - General    Review of Systems   Constitutional:  Negative for appetite change, chills, fatigue, fever and unexpected weight change.   HENT:  Negative for trouble swallowing.    Eyes:  Negative for visual disturbance.   Respiratory:  Negative for cough, chest tightness, shortness of breath and wheezing.    Cardiovascular:  Negative for chest pain, palpitations and leg swelling.   Gastrointestinal:  Negative for abdominal distention, abdominal pain, blood in stool, constipation and diarrhea.   Endocrine: Negative for polyuria.   Genitourinary:  Negative for difficulty urinating and flank pain.   Musculoskeletal:  Positive for arthralgias and back pain. Negative for myalgias.   Skin:  Negative for rash.   Neurological:  Negative for dizziness and light-headedness.   Hematological:  Negative for adenopathy. Does not bruise/bleed easily.   Psychiatric/Behavioral:  Negative for dysphoric mood and sleep disturbance. The patient is not nervous/anxious.      Medical History Reviewed by provider this encounter:       Annual Wellness Visit Questionnaire       Health Risk Assessment:   Patient rates overall health as very good. Patient feels that their physical health rating is same. Patient is satisfied with their life. Eyesight was rated as same. Hearing was rated as same. Patient feels that their emotional and mental health rating is same.  Patients states they are never, rarely angry. Patient states they are never, rarely unusually tired/fatigued. Pain experienced in the last 7 days has been some. Patient's pain rating has been 4/10. Patient states that she has experienced no weight loss or gain in last 6 months.     Depression Screening:   PHQ-2 Score: 0      Fall Risk Screening:   In the past year, patient has experienced: no history of falling in past year      Home Safety:  Patient does not have trouble with stairs inside or outside of their home. Patient has no working smoke alarms     Medications:   Patient is currently taking over-the-counter supplements. OTC medications include: see medication list. Patient is able to manage medications.     Activities of Daily Living (ADLs)/Instrumental Activities of Daily Living (IADLs):   Walk and transfer into and out of bed and chair?: Yes  Dress and groom yourself?: Yes    Bathe or shower yourself?: Yes    Feed yourself? Yes  Do your laundry/housekeeping?: Yes  Manage your money, pay your bills and track your expenses?: Yes  Make your own meals?: Yes    Do your own shopping?: Yes    Previous Hospitalizations:   Any hospitalizations or ED visits within the last 12 months?: Yes    How many hospitalizations have you had in the last year?: 1-2    Advance Care Planning:   Living will: No    Durable POA for healthcare: No    Advanced directive counseling given: No    ACP document given: Yes      Cognitive Screening:   Provider or family/friend/caregiver concerned regarding cognition?: No    PREVENTIVE SCREENINGS      Cardiovascular Screening:    General: Screening Not Indicated and History Lipid Disorder      Diabetes Screening:     General: Screening Current      Colorectal Cancer Screening:     General: Screening Current      Breast Cancer Screening:     General: Screening Current      Cervical Cancer Screening:    General: Screening Not Indicated      Osteoporosis Screening:    General: Screening  "Current      Abdominal Aortic Aneurysm (AAA) Screening:        General: Screening Not Indicated      Lung Cancer Screening:     General: Screening Not Indicated      Hepatitis C Screening:    General: Screening Current    Screening, Brief Intervention, and Referral to Treatment (SBIRT)    Screening  Typical number of drinks in a day: 1  Typical number of drinks in a week: 6  Interpretation: Low risk drinking behavior.    AUDIT-C Screenin) How often did you have a drink containing alcohol in the past year? 4 or more times a week  2) How many drinks did you have on a typical day when you were drinking in the past year? 1 to 2  3) How often did you have 6 or more drinks on one occasion in the past year? never    AUDIT-C Score: 4  Interpretation: Score 3-12 (female): POSITIVE screen for alcohol misuse    Other Counseling Topics:   Regular weightbearing exercise and calcium and vitamin D intake.     Social Determinants of Health     Financial Resource Strain: Low Risk  (5/10/2023)    Overall Financial Resource Strain (CARDIA)    • Difficulty of Paying Living Expenses: Not hard at all   Food Insecurity: No Food Insecurity (5/15/2024)    Hunger Vital Sign    • Worried About Running Out of Food in the Last Year: Never true    • Ran Out of Food in the Last Year: Never true   Transportation Needs: No Transportation Needs (5/15/2024)    PRAPARE - Transportation    • Lack of Transportation (Medical): No    • Lack of Transportation (Non-Medical): No   Housing Stability: Low Risk  (5/15/2024)    Housing Stability Vital Sign    • Unable to Pay for Housing in the Last Year: No    • Number of Times Moved in the Last Year: 1    • Homeless in the Last Year: No   Utilities: Not At Risk (5/15/2024)    [facility] Utilities    • Threatened with loss of utilities: No     No results found.    Objective     /78 (BP Location: Left arm, Patient Position: Sitting, Cuff Size: Standard)   Pulse 55   Ht 5' 2\" (1.575 m)   Wt 60.2 kg (132 lb " 12.8 oz)   SpO2 96%   BMI 24.29 kg/m²     Physical Exam  Constitutional:       General: She is not in acute distress.     Appearance: She is well-developed. She is not diaphoretic.   HENT:      Head: Normocephalic.      Right Ear: External ear normal.      Left Ear: External ear normal.      Nose: Nose normal.   Eyes:      General: No scleral icterus.        Right eye: No discharge.         Left eye: No discharge.      Conjunctiva/sclera: Conjunctivae normal.      Pupils: Pupils are equal, round, and reactive to light.   Neck:      Thyroid: No thyromegaly.      Trachea: No tracheal deviation.   Cardiovascular:      Rate and Rhythm: Normal rate and regular rhythm.      Heart sounds: Normal heart sounds. No murmur heard.  Pulmonary:      Effort: Pulmonary effort is normal. No respiratory distress.      Breath sounds: Normal breath sounds. No stridor. No wheezing, rhonchi or rales.   Abdominal:      General: Bowel sounds are normal. There is no distension.      Palpations: Abdomen is soft.      Tenderness: There is no abdominal tenderness. There is no guarding.   Musculoskeletal:         General: No tenderness or deformity. Normal range of motion.      Right lower leg: No edema.      Left lower leg: No edema.   Lymphadenopathy:      Cervical: No cervical adenopathy.   Skin:     General: Skin is warm.      Coloration: Skin is not pale.      Findings: No erythema or rash.   Neurological:      Cranial Nerves: No cranial nerve deficit.      Coordination: Coordination normal.   Psychiatric:         Mood and Affect: Mood normal.         Behavior: Behavior normal.         Thought Content: Thought content normal.       Administrative Statements

## 2024-05-15 NOTE — PROGRESS NOTES
Ambulatory Visit  Name: Bonita Candelario      : 1955      MRN: [id number]  Encounter Provider: Salinas Lewis Jr, MD  Encounter Date: 5/15/2024   Encounter department: [facility] PRIMARY CARE    Assessment & Plan   1. Medicare annual wellness visit, subsequent  -     CT coronary calcium score; Future; Expected date: 05/15/2024  -     VAS screening; Future; Expected date: 05/15/2024  2. Tendinitis of right rotator cuff  -     meloxicam (MOBIC) 15 mg tablet; Take 1 tablet (15 mg total) by mouth daily as needed for moderate pain  3. Acquired hypothyroidism  -     TSH, 3rd generation with Free T4 reflex; Future; Expected date: 10/15/2024  4. Hypercholesterolemia  -     CT coronary calcium score; Future; Expected date: 05/15/2024  -     VAS screening; Future; Expected date: 05/15/2024  -     CBC and differential; Future; Expected date: 10/15/2024  -     Comprehensive metabolic panel; Future; Expected date: 10/15/2024  -     Lipid Panel with Direct LDL reflex; Future; Expected date: 10/15/2024  5. Hyperglycemia  -     Hemoglobin A1C; Future  6. Chronic bilateral low back pain without sciatica  -     XR spine lumbar minimum 4 views non injury; Future; Expected date: 05/15/2024  7. Vitamin D deficiency  -     Vitamin D 25 hydroxy; Future  8. Impacted cerumen of right ear  Assessment & Plan:  She is going to follow-up with ENT       Preventive health issues were discussed with patient, and age appropriate screening tests were ordered as noted in patient's After Visit Summary. Personalized health advice and appropriate referrals for health education or preventive services given if needed, as noted in patient's After Visit Summary.    History of Present Illness     HPI   Patient Care Team:  Salinas Lewis Jr., MD as PCP - General    Review of Systems  Medical History Reviewed by provider this encounter:       Annual Wellness Visit Questionnaire       Health Risk Assessment:   Patient rates overall  health as very good. Patient feels that their physical health rating is same. Patient is satisfied with their life. Eyesight was rated as same. Hearing was rated as same. Patient feels that their emotional and mental health rating is same. Patients states they are never, rarely angry. Patient states they are never, rarely unusually tired/fatigued. Pain experienced in the last 7 days has been some. Patient's pain rating has been 4/10. Patient states that she has experienced no weight loss or gain in last 6 months.     Depression Screening:   PHQ-2 Score: 0      Fall Risk Screening:   In the past year, patient has experienced: no history of falling in past year      Urinary Incontinence Screening:   Patient has not leaked urine accidently in the last six months.     Home Safety:  Patient does not have trouble with stairs inside or outside of their home. Patient has working smoke alarms and has working carbon monoxide detector. Home safety hazards include: none.     Nutrition:   Current diet is Regular.     Medications:   Patient is currently taking over-the-counter supplements. OTC medications include: see medication list. Patient is able to manage medications.     Activities of Daily Living (ADLs)/Instrumental Activities of Daily Living (IADLs):   Walk and transfer into and out of bed and chair?: Yes  Dress and groom yourself?: Yes    Bathe or shower yourself?: Yes    Feed yourself? Yes  Do your laundry/housekeeping?: Yes  Manage your money, pay your bills and track your expenses?: Yes  Make your own meals?: Yes    Do your own shopping?: Yes    Previous Hospitalizations:   Any hospitalizations or ED visits within the last 12 months?: Yes    How many hospitalizations have you had in the last year?: 1-2    PREVENTIVE SCREENINGS      Cardiovascular Screening:    General: Screening Not Indicated and History Lipid Disorder      Diabetes Screening:     General: Screening Current      Colorectal Cancer Screening:      General: Screening Current      Breast Cancer Screening:     General: Screening Current      Cervical Cancer Screening:    General: Screening Not Indicated      Osteoporosis Screening:    General: Screening Current      Lung Cancer Screening:     General: Screening Not Indicated      Hepatitis C Screening:    General: Screening Current    Screening, Brief Intervention, and Referral to Treatment (SBIRT)    Screening  Typical number of drinks in a day: 1  Typical number of drinks in a week: 6  Interpretation: Low risk drinking behavior.    AUDIT-C Screenin) How often did you have a drink containing alcohol in the past year? 4 or more times a week  2) How many drinks did you have on a typical day when you were drinking in the past year? 1 to 2  3) How often did you have 6 or more drinks on one occasion in the past year? never    AUDIT-C Score: 4  Interpretation: Score 3-12 (female): POSITIVE screen for alcohol misuse    AUDIT Screenin) How often during the last year have you found that you were not able to stop drinking once you had started? 0 - never  5) How often during the last year have you failed to do what was normally expected from you because of drinking? 0 - never  6) How often during the last year have you needed a first drink in the morning to get yourself going after a heavy drinking session? 0 - never  7) How often during the last year have you had a feeling of guilt or remorse after drinking? 0 - never  8) How often during the last year have you been unable to remember what happened the night before because you had been drinking? 0 - never  9) Have you or someone else been injured as a result of your drinking? 0 - no  10) Has a relative or friend or a doctor or another health worker been concerned about your drinking or suggested you cut down? 0 - no    AUDIT Score: 4  Interpretation: Low risk alcohol consumption    Social Determinants of Health     Financial Resource Strain: Low Risk   "(5/10/2023)    Overall Financial Resource Strain (CARDIA)     Difficulty of Paying Living Expenses: Not hard at all   Food Insecurity: No Food Insecurity (5/15/2024)    Hunger Vital Sign     Worried About Running Out of Food in the Last Year: Never true     Ran Out of Food in the Last Year: Never true   Transportation Needs: No Transportation Needs (5/15/2024)    PRAPARE - Transportation     Lack of Transportation (Medical): No     Lack of Transportation (Non-Medical): No   Housing Stability: Low Risk  (5/15/2024)    Housing Stability Vital Sign     Unable to Pay for Housing in the Last Year: No     Number of Times Moved in the Last Year: 1     Homeless in the Last Year: No   Utilities: Not At Risk (5/15/2024)    [facility] Utilities     Threatened with loss of utilities: No     No results found.    Objective     /78 (BP Location: Left arm, Patient Position: Sitting, Cuff Size: Standard)   Pulse 55   Ht 5' 2\" (1.575 m)   Wt 60.2 kg (132 lb 12.8 oz)   SpO2 96%   BMI 24.29 kg/m²     Physical Exam  Administrative Statements           "

## 2024-05-17 NOTE — PROGRESS NOTES
Daily Note     Today's date: 2024  Patient name: Bonita Candelario  : 1955  MRN: [id number]  Referring provider: Rhonda Bain DO  Dx:   Encounter Diagnosis     ICD-10-CM    1. Mid back pain  M54.9       2. Neck pain  M54.2                      Subjective:  Bonita reports doing well,       Objective: See treatment diary below      Assessment: Tolerated treatment well. Patient  continues to benefit from mobilizations and mobility exercises focusing on thoracic extension  began further scapular strengthening today without issue      Plan: Continue per plan of care.      Precautions: None      Manuals 5/15            Prone PA thoracic AF            Prone 1st rib inferior glide R  AF            Supine mid thoracic HVLAT AF                         Neuro Re-Ed                                                                                                        Ther Ex             Thoracic extension 20 X 3 sec            Self inferior 1st rib glide 20 with SB and ROT            Seated hip ER stretch  At rail  30 sec  X 3               TB row Green  30            Thoracic rotation  Vs wall  20                                                   Ther Activity                                       Gait Training                                       Modalities             Traction  Cervical  20#  10 min                             3

## 2024-05-20 ENCOUNTER — OFFICE VISIT (OUTPATIENT)
Dept: PHYSICAL THERAPY | Facility: MEDICAL CENTER | Age: 69
End: 2024-05-20
Payer: COMMERCIAL

## 2024-05-20 DIAGNOSIS — M54.9 MID BACK PAIN: Primary | ICD-10-CM

## 2024-05-20 DIAGNOSIS — M54.2 NECK PAIN: ICD-10-CM

## 2024-05-20 PROCEDURE — 97112 NEUROMUSCULAR REEDUCATION: CPT | Performed by: PHYSICAL THERAPIST

## 2024-05-20 PROCEDURE — 97110 THERAPEUTIC EXERCISES: CPT | Performed by: PHYSICAL THERAPIST

## 2024-05-20 PROCEDURE — 97140 MANUAL THERAPY 1/> REGIONS: CPT | Performed by: PHYSICAL THERAPIST

## 2024-05-20 NOTE — PROGRESS NOTES
Daily Note     Today's date: 2024  Patient name: Bonita Candelario  : 1955  MRN: [id number]  Referring provider: Rhonda Bani DO  Dx:   Encounter Diagnosis     ICD-10-CM    1. Mid back pain  M54.9       2. Neck pain  M54.2                      Subjective:  Bonita reports doing well,       Objective: See treatment diary below      Assessment: Tolerated treatment well. Patient  continues to benefit from mobilizations and mobility exercises focusing on thoracic extension      Plan: Continue per plan of care.      Precautions: None      Manuals /            Prone PA thoracic AF            Prone 1st rib inferior glide R  AF            Supine mid thoracic HVLAT AF                         Neuro Re-Ed                                                                                                        Ther Ex             Thoracic extension 20 X 3 sec            Self inferior 1st rib glide 20 with SB and ROT            Seated hip ER stretch  At rail  30 sec  X 3               TB row Green  30            Thoracic rotation  Vs wall  20                                                   Ther Activity                                       Gait Training                                       Modalities             Traction  Cervical  20#  10 min                             3

## 2024-05-22 ENCOUNTER — OFFICE VISIT (OUTPATIENT)
Dept: PHYSICAL THERAPY | Facility: MEDICAL CENTER | Age: 69
End: 2024-05-22
Payer: COMMERCIAL

## 2024-05-22 DIAGNOSIS — M54.2 NECK PAIN: ICD-10-CM

## 2024-05-22 DIAGNOSIS — M54.9 MID BACK PAIN: Primary | ICD-10-CM

## 2024-05-22 PROCEDURE — 97110 THERAPEUTIC EXERCISES: CPT | Performed by: PHYSICAL THERAPIST

## 2024-05-22 PROCEDURE — 97140 MANUAL THERAPY 1/> REGIONS: CPT | Performed by: PHYSICAL THERAPIST

## 2024-05-22 PROCEDURE — 97112 NEUROMUSCULAR REEDUCATION: CPT | Performed by: PHYSICAL THERAPIST

## 2024-05-22 NOTE — PROGRESS NOTES
Daily Note     Today's date: 2024  Patient name: Bonita Candelario  : 1955  MRN: [id number]  Referring provider: Rhonda Bain DO  Dx:   Encounter Diagnosis     ICD-10-CM    1. Mid back pain  M54.9       2. Neck pain  M54.2                      Subjective:  Bonita reports doing well,       Objective: See treatment diary below      Assessment: Tolerated treatment well. Patient  continues to benefit from mobilizations and mobility exercises focusing on thoracic extension      Plan: Continue per plan of care.      Precautions: None      Manuals             Prone PA thoracic AF            Prone 1st rib inferior glide R  AF            Supine mid thoracic HVLAT AF                         Neuro Re-Ed                                                                                                        Ther Ex             Thoracic extension 20 X 3 sec            Self inferior 1st rib glide 20 with SB and ROT            Seated hip ER stretch  At rail  30 sec  X 3               TB row Green  30            Thoracic rotation  Vs wall  20                                                   Ther Activity                                       Gait Training                                       Modalities             Traction  Cervical  20#  10 min                             3

## 2024-05-28 ENCOUNTER — OFFICE VISIT (OUTPATIENT)
Dept: PHYSICAL THERAPY | Facility: MEDICAL CENTER | Age: 69
End: 2024-05-28
Payer: COMMERCIAL

## 2024-05-28 DIAGNOSIS — M54.9 MID BACK PAIN: ICD-10-CM

## 2024-05-28 DIAGNOSIS — M54.2 NECK PAIN: Primary | ICD-10-CM

## 2024-05-28 PROCEDURE — 97110 THERAPEUTIC EXERCISES: CPT | Performed by: PHYSICAL THERAPIST

## 2024-05-28 PROCEDURE — 97140 MANUAL THERAPY 1/> REGIONS: CPT | Performed by: PHYSICAL THERAPIST

## 2024-05-28 PROCEDURE — 97112 NEUROMUSCULAR REEDUCATION: CPT | Performed by: PHYSICAL THERAPIST

## 2024-05-29 NOTE — PROGRESS NOTES
Daily Note     Today's date: 2024  Patient name: Bonita Candelario  : 1955  MRN: [id number]  Referring provider: Rhonda Bain DO  Dx:   Encounter Diagnosis     ICD-10-CM    1. Neck pain  M54.2       2. Mid back pain  M54.9                      Subjective:  Bonita reports doing well,       Objective: See treatment diary below      Assessment: Tolerated treatment well. Patient  continues to benefit from mobilizations and mobility exercises focusing on thoracic extension      Plan: Continue per plan of care.      Precautions: None      Manuals             Prone PA thoracic AF            Prone 1st rib inferior glide R  AF            Supine mid thoracic HVLAT AF                         Neuro Re-Ed                                                                                                        Ther Ex             Thoracic extension 20 X 3 sec            Self inferior 1st rib glide 20 with SB and ROT            Seated hip ER stretch  At rail  30 sec  X 3               TB row Green  30            Thoracic rotation  Vs wall  20                                                   Ther Activity                                       Gait Training                                       Modalities             Traction  Cervical  20#  10 min                             3

## 2024-05-31 ENCOUNTER — OFFICE VISIT (OUTPATIENT)
Dept: PHYSICAL THERAPY | Facility: MEDICAL CENTER | Age: 69
End: 2024-05-31
Payer: COMMERCIAL

## 2024-05-31 DIAGNOSIS — M54.9 MID BACK PAIN: ICD-10-CM

## 2024-05-31 DIAGNOSIS — M54.2 NECK PAIN: Primary | ICD-10-CM

## 2024-05-31 PROCEDURE — 97110 THERAPEUTIC EXERCISES: CPT | Performed by: PHYSICAL THERAPIST

## 2024-05-31 PROCEDURE — 97140 MANUAL THERAPY 1/> REGIONS: CPT | Performed by: PHYSICAL THERAPIST

## 2024-05-31 PROCEDURE — 97112 NEUROMUSCULAR REEDUCATION: CPT | Performed by: PHYSICAL THERAPIST

## 2024-05-31 NOTE — PROGRESS NOTES
Daily Note     Today's date: 2024  Patient name: Bonita Candelario  : 1955  MRN: [id number]  Referring provider: Rhonda Bain DO  Dx:   Encounter Diagnosis     ICD-10-CM    1. Neck pain  M54.2       2. Mid back pain  M54.9                      Subjective:  Bonita reports doing well,       Objective: See treatment diary below      Assessment: Tolerated treatment well. Patient  continues to benefit from mobilizations and mobility exercises focusing on thoracic extension      Plan: Continue per plan of care.      Precautions: None      Manuals             Prone PA thoracic AF            Prone 1st rib inferior glide R  AF            Supine mid thoracic HVLAT AF                         Neuro Re-Ed                                                                                                        Ther Ex             Thoracic extension 20 X 3 sec            Self inferior 1st rib glide 20 with SB and ROT            Seated hip ER stretch  At rail  30 sec  X 3               TB row Green  30            Thoracic rotation  Vs wall  20                                                   Ther Activity                                       Gait Training                                       Modalities             Traction  Cervical  20#  10 min                             3

## 2024-06-03 ENCOUNTER — OFFICE VISIT (OUTPATIENT)
Dept: PHYSICAL THERAPY | Facility: MEDICAL CENTER | Age: 69
End: 2024-06-03
Payer: COMMERCIAL

## 2024-06-03 DIAGNOSIS — M54.2 NECK PAIN: Primary | ICD-10-CM

## 2024-06-03 DIAGNOSIS — M54.9 MID BACK PAIN: ICD-10-CM

## 2024-06-03 PROCEDURE — 97110 THERAPEUTIC EXERCISES: CPT | Performed by: PHYSICAL THERAPIST

## 2024-06-03 PROCEDURE — 97112 NEUROMUSCULAR REEDUCATION: CPT | Performed by: PHYSICAL THERAPIST

## 2024-06-03 PROCEDURE — 97140 MANUAL THERAPY 1/> REGIONS: CPT | Performed by: PHYSICAL THERAPIST

## 2024-06-03 NOTE — PROGRESS NOTES
Daily Note     Today's date: 6/3/2024  Patient name: Bonita Candelario  : 1955  MRN: [id number]  Referring provider: Rhonda Bain DO  Dx:   Encounter Diagnosis     ICD-10-CM    1. Neck pain  M54.2       2. Mid back pain  M54.9                      Subjective:  Bonita reports doing well,       Objective: See treatment diary below      Assessment: Tolerated treatment well. Patient  continues to benefit from mobilizations and mobility exercises focusing on thoracic extension      Plan: Continue per plan of care.      Precautions: None      Manuals /3            Prone PA thoracic AF            Prone 1st rib inferior glide R  AF            Supine mid thoracic HVLAT AF                         Neuro Re-Ed                                                                                                        Ther Ex             Thoracic extension 20 X 3 sec            Self inferior 1st rib glide 20 with SB and ROT            Seated hip ER stretch  At rail  30 sec  X 3               TB row Green  30            Thoracic rotation  Vs wall  20                                                   Ther Activity                                       Gait Training                                       Modalities             Traction  Cervical  20#  10 min                             3

## 2024-06-05 ENCOUNTER — OFFICE VISIT (OUTPATIENT)
Dept: PHYSICAL THERAPY | Facility: MEDICAL CENTER | Age: 69
End: 2024-06-05
Payer: COMMERCIAL

## 2024-06-05 DIAGNOSIS — M54.2 NECK PAIN: ICD-10-CM

## 2024-06-05 DIAGNOSIS — M54.9 MID BACK PAIN: Primary | ICD-10-CM

## 2024-06-05 PROCEDURE — 97140 MANUAL THERAPY 1/> REGIONS: CPT | Performed by: PHYSICAL THERAPIST

## 2024-06-05 PROCEDURE — 97112 NEUROMUSCULAR REEDUCATION: CPT | Performed by: PHYSICAL THERAPIST

## 2024-06-05 PROCEDURE — 97110 THERAPEUTIC EXERCISES: CPT | Performed by: PHYSICAL THERAPIST

## 2024-06-05 NOTE — PROGRESS NOTES
Daily Note     Today's date: 2024  Patient name: Bonita Candelario  : 1955  MRN: [id number]  Referring provider: Rhonda Bain DO  Dx:   Encounter Diagnosis     ICD-10-CM    1. Mid back pain  M54.9       2. Neck pain  M54.2                      Subjective:  Bonita reports doing well,       Objective: See treatment diary below      Assessment: Tolerated treatment well. Patient  continues to benefit from mobilizations and mobility exercises focusing on thoracic extension      Plan: Continue per plan of care.      Precautions: None      Manuals             Prone PA thoracic AF            Prone 1st rib inferior glide R  AF            Supine mid thoracic HVLAT AF                         Neuro Re-Ed                                                                                                        Ther Ex             Thoracic extension 20 X 3 sec            Self inferior 1st rib glide 20 with SB and ROT            Seated hip ER stretch  At rail  30 sec  X 3               TB row Green  30            Thoracic rotation  Vs wall  20                                                   Ther Activity                                       Gait Training                                       Modalities             Traction  Cervical  20#  10 min                             3

## 2024-06-14 PROBLEM — H61.21 IMPACTED CERUMEN OF RIGHT EAR: Status: RESOLVED | Noted: 2023-05-10 | Resolved: 2024-06-14

## 2024-06-17 ENCOUNTER — APPOINTMENT (OUTPATIENT)
Dept: PHYSICAL THERAPY | Facility: MEDICAL CENTER | Age: 69
End: 2024-06-17
Payer: COMMERCIAL

## 2024-06-19 ENCOUNTER — OFFICE VISIT (OUTPATIENT)
Dept: PHYSICAL THERAPY | Facility: MEDICAL CENTER | Age: 69
End: 2024-06-19
Payer: COMMERCIAL

## 2024-06-19 DIAGNOSIS — M54.2 NECK PAIN: ICD-10-CM

## 2024-06-19 DIAGNOSIS — M54.9 MID BACK PAIN: Primary | ICD-10-CM

## 2024-06-19 PROCEDURE — 97110 THERAPEUTIC EXERCISES: CPT | Performed by: PHYSICAL THERAPIST

## 2024-06-19 PROCEDURE — 97112 NEUROMUSCULAR REEDUCATION: CPT | Performed by: PHYSICAL THERAPIST

## 2024-06-19 PROCEDURE — 97140 MANUAL THERAPY 1/> REGIONS: CPT | Performed by: PHYSICAL THERAPIST

## 2024-06-19 NOTE — PROGRESS NOTES
Daily Note     Today's date: 2024  Patient name: Bonita Candelario  : 1955  MRN: [id number]  Referring provider: Rhonda Bain DO  Dx:   Encounter Diagnosis     ICD-10-CM    1. Mid back pain  M54.9       2. Neck pain  M54.2                      Subjective:  Bonita reports doing well,       Objective: See treatment diary below      Assessment: Tolerated treatment well. Patient  continues to benefit from mobilizations and mobility exercises focusing on thoracic extension      Plan: Continue per plan of care.      Precautions: None      Manuals             Prone PA thoracic AF            Prone 1st rib inferior glide R  AF            Supine mid thoracic HVLAT AF                         Neuro Re-Ed                                                                                                        Ther Ex             Thoracic extension 20 X 3 sec            Self inferior 1st rib glide 20 with SB and ROT            Seated hip ER stretch  At rail  30 sec  X 3               TB row Green  30            Thoracic rotation  Vs wall  20                                                   Ther Activity                                       Gait Training                                       Modalities             Traction  Cervical  20#  10 min                             3

## 2024-06-21 ENCOUNTER — OFFICE VISIT (OUTPATIENT)
Dept: PHYSICAL THERAPY | Facility: MEDICAL CENTER | Age: 69
End: 2024-06-21
Payer: COMMERCIAL

## 2024-06-21 DIAGNOSIS — M54.2 NECK PAIN: ICD-10-CM

## 2024-06-21 DIAGNOSIS — M54.9 MID BACK PAIN: Primary | ICD-10-CM

## 2024-06-21 PROCEDURE — 97140 MANUAL THERAPY 1/> REGIONS: CPT | Performed by: PHYSICAL THERAPIST

## 2024-06-21 PROCEDURE — 97110 THERAPEUTIC EXERCISES: CPT | Performed by: PHYSICAL THERAPIST

## 2024-06-21 PROCEDURE — 97112 NEUROMUSCULAR REEDUCATION: CPT | Performed by: PHYSICAL THERAPIST

## 2024-06-21 NOTE — PROGRESS NOTES
Daily Note     Today's date: 2024  Patient name: Bonita Candelario  : 1955  MRN: [id number]  Referring provider: Rhonda Bain DO  Dx:   Encounter Diagnosis     ICD-10-CM    1. Mid back pain  M54.9       2. Neck pain  M54.2                      Subjective:  Bonita reports doing well,       Objective: See treatment diary below      Assessment: Tolerated treatment well. Patient  continues to benefit from mobilizations and mobility exercises focusing on thoracic extension    She notes overall improvement in her range of motion for rotation, still with trigger point in R UT but only bothersome during direct pressure     Plan: Continue per plan of care.      Precautions: None      Manuals             Prone PA thoracic AF            Prone 1st rib inferior glide R  AF            Supine mid thoracic HVLAT AF                         Neuro Re-Ed                                                                                                        Ther Ex             Thoracic extension 20 X 3 sec            Self inferior 1st rib glide 20 with SB and ROT            Seated hip ER stretch  At rail  30 sec  X 3               TB row Green  30            Thoracic rotation  Vs wall  20                                                   Ther Activity                                       Gait Training                                       Modalities             Traction  Cervical  20#  10 min                             3

## 2024-06-24 ENCOUNTER — APPOINTMENT (OUTPATIENT)
Dept: PHYSICAL THERAPY | Facility: MEDICAL CENTER | Age: 69
End: 2024-06-24
Payer: COMMERCIAL

## 2024-06-26 ENCOUNTER — OFFICE VISIT (OUTPATIENT)
Dept: PHYSICAL THERAPY | Facility: MEDICAL CENTER | Age: 69
End: 2024-06-26
Payer: COMMERCIAL

## 2024-06-26 DIAGNOSIS — M54.9 MID BACK PAIN: Primary | ICD-10-CM

## 2024-06-26 DIAGNOSIS — M54.2 NECK PAIN: ICD-10-CM

## 2024-06-26 PROCEDURE — 97140 MANUAL THERAPY 1/> REGIONS: CPT | Performed by: PHYSICAL THERAPIST

## 2024-06-26 PROCEDURE — 97110 THERAPEUTIC EXERCISES: CPT | Performed by: PHYSICAL THERAPIST

## 2024-06-26 PROCEDURE — 97112 NEUROMUSCULAR REEDUCATION: CPT | Performed by: PHYSICAL THERAPIST

## 2024-06-26 NOTE — PROGRESS NOTES
Daily Note     Today's date: 2024  Patient name: Bontia Candelario  : 1955  MRN: [id number]  Referring provider: Rhonda Bain DO  Dx:   Encounter Diagnosis     ICD-10-CM    1. Mid back pain  M54.9       2. Neck pain  M54.2                      Subjective:  Bonita reports doing well,       Objective: See treatment diary below      Assessment: Tolerated treatment well. Patient  continues to benefit from mobilizations and mobility exercises focusing on thoracic extension    She notes overall improvement in her range of motion for rotation, still with trigger point in R UT but only bothersome during direct pressure     Plan: Continue per plan of care.      Precautions: None      Manuals             Prone PA thoracic AF            Prone 1st rib inferior glide R  AF            Supine mid thoracic HVLAT AF                         Neuro Re-Ed                                                                                                        Ther Ex             Thoracic extension 20 X 3 sec            Self inferior 1st rib glide 20 with SB and ROT            Seated hip ER stretch  At rail  30 sec  X 3               TB row Green  30            Thoracic rotation  Vs wall  20                                                   Ther Activity                                       Gait Training                                       Modalities             Traction  Cervical  20#  10 min                             3   Pt w above dx, admitted for further eval/mgmt.

## 2024-07-01 ENCOUNTER — OFFICE VISIT (OUTPATIENT)
Dept: PHYSICAL THERAPY | Facility: MEDICAL CENTER | Age: 69
End: 2024-07-01
Payer: COMMERCIAL

## 2024-07-01 DIAGNOSIS — M54.2 NECK PAIN: ICD-10-CM

## 2024-07-01 DIAGNOSIS — M54.9 MID BACK PAIN: Primary | ICD-10-CM

## 2024-07-01 PROCEDURE — 97112 NEUROMUSCULAR REEDUCATION: CPT | Performed by: PHYSICAL THERAPIST

## 2024-07-01 PROCEDURE — 97110 THERAPEUTIC EXERCISES: CPT | Performed by: PHYSICAL THERAPIST

## 2024-07-01 PROCEDURE — 97140 MANUAL THERAPY 1/> REGIONS: CPT | Performed by: PHYSICAL THERAPIST

## 2024-07-01 NOTE — PROGRESS NOTES
Daily Note     Today's date: 2024  Patient name: Bonita Candelario  : 1955  MRN: [id number]  Referring provider: Rhonda Bain DO  Dx:   Encounter Diagnosis     ICD-10-CM    1. Mid back pain  M54.9       2. Neck pain  M54.2                      Subjective:  Bonita reports doing well,       Objective: See treatment diary below      Assessment: Tolerated treatment well. Patient  continues to benefit from mobilizations and mobility exercises focusing on thoracic extension    She notes overall improvement in her range of motion for rotation, still with trigger point in R UT but only bothersome during direct pressure     Plan: Continue per plan of care.      Precautions: None      Manuals             Prone PA thoracic AF            Prone 1st rib inferior glide R  AF            Supine mid thoracic HVLAT AF                         Neuro Re-Ed                                                                                                        Ther Ex             Thoracic extension 20 X 3 sec            Self inferior 1st rib glide 20 with SB and ROT            Seated hip ER stretch  At rail  30 sec  X 3               TB row Green  30            Thoracic rotation  Vs wall  20                                                   Ther Activity                                       Gait Training                                       Modalities             Traction  Cervical  20#  10 min                             3

## 2024-07-03 ENCOUNTER — OFFICE VISIT (OUTPATIENT)
Dept: PHYSICAL THERAPY | Facility: MEDICAL CENTER | Age: 69
End: 2024-07-03
Payer: COMMERCIAL

## 2024-07-03 DIAGNOSIS — M54.9 MID BACK PAIN: Primary | ICD-10-CM

## 2024-07-03 DIAGNOSIS — M54.2 NECK PAIN: ICD-10-CM

## 2024-07-03 PROCEDURE — 97112 NEUROMUSCULAR REEDUCATION: CPT | Performed by: PHYSICAL THERAPIST

## 2024-07-03 PROCEDURE — 97110 THERAPEUTIC EXERCISES: CPT | Performed by: PHYSICAL THERAPIST

## 2024-07-03 PROCEDURE — 97140 MANUAL THERAPY 1/> REGIONS: CPT | Performed by: PHYSICAL THERAPIST

## 2024-07-03 NOTE — PROGRESS NOTES
Daily Note     Today's date: 7/3/2024  Patient name: Bonita Candelario  : 1955  MRN: [id number]  Referring provider: Rhonda Bain DO  Dx:   Encounter Diagnosis     ICD-10-CM    1. Mid back pain  M54.9       2. Neck pain  M54.2                      Subjective:  Bonita reports doing well,       Objective: See treatment diary below      Assessment: Tolerated treatment well. Patient  continues to benefit from mobilizations and mobility exercises focusing on thoracic extension    She notes overall improvement in her range of motion for rotation, still with trigger point in R UT but only bothersome during direct pressure     Likely continue one more week, solidify HEP and DC at that time     Plan: Continue per plan of care.      Precautions: None      Manuals 7/3            Prone PA thoracic AF            Prone 1st rib inferior glide R  AF            Supine mid thoracic HVLAT AF                         Neuro Re-Ed                                                                                                        Ther Ex             Thoracic extension 20 X 3 sec            Self inferior 1st rib glide 20 with SB and ROT            Seated hip ER stretch  At rail  30 sec  X 3               TB row Green  30            Thoracic rotation  Vs wall  20                                                   Ther Activity                                       Gait Training                                       Modalities             Traction  Cervical  20#  10 min                             3

## 2024-07-08 ENCOUNTER — OFFICE VISIT (OUTPATIENT)
Dept: PHYSICAL THERAPY | Facility: MEDICAL CENTER | Age: 69
End: 2024-07-08
Payer: COMMERCIAL

## 2024-07-08 DIAGNOSIS — M54.9 MID BACK PAIN: Primary | ICD-10-CM

## 2024-07-08 DIAGNOSIS — M54.2 NECK PAIN: ICD-10-CM

## 2024-07-08 PROCEDURE — 97140 MANUAL THERAPY 1/> REGIONS: CPT | Performed by: PHYSICAL THERAPIST

## 2024-07-08 PROCEDURE — 97110 THERAPEUTIC EXERCISES: CPT | Performed by: PHYSICAL THERAPIST

## 2024-07-08 PROCEDURE — 97112 NEUROMUSCULAR REEDUCATION: CPT | Performed by: PHYSICAL THERAPIST

## 2024-07-08 NOTE — PROGRESS NOTES
Daily Note     Today's date: 2024  Patient name: Bonita Candelario  : 1955  MRN: [id number]  Referring provider: Rhonda Bain DO  Dx:   Encounter Diagnosis     ICD-10-CM    1. Mid back pain  M54.9       2. Neck pain  M54.2                      Subjective:  Bonita reports doing well,       Objective: See treatment diary below      Assessment: Tolerated treatment well. Patient  continues to benefit from mobilizations and mobility exercises focusing on thoracic extension    She notes overall improvement in her range of motion for rotation, still with trigger point in R UT but only bothersome during direct pressure     Likely continue one more week, solidify HEP and DC at that time     Plan: Continue per plan of care.      Precautions: None      Manuals             Prone PA thoracic AF            Prone 1st rib inferior glide R  AF            Supine mid thoracic HVLAT AF                         Neuro Re-Ed                                                                                                        Ther Ex             Thoracic extension 20 X 3 sec            Self inferior 1st rib glide 20 with SB and ROT            Seated hip ER stretch  At rail  30 sec  X 3               TB row Green  30            Thoracic rotation  Vs wall  20                                                   Ther Activity                                       Gait Training                                       Modalities             Traction  Cervical  20#  10 min                             3

## 2024-07-10 ENCOUNTER — OFFICE VISIT (OUTPATIENT)
Dept: PHYSICAL THERAPY | Facility: MEDICAL CENTER | Age: 69
End: 2024-07-10
Payer: COMMERCIAL

## 2024-07-10 DIAGNOSIS — M54.2 NECK PAIN: ICD-10-CM

## 2024-07-10 DIAGNOSIS — M54.9 MID BACK PAIN: Primary | ICD-10-CM

## 2024-07-10 PROCEDURE — 97112 NEUROMUSCULAR REEDUCATION: CPT | Performed by: PHYSICAL THERAPIST

## 2024-07-10 PROCEDURE — 97110 THERAPEUTIC EXERCISES: CPT | Performed by: PHYSICAL THERAPIST

## 2024-07-10 PROCEDURE — 97140 MANUAL THERAPY 1/> REGIONS: CPT | Performed by: PHYSICAL THERAPIST

## 2024-07-10 NOTE — PROGRESS NOTES
Daily Note     Today's date: 7/10/2024  Patient name: Bonita Candelario  : 1955  MRN: [id number]  Referring provider: Rhonda Bain DO  Dx:   Encounter Diagnosis     ICD-10-CM    1. Mid back pain  M54.9       2. Neck pain  M54.2                      Subjective:  Bonita reports doing well,       Objective: See treatment diary below      Assessment: Tolerated treatment well. Patient  continues to benefit from mobilizations and mobility exercises focusing on thoracic extension    She notes overall improvement in her range of motion for rotation, still with trigger point in R UT but only bothersome during direct pressure      She has reached plateau in treatment and will continue with HEP to maintain improvements     Plan: Continue per plan of care.      Precautions: None      Manuals 7/10            Prone PA thoracic AF            Prone 1st rib inferior glide R  AF            Supine mid thoracic HVLAT AF                         Neuro Re-Ed                                                                                                        Ther Ex             Thoracic extension 20 X 3 sec            Self inferior 1st rib glide 20 with SB and ROT            Seated hip ER stretch  At rail  30 sec  X 3               TB row Green  30            Thoracic rotation  Vs wall  20                                                   Ther Activity                                       Gait Training                                       Modalities             Traction  Cervical  20#  10 min                             3

## 2024-07-13 DIAGNOSIS — E03.9 ACQUIRED HYPOTHYROIDISM: ICD-10-CM

## 2024-07-13 RX ORDER — LEVOTHYROXINE SODIUM 0.07 MG/1
75 TABLET ORAL DAILY
Qty: 100 TABLET | Refills: 1 | Status: SHIPPED | OUTPATIENT
Start: 2024-07-13

## 2024-07-18 ENCOUNTER — HOSPITAL ENCOUNTER (OUTPATIENT)
Dept: MAMMOGRAPHY | Facility: MEDICAL CENTER | Age: 69
Discharge: HOME | End: 2024-07-18
Payer: COMMERCIAL

## 2024-07-18 VITALS — HEIGHT: 62 IN | BODY MASS INDEX: 24.29 KG/M2 | WEIGHT: 132 LBS

## 2024-07-18 DIAGNOSIS — Z12.31 VISIT FOR SCREENING MAMMOGRAM: ICD-10-CM

## 2024-07-18 PROCEDURE — 77063 BREAST TOMOSYNTHESIS BI: CPT

## 2024-07-18 PROCEDURE — 77067 SCR MAMMO BI INCL CAD: CPT

## 2024-07-26 ENCOUNTER — TELEPHONE (OUTPATIENT)
Dept: FAMILY MEDICINE CLINIC | Facility: CLINIC | Age: 69
End: 2024-07-26

## 2024-07-26 NOTE — TELEPHONE ENCOUNTER
----- Message from Salinsa Andersen MD sent at 7/25/2024 10:21 AM EDT -----  Mammogram okay.  Repeat 1 year.

## 2024-07-29 ENCOUNTER — TELEPHONE (OUTPATIENT)
Dept: FAMILY MEDICINE CLINIC | Facility: CLINIC | Age: 69
End: 2024-07-29

## 2024-08-21 ENCOUNTER — TELEPHONE (OUTPATIENT)
Age: 69
End: 2024-08-21

## 2024-08-21 NOTE — TELEPHONE ENCOUNTER
Patient tested positive for Covid today and is requesting a script for Paxlovid sent to [id number] - [place], [place] - [address] [[id number]].    Please contact patient when sent or if there's a problem filling this request.

## 2024-08-22 ENCOUNTER — OFFICE VISIT (OUTPATIENT)
Dept: FAMILY MEDICINE CLINIC | Facility: CLINIC | Age: 69
End: 2024-08-22
Payer: COMMERCIAL

## 2024-08-22 VITALS
DIASTOLIC BLOOD PRESSURE: 64 MMHG | HEART RATE: 67 BPM | SYSTOLIC BLOOD PRESSURE: 116 MMHG | TEMPERATURE: 99 F | OXYGEN SATURATION: 99 % | RESPIRATION RATE: 16 BRPM | BODY MASS INDEX: 24.95 KG/M2 | WEIGHT: 136.4 LBS

## 2024-08-22 DIAGNOSIS — U07.1 COVID-19 VIRUS INFECTION: Primary | ICD-10-CM

## 2024-08-22 PROCEDURE — 99214 OFFICE O/P EST MOD 30 MIN: CPT | Performed by: FAMILY MEDICINE

## 2024-08-22 PROCEDURE — G2211 COMPLEX E/M VISIT ADD ON: HCPCS | Performed by: FAMILY MEDICINE

## 2024-08-22 RX ORDER — NIRMATRELVIR AND RITONAVIR 300-100 MG
3 KIT ORAL 2 TIMES DAILY
Qty: 30 TABLET | Refills: 0 | Status: SHIPPED | OUTPATIENT
Start: 2024-08-22 | End: 2024-08-27

## 2024-08-22 NOTE — PROGRESS NOTES
FAMILY PRACTICE OFFICE VISIT    NAME: Bonita Candelario    AGE: 68 y.o.   SEX: female  : 1955   MRN: [id number]    DATE: 2024  TIME: 11:18 AM    Assessment and Plan   1. COVID-19 virus infection  Recommendation to increase fluids - particularly water, rest, saline nasal spray and humidified air  Paxlovid  Discussed possible side effects  Pt has taken in past and requests to take again as it worked well for her.    - nirmatrelvir & ritonavir (Paxlovid, 300/100,) tablet therapy pack; Take 3 tablets by mouth 2 (two) times a day for 5 days Take 2 nirmatrelvir tablets + 1 ritonavir tablet together per dose  Dispense: 30 tablet; Refill: 0      Chief Complaint     Chief Complaint   Patient presents with    COVID-19     Test + for covid yesterday and started with symptoms Tuesday. She started with scratchy throat and headache, coughing, not much congestion. Her cough is productive. No fevers, slight sore throat.        History of Present Illness   Bonita Candelario is a 68 y.o.-year-old female who presnts today with her mother with covid infection  Symptoms began X 2 days ago  And then tested postive at home X 1 day ago  Using otc cold and flu preps with some relief.      Review of Systems   Review of Systems   Constitutional:  Positive for chills, diaphoresis and fatigue. Negative for fever.   HENT:  Positive for congestion, sinus pressure, sinus pain and sore throat.         No loss of taste or smell   Respiratory:  Positive for cough. Negative for shortness of breath and wheezing.         Slight cough  No asthma  Nonsmoker     Cardiovascular:  Negative for chest pain and palpitations.   Gastrointestinal:  Negative for abdominal pain, constipation, diarrhea, nausea and vomiting.   Genitourinary:         Drinking fluids.     Neurological:  Positive for headaches.       Active Problem List     Patient Active Problem List   Diagnosis    Chronic constipation    Hypercholesterolemia    Acquired hypothyroidism    Vitamin  D deficiency    Chronic neck pain    Osteopenia of left hip    Pelvic floor dysfunction    Atrophic vaginitis    Hyperglycemia    Esophageal dysphagia    Chronic pain of left ankle    Gross hematuria    Tendinitis of right rotator cuff    Chronic pain of right wrist    Incomplete uterovaginal prolapse    Stress incontinence    Rectocele    Cystocele, midline    Hypermobility of urethra    Other female genital prolapse    Pelvic muscle wasting    Cancer of the skin, basal cell    Chronic bilateral low back pain without sciatica         Past Medical History:  Past Medical History:   Diagnosis Date    Anxiety     last assessed 12/1/14, per pt resolved 12/27/23    Closed displaced fracture of proximal phalanx of lesser toe of right foot 09/11/2020    Disease of thyroid gland     Hypothyroidism    Headache syndrome 05/12/2017    Hematuria     last assessed 4/23/15, resolved 2/1/16    Left breast mass 12/18/2019     Questionable mass on exam 2019 May be patch of more cystic breast tissue, not well defined. Will check dx mammo  Mammogram was normal 9/20    PONV (postoperative nausea and vomiting)     Skin neoplasm     last assessed 9/18/15, resolved 2/1/16    Uterine polyp     Uterine prolapse     pelvic repair w/ PVS  today  1/8/2024       Past Surgical History:  Past Surgical History:   Procedure Laterality Date    COLONOSCOPY      DILATION AND CURETTAGE OF UTERUS N/A 1/8/2024    Procedure: (D&C);  Surgeon: Akash Diaz MD;  Location: [facility];  Service: UroGynecology           HYSTEROSCOPY WITH  RESECTION UTERINE TUMOR/FIBROID  03/2020    AK CMBND ANTERPOST COLPORRAPHY W/CYSTO N/A 1/8/2024    Procedure: A&P COLPORRHAPHY;  Surgeon: Akash Diaz MD;  [facility];  Service: UroGynecology           AK COLPOPEXY VAGINAL EXTRAPERITONEAL APPROACH N/A 1/8/2024    Procedure: COLPOSUSPENSION VAGINAL EXTRAPERITONEAL(ENPLACE);  Surgeon: Akash Diaz MD;  [facility];  Service: UroGynecology            AK CYSTOURETHROSCOPY N/A 2024    Procedure: CYSTO;  Surgeon: Akash Diaz MD;  [facility];  Service: UroGynecology           AK HYSTEROSCOPY DIAGNOSTIC SEPARATE PROCEDURE N/A 2024    Procedure: HYSTEROSCOPY;  Surgeon: Akash Diaz MD;  [facility];  Service: UroGynecology           AK SLING OPERATION STRESS INCONTINENCE N/A 2024    Procedure: PV SLING;  Surgeon: Akash Diaz MD;  [facility];  Service: UroGynecology           SHOULDER SURGERY Left 2009    tear repair    TOOTH EXTRACTION         Family History:  Family History   Problem Relation Age of Onset    Coronary artery disease Mother     Hyperlipidemia Mother     Cancer Father         of spine    Stroke Father         syndrome     No Known Problems Sister     No Known Problems Maternal Grandmother     No Known Problems Maternal Grandfather     No Known Problems Paternal Grandmother     No Known Problems Paternal Grandfather     No Known Problems Brother     Alcohol abuse Son     Breast cancer Maternal Aunt 65    No Known Problems Maternal Aunt     Anesthesia problems Neg Hx        Social History:  Social History     Socioeconomic History    Marital status:      Spouse name: Not on file    Number of children: Not on file    Years of education: Not on file    Highest education level: Not on file   Occupational History    Not on file   Tobacco Use    Smoking status: Former     Current packs/day: 0.00     Types: Cigarettes     Quit date: 1990     Years since quittin.6    Smokeless tobacco: Never    Tobacco comments:     Former cig use for approx 20 yrs - quit 25 yrs ago   Vaping Use    Vaping status: Never Used   Substance and Sexual Activity    Alcohol use: Yes     Alcohol/week: 1.0 standard drink of alcohol     Types: 1 Glasses of wine per week     Comment: 1 x daily    Drug use: No     Comment: Denies any drug use per pt    Sexual activity: Yes     Partners: Male     Comment: Denies any chest  pain or shortness of breath with activity   Other Topics Concern    Not on file   Social History Narrative    Not on file     Social Determinants of Health     Financial Resource Strain: Low Risk  (5/10/2023)    Overall Financial Resource Strain (CARDIA)     Difficulty of Paying Living Expenses: Not hard at all   Food Insecurity: No Food Insecurity (5/15/2024)    Hunger Vital Sign     Worried About Running Out of Food in the Last Year: Never true     Ran Out of Food in the Last Year: Never true   Transportation Needs: No Transportation Needs (5/15/2024)    PRAPARE - Transportation     Lack of Transportation (Medical): No     Lack of Transportation (Non-Medical): No   Physical Activity: Not on file   Stress: Not on file   Social Connections: Not on file   Intimate Partner Violence: Not on file   Housing Stability: Low Risk  (5/15/2024)    Housing Stability Vital Sign     Unable to Pay for Housing in the Last Year: No     Number of Times Moved in the Last Year: 1     Homeless in the Last Year: No       Objective     Vitals:    08/22/24 1110   BP: 116/64   Pulse: 67   Resp: 16   Temp: 99 °F (37.2 °C)   SpO2: 99%     Wt Readings from Last 3 Encounters:   08/22/24 61.9 kg (136 lb 6.4 oz)   07/18/24 59.9 kg (132 lb)   05/31/24 59.9 kg (132 lb)       Physical Exam  Vitals and nursing note reviewed.   Constitutional:       General: She is not in acute distress.     Appearance: She is not toxic-appearing.      Comments: Nontoxic  Good facial coloring  Sounds mildly congested     HENT:      Right Ear: Tympanic membrane normal.      Left Ear: Tympanic membrane normal.      Nose: Nose normal.      Mouth/Throat:      Mouth: Mucous membranes are moist.      Pharynx: No oropharyngeal exudate or posterior oropharyngeal erythema.      Comments: Mucous membranes moist and pink        Cardiovascular:      Rate and Rhythm: Normal rate and regular rhythm.      Heart sounds: Normal heart sounds. No murmur heard.  Pulmonary:      Effort:  "Pulmonary effort is normal. No respiratory distress.      Breath sounds: Normal breath sounds. No wheezing, rhonchi or rales.      Comments: No use of accessory respiratory muscles      Lymphadenopathy:      Cervical: No cervical adenopathy.   Skin:     General: Skin is warm.      Coloration: Skin is not jaundiced.   Neurological:      General: No focal deficit present.      Mental Status: She is alert and oriented to person, place, and time.   Psychiatric:         Mood and Affect: Mood normal.         Behavior: Behavior normal.         Thought Content: Thought content normal.         Judgment: Judgment normal.         Pertinent Laboratory/Diagnostic Studies:  Lab Results   Component Value Date    GLUCOSE 98 12/03/2015    BUN 15 05/13/2024    CREATININE 0.51 (L) 05/13/2024    CALCIUM 9.5 05/13/2024     12/03/2015    K 4.4 05/13/2024    CO2 27 05/13/2024     05/13/2024     Lab Results   Component Value Date    ALT 22 05/13/2024    AST 24 05/13/2024    ALKPHOS 48 05/13/2024    BILITOT 0.77 12/03/2015       Lab Results   Component Value Date    WBC 4.37 05/13/2024    HGB 13.2 05/13/2024    HCT 40.5 05/13/2024    MCV 97 05/13/2024     05/13/2024       No results found for: \"TSH\"    Lab Results   Component Value Date    CHOL 227 12/03/2015     Lab Results   Component Value Date    TRIG 132 05/13/2024     Lab Results   Component Value Date    HDL 56 05/13/2024     Lab Results   Component Value Date    LDLCALC 136 (H) 05/13/2024     Lab Results   Component Value Date    HGBA1C 5.5 05/13/2024       Results for orders placed or performed in visit on 05/13/24   CBC and differential   Result Value Ref Range    WBC 4.37 4.31 - 10.16 Thousand/uL    RBC 4.18 3.81 - 5.12 Million/uL    Hemoglobin 13.2 11.5 - 15.4 g/dL    Hematocrit 40.5 34.8 - 46.1 %    MCV 97 82 - 98 fL    MCH 31.6 26.8 - 34.3 pg    MCHC 32.6 31.4 - 37.4 g/dL    RDW 12.3 11.6 - 15.1 %    MPV 10.3 8.9 - 12.7 fL    Platelets 262 149 - 390 " Thousands/uL    nRBC 0 /100 WBCs    Segmented % 43 43 - 75 %    Immature Grans % 1 0 - 2 %    Lymphocytes % 44 14 - 44 %    Monocytes % 8 4 - 12 %    Eosinophils Relative 3 0 - 6 %    Basophils Relative 1 0 - 1 %    Absolute Neutrophils 1.92 1.85 - 7.62 Thousands/µL    Absolute Immature Grans 0.02 0.00 - 0.20 Thousand/uL    Absolute Lymphocytes 1.90 0.60 - 4.47 Thousands/µL    Absolute Monocytes 0.35 0.17 - 1.22 Thousand/µL    Eosinophils Absolute 0.15 0.00 - 0.61 Thousand/µL    Basophils Absolute 0.03 0.00 - 0.10 Thousands/µL   Comprehensive metabolic panel   Result Value Ref Range    Sodium 141 135 - 147 mmol/L    Potassium 4.4 3.5 - 5.3 mmol/L    Chloride 105 96 - 108 mmol/L    CO2 27 21 - 32 mmol/L    ANION GAP 9 4 - 13 mmol/L    BUN 15 5 - 25 mg/dL    Creatinine 0.51 (L) 0.60 - 1.30 mg/dL    Glucose, Fasting 98 65 - 99 mg/dL    Calcium 9.5 8.4 - 10.2 mg/dL    AST 24 13 - 39 U/L    ALT 22 7 - 52 U/L    Alkaline Phosphatase 48 34 - 104 U/L    Total Protein 6.7 6.4 - 8.4 g/dL    Albumin 4.4 3.5 - 5.0 g/dL    Total Bilirubin 0.68 0.20 - 1.00 mg/dL    eGFR 100 ml/min/1.73sq m   Lipid Panel with Direct LDL reflex   Result Value Ref Range    Cholesterol 218 (H) See Comment mg/dL    Triglycerides 132 See Comment mg/dL    HDL, Direct 56 >=50 mg/dL    LDL Calculated 136 (H) 0 - 100 mg/dL   Hemoglobin A1C   Result Value Ref Range    Hemoglobin A1C 5.5 Normal 4.0-5.6%; PreDiabetic 5.7-6.4%; Diabetic >=6.5%; Glycemic control for adults with diabetes <7.0% %     mg/dl   TSH, 3rd generation with Free T4 reflex   Result Value Ref Range    TSH 3RD GENERATON 1.770 0.450 - 4.500 uIU/mL   Vitamin D 25 hydroxy   Result Value Ref Range    Vit D, 25-Hydroxy 38.5 30.0 - 100.0 ng/mL       No orders of the defined types were placed in this encounter.      ALLERGIES:  Allergies   Allergen Reactions    Sulfa Antibiotics Rash       Current Medications     Current Outpatient Medications   Medication Sig Dispense Refill    Calcium  Carbonate-Vitamin D (CALCIUM-D PO) Take by mouth      estrogens, conjugated (Premarin) vaginal cream Insert 0.5 g into the vagina 3 (three) times a week      levothyroxine 75 mcg tablet TAKE 1 TABLET BY MOUTH EVERY  tablet 1    meloxicam (MOBIC) 15 mg tablet Take 1 tablet (15 mg total) by mouth daily as needed for moderate pain 30 tablet 1    Multiple Vitamin (multivitamin) tablet Take 1 tablet by mouth daily       No current facility-administered medications for this visit.         Health Maintenance     Health Maintenance   Topic Date Due    Zoster Vaccine (1 of 2) Never done    RSV Vaccine Age 60+ Years (1 - 1-dose 60+ series) Never done    PT PLAN OF CARE  05/05/2024    Influenza Vaccine (1) 09/01/2024    DXA SCAN  02/02/2025    Fall Risk  05/15/2025    Urinary Incontinence Screening  05/15/2025    Medicare Annual Wellness Visit (AWV)  05/15/2025    Breast Cancer Screening: Mammogram  07/18/2025    Depression Screening  08/22/2025    Colorectal Cancer Screening  05/06/2026    Hepatitis C Screening  Completed    Osteoporosis Screening  Completed    Pneumococcal Vaccine: 65+ Years  Completed    COVID-19 Vaccine  Completed    RSV Vaccine age 0-20 Months  Aged Out    HIB Vaccine  Aged Out    IPV Vaccine  Aged Out    Hepatitis A Vaccine  Aged Out    Meningococcal ACWY Vaccine  Aged Out    HPV Vaccine  Aged Out    Cervical Cancer Screening  Discontinued     Immunization History   Administered Date(s) Administered    COVID-19 MODERNA VACC 0.5 ML IM 10/29/2021, 04/08/2022    COVID-19 Moderna Vac BIVALENT 12 Yr+ IM 0.5 ML 10/24/2022    COVID-19 PFIZER VACCINE 0.3 ML IM 03/15/2021, 04/05/2021    COVID-19 Pfizer mRNA vacc PF sebastian-sucrose 12 yr and older (Comirnaty) 10/20/2023    INFLUENZA 12/07/2010, 09/11/2015, 11/02/2017, 09/28/2020, 11/10/2022    Influenza Quadrivalent Preservative Free 3 years and older IM 11/02/2017    Influenza Quadrivalent, 6-35 Months IM 09/11/2015, 09/22/2016    Influenza, high dose  seasonal 0.7 mL 11/10/2022, 09/18/2023    Influenza, recombinant, quadrivalent,injectable, preservative free 10/24/2018, 10/22/2019, 09/30/2020, 11/19/2021    Pneumococcal Conjugate Vaccine 20-valent (Pcv20), Polysace 05/10/2023    Tdap 02/01/2016          Rhonda Bain, DO

## 2024-09-25 DIAGNOSIS — Z00.6 ENCOUNTER FOR EXAMINATION FOR NORMAL COMPARISON OR CONTROL IN CLINICAL RESEARCH PROGRAM: ICD-10-CM

## 2024-11-11 ENCOUNTER — APPOINTMENT (OUTPATIENT)
Dept: LAB | Facility: MEDICAL CENTER | Age: 69
End: 2024-11-11
Payer: COMMERCIAL

## 2024-11-11 DIAGNOSIS — E55.9 VITAMIN D DEFICIENCY: ICD-10-CM

## 2024-11-11 DIAGNOSIS — R73.9 HYPERGLYCEMIA: ICD-10-CM

## 2024-11-11 DIAGNOSIS — E78.00 HYPERCHOLESTEROLEMIA: ICD-10-CM

## 2024-11-11 DIAGNOSIS — Z00.6 ENCOUNTER FOR EXAMINATION FOR NORMAL COMPARISON OR CONTROL IN CLINICAL RESEARCH PROGRAM: ICD-10-CM

## 2024-11-11 DIAGNOSIS — E03.9 ACQUIRED HYPOTHYROIDISM: ICD-10-CM

## 2024-11-11 LAB
25(OH)D3 SERPL-MCNC: 29.3 NG/ML (ref 30–100)
ALBUMIN SERPL BCG-MCNC: 4.1 G/DL (ref 3.5–5)
ALP SERPL-CCNC: 46 U/L (ref 34–104)
ALT SERPL W P-5'-P-CCNC: 16 U/L (ref 7–52)
ANION GAP SERPL CALCULATED.3IONS-SCNC: 8 MMOL/L (ref 4–13)
AST SERPL W P-5'-P-CCNC: 18 U/L (ref 13–39)
BASOPHILS # BLD AUTO: 0.03 THOUSANDS/ÂΜL (ref 0–0.1)
BASOPHILS NFR BLD AUTO: 1 % (ref 0–1)
BILIRUB SERPL-MCNC: 0.68 MG/DL (ref 0.2–1)
BUN SERPL-MCNC: 15 MG/DL (ref 5–25)
CALCIUM SERPL-MCNC: 9.2 MG/DL (ref 8.4–10.2)
CHLORIDE SERPL-SCNC: 102 MMOL/L (ref 96–108)
CHOLEST SERPL-MCNC: 227 MG/DL
CO2 SERPL-SCNC: 27 MMOL/L (ref 21–32)
CREAT SERPL-MCNC: 0.54 MG/DL (ref 0.6–1.3)
EOSINOPHIL # BLD AUTO: 0.1 THOUSAND/ÂΜL (ref 0–0.61)
EOSINOPHIL NFR BLD AUTO: 3 % (ref 0–6)
ERYTHROCYTE [DISTWIDTH] IN BLOOD BY AUTOMATED COUNT: 12.6 % (ref 11.6–15.1)
EST. AVERAGE GLUCOSE BLD GHB EST-MCNC: 108 MG/DL
GFR SERPL CREATININE-BSD FRML MDRD: 97 ML/MIN/1.73SQ M
GLUCOSE P FAST SERPL-MCNC: 105 MG/DL (ref 65–99)
HBA1C MFR BLD: 5.4 %
HCT VFR BLD AUTO: 39 % (ref 34.8–46.1)
HDLC SERPL-MCNC: 55 MG/DL
HGB BLD-MCNC: 12.6 G/DL (ref 11.5–15.4)
IMM GRANULOCYTES # BLD AUTO: 0 THOUSAND/UL (ref 0–0.2)
IMM GRANULOCYTES NFR BLD AUTO: 0 % (ref 0–2)
LDLC SERPL CALC-MCNC: 137 MG/DL (ref 0–100)
LYMPHOCYTES # BLD AUTO: 1.81 THOUSANDS/ÂΜL (ref 0.6–4.47)
LYMPHOCYTES NFR BLD AUTO: 44 % (ref 14–44)
MCH RBC QN AUTO: 31 PG (ref 26.8–34.3)
MCHC RBC AUTO-ENTMCNC: 32.3 G/DL (ref 31.4–37.4)
MCV RBC AUTO: 96 FL (ref 82–98)
MONOCYTES # BLD AUTO: 0.31 THOUSAND/ÂΜL (ref 0.17–1.22)
MONOCYTES NFR BLD AUTO: 8 % (ref 4–12)
NEUTROPHILS # BLD AUTO: 1.77 THOUSANDS/ÂΜL (ref 1.85–7.62)
NEUTS SEG NFR BLD AUTO: 44 % (ref 43–75)
NRBC BLD AUTO-RTO: 0 /100 WBCS
PLATELET # BLD AUTO: 281 THOUSANDS/UL (ref 149–390)
PMV BLD AUTO: 10.2 FL (ref 8.9–12.7)
POTASSIUM SERPL-SCNC: 4.2 MMOL/L (ref 3.5–5.3)
PROT SERPL-MCNC: 6.6 G/DL (ref 6.4–8.4)
RBC # BLD AUTO: 4.07 MILLION/UL (ref 3.81–5.12)
SODIUM SERPL-SCNC: 137 MMOL/L (ref 135–147)
TRIGL SERPL-MCNC: 174 MG/DL
TSH SERPL DL<=0.05 MIU/L-ACNC: 1.92 UIU/ML (ref 0.45–4.5)
WBC # BLD AUTO: 4.02 THOUSAND/UL (ref 4.31–10.16)

## 2024-11-11 PROCEDURE — 36415 COLL VENOUS BLD VENIPUNCTURE: CPT

## 2024-11-11 PROCEDURE — 80061 LIPID PANEL: CPT

## 2024-11-11 PROCEDURE — 80053 COMPREHEN METABOLIC PANEL: CPT

## 2024-11-11 PROCEDURE — 84443 ASSAY THYROID STIM HORMONE: CPT

## 2024-11-11 PROCEDURE — 83036 HEMOGLOBIN GLYCOSYLATED A1C: CPT

## 2024-11-11 PROCEDURE — 82306 VITAMIN D 25 HYDROXY: CPT

## 2024-11-11 PROCEDURE — 85025 COMPLETE CBC W/AUTO DIFF WBC: CPT

## 2024-11-12 ENCOUNTER — IMMUNIZATIONS (OUTPATIENT)
Dept: FAMILY MEDICINE CLINIC | Facility: CLINIC | Age: 69
End: 2024-11-12
Payer: COMMERCIAL

## 2024-11-12 DIAGNOSIS — Z23 ENCOUNTER FOR IMMUNIZATION: Primary | ICD-10-CM

## 2024-11-12 PROCEDURE — 90662 IIV NO PRSV INCREASED AG IM: CPT

## 2024-11-12 PROCEDURE — G0008 ADMIN INFLUENZA VIRUS VAC: HCPCS

## 2024-11-21 LAB
APOB+LDLR+PCSK9 GENE MUT ANL BLD/T: NOT DETECTED
BRCA1+BRCA2 DEL+DUP + FULL MUT ANL BLD/T: NOT DETECTED
MLH1+MSH2+MSH6+PMS2 GN DEL+DUP+FUL M: NOT DETECTED

## 2024-12-19 ENCOUNTER — RA CDI HCC (OUTPATIENT)
Dept: OTHER | Facility: HOSPITAL | Age: 69
End: 2024-12-19

## 2024-12-19 NOTE — PROGRESS NOTES
HCC coding opportunities       Chart reviewed, no opportunity found: CHART REVIEWED, NO OPPORTUNITY FOUND      This is a reminder to address (resolve/update/assess) ALL HCC (risk adjustment) codes as found on active problem list for 2025 as patient scores reset to zero FLORA.  Patients Insurance     Medicare Insurance: [employer]vant[employer]           Action 3: Continue Detail Level: Zone Continue Regimen: Otezla, betamethasone cream

## 2025-01-03 ENCOUNTER — OFFICE VISIT (OUTPATIENT)
Dept: FAMILY MEDICINE CLINIC | Facility: CLINIC | Age: 70
End: 2025-01-03
Payer: COMMERCIAL

## 2025-01-03 VITALS
WEIGHT: 138 LBS | OXYGEN SATURATION: 100 % | DIASTOLIC BLOOD PRESSURE: 72 MMHG | HEART RATE: 76 BPM | SYSTOLIC BLOOD PRESSURE: 118 MMHG | TEMPERATURE: 97.2 F | BODY MASS INDEX: 25.24 KG/M2

## 2025-01-03 DIAGNOSIS — E78.00 HYPERCHOLESTEROLEMIA: Primary | ICD-10-CM

## 2025-01-03 DIAGNOSIS — Z78.0 POST-MENOPAUSAL: ICD-10-CM

## 2025-01-03 DIAGNOSIS — M70.61 TROCHANTERIC BURSITIS OF BOTH HIPS: ICD-10-CM

## 2025-01-03 DIAGNOSIS — M75.81 TENDINITIS OF RIGHT ROTATOR CUFF: ICD-10-CM

## 2025-01-03 DIAGNOSIS — M70.62 TROCHANTERIC BURSITIS OF BOTH HIPS: ICD-10-CM

## 2025-01-03 DIAGNOSIS — E03.9 ACQUIRED HYPOTHYROIDISM: ICD-10-CM

## 2025-01-03 DIAGNOSIS — R13.19 ESOPHAGEAL DYSPHAGIA: ICD-10-CM

## 2025-01-03 DIAGNOSIS — R73.9 HYPERGLYCEMIA: ICD-10-CM

## 2025-01-03 DIAGNOSIS — E55.9 VITAMIN D DEFICIENCY: ICD-10-CM

## 2025-01-03 DIAGNOSIS — M85.852 OSTEOPENIA OF LEFT HIP: ICD-10-CM

## 2025-01-03 DIAGNOSIS — K59.09 CHRONIC CONSTIPATION: ICD-10-CM

## 2025-01-03 PROBLEM — N81.84 PELVIC MUSCLE WASTING: Status: RESOLVED | Noted: 2024-01-08 | Resolved: 2025-01-03

## 2025-01-03 PROBLEM — N95.2 ATROPHIC VAGINITIS: Status: RESOLVED | Noted: 2019-12-18 | Resolved: 2025-01-03

## 2025-01-03 PROBLEM — G89.29 CHRONIC PAIN OF LEFT ANKLE: Status: RESOLVED | Noted: 2021-06-07 | Resolved: 2025-01-03

## 2025-01-03 PROBLEM — M25.572 CHRONIC PAIN OF LEFT ANKLE: Status: RESOLVED | Noted: 2021-06-07 | Resolved: 2025-01-03

## 2025-01-03 PROCEDURE — G2211 COMPLEX E/M VISIT ADD ON: HCPCS | Performed by: FAMILY MEDICINE

## 2025-01-03 PROCEDURE — 99214 OFFICE O/P EST MOD 30 MIN: CPT | Performed by: FAMILY MEDICINE

## 2025-01-03 RX ORDER — ACETAMINOPHEN 160 MG
2000 TABLET,DISINTEGRATING ORAL DAILY
Start: 2025-01-03

## 2025-01-03 RX ORDER — MELOXICAM 15 MG/1
15 TABLET ORAL DAILY PRN
Qty: 30 TABLET | Refills: 1 | Status: SHIPPED | OUTPATIENT
Start: 2025-01-03

## 2025-01-03 RX ORDER — LEVOTHYROXINE SODIUM 75 UG/1
75 TABLET ORAL DAILY
Qty: 100 TABLET | Refills: 3 | Status: SHIPPED | OUTPATIENT
Start: 2025-01-03

## 2025-01-03 NOTE — PROGRESS NOTES
Name: Bonita Candelario      : 1955      MRN: [id number]  Encounter Provider: Salinas Lewis Jr, MD  Encounter Date: 1/3/2025   Encounter department: [facility]  :  Assessment & Plan  Hypercholesterolemia  Overall risk is only 6.2% at this time.  She is somewhat concerned regarding family history of vascular disease but has not yet completed her coronary calcium score or vascular screening.  She plans on getting that done.  Hold on lipid lowering therapy at this time as her overall 10-year risk is only 6.2%.  Orders:    CBC and differential; Future    Comprehensive metabolic panel; Future    Lipid Panel with Direct LDL reflex; Future    Hyperglycemia  Monitor A1c and continue routine exercise  Orders:    Hemoglobin A1C; Future    Vitamin D deficiency  Check vitamin D prior to follow-up  Orders:    Cholecalciferol (Vitamin D3) 50 MCG (2000 UT) capsule; Take 1 capsule (2,000 Units total) by mouth daily    Vitamin D 25 hydroxy; Future    Acquired hypothyroidism  Monitor TSH and continue levothyroxine  Orders:    levothyroxine 75 mcg tablet; Take 1 tablet (75 mcg total) by mouth daily    TSH, 3rd generation with Free T4 reflex; Future    Post-menopausal  Check DEXA  Orders:    DXA bone density spine hip and pelvis; Future    Trochanteric bursitis of both hips  She does appear to have trochanteric bursa of her hips and is planning on starting an exercise program.  She may benefit from cortisone injection but she wants hold off at this time.       Tendinitis of right rotator cuff  She still has occasional right shoulder pain and takes meloxicam on a rare occasion.  This was refilled today.  Orders:    meloxicam (MOBIC) 15 mg tablet; Take 1 tablet (15 mg total) by mouth daily as needed for moderate pain    Osteopenia of left hip  Continue with routine exercise and check DEXA       Chronic constipation  She has some mild persistent chronic constipation.  This seems to be stable at this  time.  She is up-to-date on colon cancer screening.       Esophageal dysphagia  Fortunately, dysphagia has been quite stable.              History of Present Illness     HPI patient presents today for follow-up of chronic health issues.  She has concern regarding some stiffness and discomfort of her hip girdles bilaterally.  She did undergo some physical therapy which is helpful for neck pain but she still has some bilateral hip discomfort with certain movements.  She denies any acute injuries.  She has a history of constipation which has been currently stable.  She continues on levothyroxine for her thyroid disease.  As mention, neck pain is improved with therapy as has low back pain.  Review of Systems   Constitutional:  Negative for appetite change, chills, fatigue, fever and unexpected weight change.   HENT:  Negative for trouble swallowing.    Eyes:  Negative for visual disturbance.   Respiratory:  Negative for cough, chest tightness, shortness of breath and wheezing.    Cardiovascular:  Negative for chest pain, palpitations and leg swelling.   Gastrointestinal:  Negative for abdominal distention, abdominal pain, blood in stool, constipation and diarrhea.   Endocrine: Negative for polyuria.   Genitourinary:  Negative for difficulty urinating and flank pain.   Musculoskeletal:  Positive for arthralgias and back pain. Negative for myalgias.   Skin:  Negative for rash.   Neurological:  Negative for dizziness and light-headedness.   Hematological:  Negative for adenopathy. Does not bruise/bleed easily.   Psychiatric/Behavioral:  Negative for dysphoric mood and sleep disturbance. The patient is not nervous/anxious.        Objective   /72   Pulse 76   Temp (!) 97.2 °F (36.2 °C)   Wt 62.6 kg (138 lb)   SpO2 100%   BMI 25.24 kg/m²      Physical Exam  Constitutional:       General: She is not in acute distress.     Appearance: She is well-developed. She is not diaphoretic.   HENT:      Head: Normocephalic.       Right Ear: External ear normal.      Left Ear: External ear normal.      Nose: Nose normal.   Eyes:      General: No scleral icterus.        Right eye: No discharge.         Left eye: No discharge.      Conjunctiva/sclera: Conjunctivae normal.      Pupils: Pupils are equal, round, and reactive to light.   Neck:      Thyroid: No thyromegaly.      Trachea: No tracheal deviation.   Cardiovascular:      Rate and Rhythm: Normal rate and regular rhythm.      Heart sounds: Normal heart sounds. No murmur heard.  Pulmonary:      Effort: Pulmonary effort is normal. No respiratory distress.      Breath sounds: Normal breath sounds. No stridor. No wheezing, rhonchi or rales.   Abdominal:      General: Bowel sounds are normal. There is no distension.      Palpations: Abdomen is soft.      Tenderness: There is no abdominal tenderness. There is no guarding.   Musculoskeletal:         General: Tenderness (over b/l troch bursae) present. No deformity. Normal range of motion.      Right lower leg: No edema.      Left lower leg: No edema.   Lymphadenopathy:      Cervical: No cervical adenopathy.   Skin:     General: Skin is warm.      Coloration: Skin is not pale.      Findings: No erythema or rash.   Neurological:      Cranial Nerves: No cranial nerve deficit.      Coordination: Coordination normal.   Psychiatric:         Mood and Affect: Mood normal.         Behavior: Behavior normal.         Thought Content: Thought content normal.

## 2025-01-03 NOTE — ASSESSMENT & PLAN NOTE
Overall risk is only 6.2% at this time.  She is somewhat concerned regarding family history of vascular disease but has not yet completed her coronary calcium score or vascular screening.  She plans on getting that done.  Hold on lipid lowering therapy at this time as her overall 10-year risk is only 6.2%.  Orders:    CBC and differential; Future    Comprehensive metabolic panel; Future    Lipid Panel with Direct LDL reflex; Future

## 2025-01-03 NOTE — ASSESSMENT & PLAN NOTE
Check vitamin D prior to follow-up  Orders:    Cholecalciferol (Vitamin D3) 50 MCG (2000 UT) capsule; Take 1 capsule (2,000 Units total) by mouth daily    Vitamin D 25 hydroxy; Future

## 2025-01-03 NOTE — ASSESSMENT & PLAN NOTE
She still has occasional right shoulder pain and takes meloxicam on a rare occasion.  This was refilled today.  Orders:    meloxicam (MOBIC) 15 mg tablet; Take 1 tablet (15 mg total) by mouth daily as needed for moderate pain

## 2025-01-03 NOTE — ASSESSMENT & PLAN NOTE
She does appear to have trochanteric bursa of her hips and is planning on starting an exercise program.  She may benefit from cortisone injection but she wants hold off at this time.

## 2025-01-03 NOTE — ASSESSMENT & PLAN NOTE
Monitor TSH and continue levothyroxine  Orders:    levothyroxine 75 mcg tablet; Take 1 tablet (75 mcg total) by mouth daily    TSH, 3rd generation with Free T4 reflex; Future

## 2025-01-04 NOTE — ASSESSMENT & PLAN NOTE
She has some mild persistent chronic constipation.  This seems to be stable at this time.  She is up-to-date on colon cancer screening.

## 2025-02-13 ENCOUNTER — HOSPITAL ENCOUNTER (OUTPATIENT)
Dept: CT IMAGING | Facility: HOSPITAL | Age: 70
Discharge: HOME | End: 2025-02-13
Payer: COMMERCIAL

## 2025-02-13 ENCOUNTER — HOSPITAL ENCOUNTER (OUTPATIENT)
Dept: NON INVASIVE DIAGNOSTICS | Facility: HOSPITAL | Age: 70
Discharge: HOME | End: 2025-02-13
Payer: COMMERCIAL

## 2025-02-13 ENCOUNTER — RESULTS FOLLOW-UP (OUTPATIENT)
Dept: FAMILY MEDICINE CLINIC | Facility: CLINIC | Age: 70
End: 2025-02-13

## 2025-02-13 DIAGNOSIS — E78.00 HYPERCHOLESTEROLEMIA: ICD-10-CM

## 2025-02-13 DIAGNOSIS — E78.00 HYPERCHOLESTEROLEMIA: Primary | ICD-10-CM

## 2025-02-13 DIAGNOSIS — Z00.00 MEDICARE ANNUAL WELLNESS VISIT, SUBSEQUENT: ICD-10-CM

## 2025-02-13 PROCEDURE — 75571 CT HRT W/O DYE W/CA TEST: CPT

## 2025-02-13 PROCEDURE — 93922 UPR/L XTREMITY ART 2 LEVELS: CPT | Performed by: STUDENT IN AN ORGANIZED HEALTH CARE EDUCATION/TRAINING PROGRAM

## 2025-02-13 PROCEDURE — 93978 VASCULAR STUDY: CPT | Performed by: STUDENT IN AN ORGANIZED HEALTH CARE EDUCATION/TRAINING PROGRAM

## 2025-02-13 PROCEDURE — 93880 EXTRACRANIAL BILAT STUDY: CPT | Performed by: STUDENT IN AN ORGANIZED HEALTH CARE EDUCATION/TRAINING PROGRAM

## 2025-02-13 PROCEDURE — 93922 UPR/L XTREMITY ART 2 LEVELS: CPT

## 2025-02-24 RX ORDER — ROSUVASTATIN CALCIUM 5 MG/1
5 TABLET, COATED ORAL DAILY
Qty: 100 TABLET | Refills: 3 | Status: SHIPPED | OUTPATIENT
Start: 2025-02-24

## 2025-05-12 ENCOUNTER — RA CDI HCC (OUTPATIENT)
Dept: OTHER | Facility: HOSPITAL | Age: 70
End: 2025-05-12

## 2025-05-19 ENCOUNTER — APPOINTMENT (OUTPATIENT)
Dept: LAB | Facility: MEDICAL CENTER | Age: 70
End: 2025-05-19
Payer: COMMERCIAL

## 2025-05-19 DIAGNOSIS — E78.00 HYPERCHOLESTEROLEMIA: ICD-10-CM

## 2025-05-19 DIAGNOSIS — E55.9 VITAMIN D DEFICIENCY: ICD-10-CM

## 2025-05-19 DIAGNOSIS — E03.9 ACQUIRED HYPOTHYROIDISM: ICD-10-CM

## 2025-05-19 DIAGNOSIS — R73.9 HYPERGLYCEMIA: ICD-10-CM

## 2025-05-19 LAB
25(OH)D3 SERPL-MCNC: 30.6 NG/ML (ref 30–100)
ALBUMIN SERPL BCG-MCNC: 4.4 G/DL (ref 3.5–5)
ALP SERPL-CCNC: 63 U/L (ref 34–104)
ALT SERPL W P-5'-P-CCNC: 21 U/L (ref 7–52)
ANION GAP SERPL CALCULATED.3IONS-SCNC: 7 MMOL/L (ref 4–13)
AST SERPL W P-5'-P-CCNC: 17 U/L (ref 13–39)
BASOPHILS # BLD AUTO: 0.03 THOUSANDS/ÂΜL (ref 0–0.1)
BASOPHILS NFR BLD AUTO: 1 % (ref 0–1)
BILIRUB SERPL-MCNC: 0.98 MG/DL (ref 0.2–1)
BUN SERPL-MCNC: 13 MG/DL (ref 5–25)
CALCIUM SERPL-MCNC: 9.3 MG/DL (ref 8.4–10.2)
CHLORIDE SERPL-SCNC: 104 MMOL/L (ref 96–108)
CHOLEST SERPL-MCNC: 146 MG/DL (ref ?–200)
CO2 SERPL-SCNC: 29 MMOL/L (ref 21–32)
CREAT SERPL-MCNC: 0.57 MG/DL (ref 0.6–1.3)
EOSINOPHIL # BLD AUTO: 0.1 THOUSAND/ÂΜL (ref 0–0.61)
EOSINOPHIL NFR BLD AUTO: 2 % (ref 0–6)
ERYTHROCYTE [DISTWIDTH] IN BLOOD BY AUTOMATED COUNT: 12.7 % (ref 11.6–15.1)
EST. AVERAGE GLUCOSE BLD GHB EST-MCNC: 117 MG/DL
GFR SERPL CREATININE-BSD FRML MDRD: 96 ML/MIN/1.73SQ M
GLUCOSE P FAST SERPL-MCNC: 104 MG/DL (ref 65–99)
HBA1C MFR BLD: 5.7 %
HCT VFR BLD AUTO: 40 % (ref 34.8–46.1)
HDLC SERPL-MCNC: 58 MG/DL
HGB BLD-MCNC: 13.2 G/DL (ref 11.5–15.4)
IMM GRANULOCYTES # BLD AUTO: 0.01 THOUSAND/UL (ref 0–0.2)
IMM GRANULOCYTES NFR BLD AUTO: 0 % (ref 0–2)
LDLC SERPL CALC-MCNC: 70 MG/DL (ref 0–100)
LYMPHOCYTES # BLD AUTO: 2.05 THOUSANDS/ÂΜL (ref 0.6–4.47)
LYMPHOCYTES NFR BLD AUTO: 46 % (ref 14–44)
MCH RBC QN AUTO: 31.4 PG (ref 26.8–34.3)
MCHC RBC AUTO-ENTMCNC: 33 G/DL (ref 31.4–37.4)
MCV RBC AUTO: 95 FL (ref 82–98)
MONOCYTES # BLD AUTO: 0.42 THOUSAND/ÂΜL (ref 0.17–1.22)
MONOCYTES NFR BLD AUTO: 9 % (ref 4–12)
NEUTROPHILS # BLD AUTO: 1.86 THOUSANDS/ÂΜL (ref 1.85–7.62)
NEUTS SEG NFR BLD AUTO: 42 % (ref 43–75)
NRBC BLD AUTO-RTO: 0 /100 WBCS
PLATELET # BLD AUTO: 257 THOUSANDS/UL (ref 149–390)
PMV BLD AUTO: 10.1 FL (ref 8.9–12.7)
POTASSIUM SERPL-SCNC: 4.3 MMOL/L (ref 3.5–5.3)
PROT SERPL-MCNC: 6.8 G/DL (ref 6.4–8.4)
RBC # BLD AUTO: 4.2 MILLION/UL (ref 3.81–5.12)
SODIUM SERPL-SCNC: 140 MMOL/L (ref 135–147)
TRIGL SERPL-MCNC: 88 MG/DL (ref ?–150)
TSH SERPL DL<=0.05 MIU/L-ACNC: 1.3 UIU/ML (ref 0.45–4.5)
WBC # BLD AUTO: 4.47 THOUSAND/UL (ref 4.31–10.16)

## 2025-05-19 PROCEDURE — 36415 COLL VENOUS BLD VENIPUNCTURE: CPT

## 2025-05-19 PROCEDURE — 80053 COMPREHEN METABOLIC PANEL: CPT

## 2025-05-19 PROCEDURE — 85025 COMPLETE CBC W/AUTO DIFF WBC: CPT

## 2025-05-19 PROCEDURE — 84443 ASSAY THYROID STIM HORMONE: CPT

## 2025-05-19 PROCEDURE — 83036 HEMOGLOBIN GLYCOSYLATED A1C: CPT

## 2025-05-19 PROCEDURE — 80061 LIPID PANEL: CPT

## 2025-05-19 PROCEDURE — 82306 VITAMIN D 25 HYDROXY: CPT

## 2025-05-21 ENCOUNTER — APPOINTMENT (OUTPATIENT)
Dept: RADIOLOGY | Facility: MEDICAL CENTER | Age: 70
End: 2025-05-21
Payer: COMMERCIAL

## 2025-05-21 ENCOUNTER — OFFICE VISIT (OUTPATIENT)
Dept: FAMILY MEDICINE CLINIC | Facility: CLINIC | Age: 70
End: 2025-05-21
Payer: COMMERCIAL

## 2025-05-21 VITALS
OXYGEN SATURATION: 99 % | SYSTOLIC BLOOD PRESSURE: 110 MMHG | WEIGHT: 135 LBS | RESPIRATION RATE: 12 BRPM | BODY MASS INDEX: 24.69 KG/M2 | HEART RATE: 63 BPM | DIASTOLIC BLOOD PRESSURE: 60 MMHG

## 2025-05-21 DIAGNOSIS — M70.61 TROCHANTERIC BURSITIS OF BOTH HIPS: ICD-10-CM

## 2025-05-21 DIAGNOSIS — R93.1 ELEVATED CORONARY ARTERY CALCIUM SCORE: ICD-10-CM

## 2025-05-21 DIAGNOSIS — M70.62 TROCHANTERIC BURSITIS OF BOTH HIPS: ICD-10-CM

## 2025-05-21 DIAGNOSIS — R73.9 HYPERGLYCEMIA: ICD-10-CM

## 2025-05-21 DIAGNOSIS — M76.32 IT BAND SYNDROME, LEFT: ICD-10-CM

## 2025-05-21 DIAGNOSIS — E03.9 ACQUIRED HYPOTHYROIDISM: ICD-10-CM

## 2025-05-21 DIAGNOSIS — M79.10 MYALGIA: ICD-10-CM

## 2025-05-21 DIAGNOSIS — M79.662 BILATERAL CALF PAIN: ICD-10-CM

## 2025-05-21 DIAGNOSIS — Z12.31 ENCOUNTER FOR SCREENING MAMMOGRAM FOR BREAST CANCER: ICD-10-CM

## 2025-05-21 DIAGNOSIS — M79.661 BILATERAL CALF PAIN: ICD-10-CM

## 2025-05-21 DIAGNOSIS — Z00.00 MEDICARE ANNUAL WELLNESS VISIT, SUBSEQUENT: Primary | ICD-10-CM

## 2025-05-21 DIAGNOSIS — E55.9 VITAMIN D DEFICIENCY: ICD-10-CM

## 2025-05-21 DIAGNOSIS — E78.00 HYPERCHOLESTEROLEMIA: ICD-10-CM

## 2025-05-21 PROBLEM — G89.29 CHRONIC NECK PAIN: Status: RESOLVED | Noted: 2018-05-29 | Resolved: 2025-05-21

## 2025-05-21 PROBLEM — R13.19 ESOPHAGEAL DYSPHAGIA: Status: RESOLVED | Noted: 2020-09-30 | Resolved: 2025-05-21

## 2025-05-21 PROBLEM — M54.2 CHRONIC NECK PAIN: Status: RESOLVED | Noted: 2018-05-29 | Resolved: 2025-05-21

## 2025-05-21 PROCEDURE — 99214 OFFICE O/P EST MOD 30 MIN: CPT | Performed by: FAMILY MEDICINE

## 2025-05-21 PROCEDURE — G2211 COMPLEX E/M VISIT ADD ON: HCPCS | Performed by: FAMILY MEDICINE

## 2025-05-21 PROCEDURE — G0439 PPPS, SUBSEQ VISIT: HCPCS | Performed by: FAMILY MEDICINE

## 2025-05-21 PROCEDURE — G0444 DEPRESSION SCREEN ANNUAL: HCPCS | Performed by: FAMILY MEDICINE

## 2025-05-21 PROCEDURE — 73521 X-RAY EXAM HIPS BI 2 VIEWS: CPT

## 2025-05-21 RX ORDER — ATORVASTATIN CALCIUM 10 MG/1
10 TABLET, FILM COATED ORAL DAILY
COMMUNITY
End: 2025-05-21

## 2025-05-21 NOTE — ASSESSMENT & PLAN NOTE
She appears to be having some proximal left IT band issues and I am going to have her see physical therapy.  Check hip x-rays in light of her chronic hip stiffness  Orders:    Ambulatory referral to Physical Therapy; Future

## 2025-05-21 NOTE — ASSESSMENT & PLAN NOTE
She is having some bilateral calf pain.  She has some muscle tightness which may be related to her gait changes from her chronic left IT band syndrome.  I am going to send her to physical therapy.  Orders:    Ambulatory referral to Physical Therapy; Future

## 2025-05-21 NOTE — PROGRESS NOTES
Name: Bonita Candelario      : 1955      MRN: [id number]  Encounter Provider: Salinas Lewis Jr, MD  Encounter Date: 2025   Encounter department: [facility]IMARY [facility]  :  Assessment & Plan  Medicare annual wellness visit, subsequent  Up-to-date with immunizations       Encounter for screening mammogram for breast cancer  Check mammo  Orders:    Mammo screening left w 3d and cad; Future    It band syndrome, left  She appears to be having some proximal left IT band issues and I am going to have her see physical therapy.  Check hip x-rays in light of her chronic hip stiffness  Orders:    Ambulatory referral to Physical Therapy; Future    Hyperglycemia  Monitor A1c.  Watch carbs and continue routine exercise.  A1c did bump a bit since starting Crestor.  Monitor vitamin D  Orders:    Hemoglobin A1C; Future    Magnesium; Future    Hypercholesterolemia  Check lipids  Orders:    Lipid Panel with Direct LDL reflex; Future    Acquired hypothyroidism  Continue levothyroxine and routine monitoring of her TSH.  Orders:    TSH, 3rd generation with Free T4 reflex; Future    Vitamin D deficiency  Monitor vitamin D levels  Orders:    Vitamin D 25 hydroxy; Future    Bilateral calf pain  She is having some bilateral calf pain.  She has some muscle tightness which may be related to her gait changes from her chronic left IT band syndrome.  I am going to send her to physical therapy.  Orders:    Ambulatory referral to Physical Therapy; Future    Trochanteric bursitis of both hips   check hip films       Myalgia  She is having some muscle aches in her calves.  Check labs as outlined.  Refer to PT.  Orders:    Comprehensive metabolic panel; Future    CBC and differential; Future    Lipid Panel with Direct LDL reflex; Future    Vitamin D 25 hydroxy; Future    C-reactive protein; Future    CK; Future    Elevated coronary artery calcium score  Respecters well-controlled with rosuvastatin.  Cholesterol has  improved significantly.            Preventive health issues were discussed with patient, and age appropriate screening tests were ordered as noted in patient's After Visit Summary. Personalized health advice and appropriate referrals for health education or preventive services given if needed, as noted in patient's After Visit Summary.    History of Present Illness     HPI patient presents for Medicare wellness as well as a follow-up of chronic health issues.  She is noting some lateral left hip pain.  She also notes a stiffness in her hips when she ambulates.  She is having a little bit of pain in bilateral calves.  She is not sure if this started after starting Crestor, but her cholesterol has improved significantly.  She has an elevated  coronary calcium level and is tolerating statin therapy.    Patient Care Team:  Salinas Andersen Jr., MD as PCP - General    Review of Systems   Constitutional:  Negative for appetite change, chills, fatigue, fever and unexpected weight change.   HENT:  Negative for trouble swallowing.    Eyes:  Negative for visual disturbance.   Respiratory:  Negative for cough, chest tightness, shortness of breath and wheezing.    Cardiovascular:  Negative for chest pain, palpitations and leg swelling.   Gastrointestinal:  Negative for abdominal distention, abdominal pain, blood in stool, constipation and diarrhea.   Endocrine: Negative for polyuria.   Genitourinary:  Negative for difficulty urinating and flank pain.   Musculoskeletal:  Positive for arthralgias and myalgias.   Skin:  Negative for rash.   Neurological:  Negative for dizziness and light-headedness.   Hematological:  Negative for adenopathy. Does not bruise/bleed easily.   Psychiatric/Behavioral:  Negative for dysphoric mood and sleep disturbance. The patient is not nervous/anxious.      Medical History Reviewed by provider this encounter:       Annual Wellness Visit Questionnaire   Bonita is here for her Subsequent Wellness visit.      Health Risk Assessment:   Patient rates overall health as good. Patient feels that their physical health rating is same. Patient is satisfied with their life. Eyesight was rated as same. Hearing was rated as same. Patient feels that their emotional and mental health rating is same. Patients states they are never, rarely angry. Patient states they are sometimes unusually tired/fatigued. Pain experienced in the last 7 days has been some. Patient's pain rating has been 2/10. Patient states that she has experienced no weight loss or gain in last 6 months.     Depression Screening:   PHQ-2 Score: 0  PHQ-9 Score: 0      Fall Risk Screening:   In the past year, patient has experienced: no history of falling in past year      Urinary Incontinence Screening:   Patient has not leaked urine accidently in the last six months.     Home Safety:  Patient does not have trouble with stairs inside or outside of their home. Patient has working smoke alarms and has working carbon monoxide detector. Home safety hazards include: none.     Nutrition:   Current diet is Regular.     Medications:   Patient is currently taking over-the-counter supplements. OTC medications include: see medication list. Patient is able to manage medications.     Activities of Daily Living (ADLs)/Instrumental Activities of Daily Living (IADLs):   Walk and transfer into and out of bed and chair?: Yes  Dress and groom yourself?: Yes    Bathe or shower yourself?: Yes    Feed yourself? Yes  Do your laundry/housekeeping?: Yes  Manage your money, pay your bills and track your expenses?: Yes  Make your own meals?: Yes    Do your own shopping?: Yes    Previous Hospitalizations:   Any hospitalizations or ED visits within the last 12 months?: No      Advance Care Planning:   Living will: No    Durable POA for healthcare: No    End of Life Decisions reviewed with patient: Yes      Cognitive Screening:   Provider or family/friend/caregiver concerned regarding  cognition?: No    Preventive Screenings      Cardiovascular Screening:    General: Screening Not Indicated and History Lipid Disorder      Diabetes Screening:     General: Screening Current      Colorectal Cancer Screening:     General: Screening Current      Breast Cancer Screening:     General: Screening Current      Cervical Cancer Screening:    General: Screening Not Indicated      Osteoporosis Screening:    General: Risks and Benefits Discussed    Due for: DXA Axial      Abdominal Aortic Aneurysm (AAA) Screening:        General: Screening Not Indicated      Lung Cancer Screening:     General: Screening Not Indicated      Hepatitis C Screening:    General: Screening Current    Cardiovascular Risk Assessment:  Patient does not have underlying ASCVD and their cardiovascular risk was assessed today. Their cardiovascular risk factors include: hyperlipidemia.     The 10-year ASCVD risk score (Gabriela CONTRERAS, et al., 2019) is: 4.4%    Values used to calculate the score:      Age: 69 years      Clincally relevant sex: Female      Is Non- : No      Diabetic: No      Tobacco smoker: No      Systolic Blood Pressure: 110 mmHg      Is BP treated: No      HDL Cholesterol: 58 mg/dL      Total Cholesterol: 146 mg/dL    Screening, Brief Intervention, and Referral to Treatment (SBIRT)     Screening  Typical number of drinks in a day: 1  Typical number of drinks in a week: 5  Interpretation: Low risk drinking behavior.    Single Item Drug Screening:  How often have you used an illegal drug (including marijuana) or a prescription medication for non-medical reasons in the past year? never    Single Item Drug Screen Score: 0  Interpretation: Negative screen for possible drug use disorder    Annual Depression Screening  Time spent screening and evaluating the patient for depression during today's encounter was 5 minutes.    Other Counseling Topics:   Regular weightbearing exercise and calcium and vitamin D intake.      Social Drivers of Health     Financial Resource Strain: Low Risk  (5/10/2023)    Overall Financial Resource Strain (CARDIA)     Difficulty of Paying Living Expenses: Not hard at all   Food Insecurity: No Food Insecurity (5/15/2024)    Nursing - Inadequate Food Risk Classification     Worried About Running Out of Food in the Last Year: Never true     Ran Out of Food in the Last Year: Never true   Transportation Needs: No Transportation Needs (5/15/2024)    PRAPARE - Transportation     Lack of Transportation (Medical): No     Lack of Transportation (Non-Medical): No   Housing Stability: Low Risk  (5/15/2024)    Housing Stability Vital Sign     Unable to Pay for Housing in the Last Year: No     Number of Times Moved in the Last Year: 1     Homeless in the Last Year: No   Utilities: Not At Risk (5/15/2024)    [facility] Utilities     Threatened with loss of utilities: No     No results found.    Objective   There were no vitals taken for this visit.    Physical Exam

## 2025-05-21 NOTE — ASSESSMENT & PLAN NOTE
Monitor A1c.  Watch carbs and continue routine exercise.  A1c did bump a bit since starting Crestor.  Monitor vitamin D  Orders:    Hemoglobin A1C; Future    Magnesium; Future

## 2025-05-21 NOTE — ASSESSMENT & PLAN NOTE
Continue levothyroxine and routine monitoring of her TSH.  Orders:    TSH, 3rd generation with Free T4 reflex; Future

## 2025-05-27 ENCOUNTER — RESULTS FOLLOW-UP (OUTPATIENT)
Dept: FAMILY MEDICINE CLINIC | Facility: CLINIC | Age: 70
End: 2025-05-27

## 2025-07-29 ENCOUNTER — APPOINTMENT (EMERGENCY)
Dept: RADIOLOGY | Facility: HOSPITAL | Age: 70
End: 2025-07-29
Payer: COMMERCIAL

## 2025-07-29 ENCOUNTER — HOSPITAL ENCOUNTER (EMERGENCY)
Facility: HOSPITAL | Age: 70
Discharge: HOME | End: 2025-07-29
Attending: EMERGENCY MEDICINE | Admitting: EMERGENCY MEDICINE
Payer: COMMERCIAL

## 2025-07-29 VITALS
BODY MASS INDEX: 25.32 KG/M2 | RESPIRATION RATE: 20 BRPM | TEMPERATURE: 98.2 F | HEART RATE: 78 BPM | DIASTOLIC BLOOD PRESSURE: 71 MMHG | OXYGEN SATURATION: 99 % | SYSTOLIC BLOOD PRESSURE: 132 MMHG | WEIGHT: 138.45 LBS

## 2025-07-29 DIAGNOSIS — S42.292A CLOSED FRACTURE OF HEAD OF LEFT HUMERUS, INITIAL ENCOUNTER: Primary | ICD-10-CM

## 2025-07-29 PROCEDURE — 99283 EMERGENCY DEPT VISIT LOW MDM: CPT

## 2025-07-29 PROCEDURE — 99284 EMERGENCY DEPT VISIT MOD MDM: CPT | Performed by: PHYSICIAN ASSISTANT

## 2025-07-29 PROCEDURE — 73030 X-RAY EXAM OF SHOULDER: CPT

## 2025-07-29 RX ORDER — OXYCODONE HYDROCHLORIDE 5 MG/1
5 TABLET ORAL EVERY 6 HOURS PRN
Qty: 8 TABLET | Refills: 0 | Status: SHIPPED | OUTPATIENT
Start: 2025-07-29

## 2025-07-29 RX ORDER — OXYCODONE HYDROCHLORIDE 5 MG/1
5 TABLET ORAL ONCE
Status: COMPLETED | OUTPATIENT
Start: 2025-07-29 | End: 2025-07-29

## 2025-07-29 RX ADMIN — OXYCODONE HYDROCHLORIDE 5 MG: 5 TABLET ORAL at 20:17

## 2025-08-04 ENCOUNTER — OFFICE VISIT (OUTPATIENT)
Dept: OBGYN CLINIC | Facility: MEDICAL CENTER | Age: 70
End: 2025-08-04
Payer: COMMERCIAL

## 2025-08-04 VITALS — HEIGHT: 62 IN | WEIGHT: 140 LBS | BODY MASS INDEX: 25.76 KG/M2

## 2025-08-04 DIAGNOSIS — S42.292A CLOSED FRACTURE OF HEAD OF LEFT HUMERUS, INITIAL ENCOUNTER: Primary | ICD-10-CM

## 2025-08-04 PROCEDURE — 99204 OFFICE O/P NEW MOD 45 MIN: CPT | Performed by: ORTHOPAEDIC SURGERY

## 2025-08-04 RX ORDER — IBUPROFEN 200 MG
200 TABLET ORAL ONCE
COMMUNITY

## 2025-08-11 ENCOUNTER — EVALUATION (OUTPATIENT)
Dept: PHYSICAL THERAPY | Facility: MEDICAL CENTER | Age: 70
End: 2025-08-11
Attending: ORTHOPAEDIC SURGERY
Payer: COMMERCIAL

## 2025-08-13 ENCOUNTER — OFFICE VISIT (OUTPATIENT)
Dept: PHYSICAL THERAPY | Facility: MEDICAL CENTER | Age: 70
End: 2025-08-13
Attending: ORTHOPAEDIC SURGERY
Payer: COMMERCIAL

## 2025-08-18 ENCOUNTER — OFFICE VISIT (OUTPATIENT)
Dept: PHYSICAL THERAPY | Facility: MEDICAL CENTER | Age: 70
End: 2025-08-18
Attending: ORTHOPAEDIC SURGERY
Payer: COMMERCIAL

## 2025-08-18 DIAGNOSIS — S42.292A CLOSED FRACTURE OF HEAD OF LEFT HUMERUS, INITIAL ENCOUNTER: Primary | ICD-10-CM

## 2025-08-18 PROCEDURE — 97140 MANUAL THERAPY 1/> REGIONS: CPT | Performed by: PHYSICAL THERAPIST

## 2025-08-18 PROCEDURE — 97110 THERAPEUTIC EXERCISES: CPT | Performed by: PHYSICAL THERAPIST

## 2025-08-20 ENCOUNTER — OFFICE VISIT (OUTPATIENT)
Dept: PHYSICAL THERAPY | Facility: MEDICAL CENTER | Age: 70
End: 2025-08-20
Attending: ORTHOPAEDIC SURGERY
Payer: COMMERCIAL

## 2025-08-20 DIAGNOSIS — S42.292A CLOSED FRACTURE OF HEAD OF LEFT HUMERUS, INITIAL ENCOUNTER: Primary | ICD-10-CM

## 2025-08-20 PROCEDURE — 97110 THERAPEUTIC EXERCISES: CPT | Performed by: PHYSICAL THERAPIST

## 2025-08-20 PROCEDURE — 97140 MANUAL THERAPY 1/> REGIONS: CPT | Performed by: PHYSICAL THERAPIST

## (undated) DEVICE — SUT PDS II 2-0 CT-2 27 IN Z333H

## (undated) DEVICE — CURITY PLAIN PACKING STRIP: Brand: CURITY

## (undated) DEVICE — GLOVE SRG LF STRL BGL SKNSNS 8 PF

## (undated) DEVICE — DECANTER: Brand: UNBRANDED

## (undated) DEVICE — EXIDINE 4 PCT

## (undated) DEVICE — SPONGE CHERRY 1/2IN

## (undated) DEVICE — CHLORHEXIDINE 4PCT 4 OZ

## (undated) DEVICE — SUT VICRYL 2-0 SH 27 IN UNDYED J417H

## (undated) DEVICE — ELECTROSURGICAL DEVICE HOLSTER;FOR USE WITH MAXIMUM PEAK VOLTAGE OF 4000 V: Brand: FORCE TRIVERSE

## (undated) DEVICE — SMOKE EVACUATION TUBING WITH 8 IN INTEGRAL WAND AND SPONGE GUARD: Brand: BUFFALO FILTER

## (undated) DEVICE — CYSTO TUBING SINGLE IRRIGATION

## (undated) DEVICE — INTENDED FOR TISSUE SEPARATION, AND OTHER PROCEDURES THAT REQUIRE A SHARP SURGICAL BLADE TO PUNCTURE OR CUT.: Brand: BARD-PARKER SAFETY BLADES SIZE 11, STERILE

## (undated) DEVICE — INTENT TO BE USED WITH SUTURE MATERIAL FOR TISSUE CLOSURE: Brand: RICHARD-ALLAN® NEEDLE 1/2 CIRCLE TAPER

## (undated) DEVICE — PACKING VAGINAL 2 IN

## (undated) DEVICE — SUT VICRYL 0 CT-1 36 IN J946H

## (undated) DEVICE — STRL ALLENTOWN HYSTEROSCOPY PK: Brand: CARDINAL HEALTH

## (undated) DEVICE — 2000CC GUARDIAN II: Brand: GUARDIAN

## (undated) DEVICE — NEEDLE HYPO 22G X 1-1/2 IN

## (undated) DEVICE — TUBING SUCTION 5MM X 12 FT

## (undated) DEVICE — NEEDLE SPINAL 22G X 7IN QUINCKE

## (undated) DEVICE — STERILE SURGICAL LUBRICANT,  TUBE: Brand: SURGILUBE

## (undated) DEVICE — SPONGE 4 X 4 XRAY 16 PLY STRL LF RFD

## (undated) DEVICE — CATH FOLEY 18FR 5ML 2 WAY UNCOATED SILICONE

## (undated) DEVICE — MEDI-VAC YANKAUER SUCTION HANDLE W/BULBOUS AND CONTROL VENT: Brand: CARDINAL HEALTH

## (undated) DEVICE — SCD SEQUENTIAL COMPRESSION COMFORT SLEEVE MEDIUM KNEE LENGTH: Brand: KENDALL SCD

## (undated) DEVICE — BASIC SINGLE BASIN 2-LF: Brand: MEDLINE INDUSTRIES, INC.

## (undated) DEVICE — ALLENTOWN DR  LUCENTE S LAP PK: Brand: CARDINAL HEALTH

## (undated) DEVICE — PREMIUM DRY TRAY LF: Brand: MEDLINE INDUSTRIES, INC.

## (undated) DEVICE — BULB SYRINGE,IRRIGATION WITH PROTECTIVE CAP: Brand: DOVER

## (undated) DEVICE — UNDER BUTTOCKS DRAPE W/FLUID CONTROL POUCH: Brand: CONVERTORS

## (undated) DEVICE — SYRINGE 3ML LL

## (undated) DEVICE — GLOVE PI ULTRA TOUCH SZ.8.0

## (undated) DEVICE — CAUTERY TIP POLISHER: Brand: DEVON